# Patient Record
Sex: FEMALE | Race: WHITE | NOT HISPANIC OR LATINO | Employment: FULL TIME | URBAN - METROPOLITAN AREA
[De-identification: names, ages, dates, MRNs, and addresses within clinical notes are randomized per-mention and may not be internally consistent; named-entity substitution may affect disease eponyms.]

---

## 2017-01-06 DIAGNOSIS — E11.9 TYPE 2 DIABETES MELLITUS WITHOUT COMPLICATION, UNSPECIFIED LONG TERM INSULIN USE STATUS: Primary | ICD-10-CM

## 2017-01-13 RX ORDER — BLOOD SUGAR DIAGNOSTIC
STRIP MISCELLANEOUS
Qty: 100 STRIP | Refills: 3 | Status: SHIPPED | OUTPATIENT
Start: 2017-01-13 | End: 2018-01-10

## 2017-02-10 ENCOUNTER — TELEPHONE (OUTPATIENT)
Dept: OBGYN | Facility: CLINIC | Age: 52
End: 2017-02-10

## 2017-02-10 DIAGNOSIS — Z12.39 BREAST CANCER SCREENING, HIGH RISK PATIENT: Primary | ICD-10-CM

## 2017-02-10 NOTE — TELEPHONE ENCOUNTER
Received VM from pt requesting orders for diagnostic mammogram due to breast pain. She was going to have mammo today but needed additional orders. Attempted to reach her to discuss which breast is causing her pain so correct order can be placed- but received VM. Left message to call back to discuss.

## 2017-02-10 NOTE — TELEPHONE ENCOUNTER
Orders placed for a bilateral diagnostic mammogram with ultrasound on right side. Pt is having right breast pain and states she was told by breast center she would need additional orders.

## 2017-02-20 ENCOUNTER — RADIANT APPOINTMENT (OUTPATIENT)
Dept: MAMMOGRAPHY | Facility: CLINIC | Age: 52
End: 2017-02-20
Attending: INTERNAL MEDICINE

## 2017-02-20 DIAGNOSIS — Z12.39 BREAST CANCER SCREENING, HIGH RISK PATIENT: ICD-10-CM

## 2017-02-20 DIAGNOSIS — N64.4 BREAST PAIN, RIGHT: ICD-10-CM

## 2017-03-30 DIAGNOSIS — C49.22: Primary | ICD-10-CM

## 2017-05-24 ENCOUNTER — OFFICE VISIT (OUTPATIENT)
Dept: ORTHOPEDICS | Facility: CLINIC | Age: 52
End: 2017-05-24

## 2017-05-24 VITALS — HEIGHT: 67 IN | BODY MASS INDEX: 41.59 KG/M2 | WEIGHT: 265 LBS

## 2017-05-24 DIAGNOSIS — C49.22: Primary | ICD-10-CM

## 2017-05-24 ASSESSMENT — ENCOUNTER SYMPTOMS
MUSCLE WEAKNESS: 0
NECK PAIN: 0
MYALGIAS: 0
BACK PAIN: 1
STIFFNESS: 0
MUSCLE CRAMPS: 1
ARTHRALGIAS: 0
JOINT SWELLING: 0

## 2017-05-24 NOTE — PROGRESS NOTES
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 There are no recent changes in her medical family or social history. She's not noticed any pain swelling fevers night sweats and nausea vomiting.     On examination she is alert oriented has a normal affect and is in no acute distress. Her heart has a regular rate and rhythm respirations are regular and the ligaments are nonicteric and reactive. In her left leg there is no edema or erythema or adenopathy. I can feel and see her scar but there is no distinct mass in this area. She is numb distal to the mass and that has been unchanged since the time of surgery. She has full motion of her hip knee and ankle. Her gait is normal and symmetric.     CT scan shows no sign of metastatic disease and the MRI is unchanged from 1 performed In December 2015.     Impression is no evidence of tumor. She will follow-up as needed if she finds a mass.

## 2017-05-24 NOTE — NURSING NOTE
"Reason For Visit:   Chief Complaint   Patient presents with     RECHECK     Follow up wide resection of left ankle tumor, DOS: 04/11/2011. Same Day MRI and CT Scan.        Pain Assessment  Patient Currently in Pain: No               HEIGHT: 5' 7\", WEIGHT: 265 lbs 0 oz, BMI: Body mass index is 41.5 kg/(m^2).      Current Outpatient Prescriptions   Medication Sig Dispense Refill     ACCU-CHEK SMARTVIEW test strip TEST FOUR TIMES DAILY AS DIRECTED 100 strip 3     estradiol (VAGIFEM) 10 MCG TABS Place 1 tablet (10 mcg) vaginally twice a week 24 tablet 4     losartan (COZAAR) 50 MG tablet TAKE 1 TABLET BY MOUTH DAILY 90 tablet 3     metFORMIN (GLUCOPHAGE-XR) 500 MG 24 hr tablet Take 2 tablets (1,000 mg) by mouth 2 times daily (with meals) 180 tablet 11     simvastatin (ZOCOR) 20 MG tablet Take 1 tablet (20 mg) by mouth daily 90 tablet 3     blood glucose (DIANELYS CONTOUR NEXT) test strip 1 strip by In Vitro route 2 times daily As directed. 150 strip 5     blood glucose monitoring (ACCU-CHEK COMPACT CARE KIT) meter device kit Test 4 times daily.  May dispense whatever type of brand is covered by patients insurance. 1 kit 0     VITAMIN D, CHOLECALCIFEROL, PO Take 2,000 Units by mouth daily       Blood Glucose Monitoring Suppl (DIANELYS CONTOUR NEXT MONITOR) W/DEVICE KIT As directed       Cetirizine HCl (ZYRTEC PO) Take 1 tablet by mouth as needed.       aspirin 81 MG tablet Take 1 tablet by mouth daily.       Calcium-Vitamin D (CALCIUM + D PO) Take 1 tablet by mouth daily.            Allergies   Allergen Reactions     Latex Itching, Swelling and Rash     "

## 2017-05-24 NOTE — LETTER
5/24/2017       RE: Sola Silverman  [address] [place] [address]     Dear Colleague,    Thank you for referring your patient, Sola Silverman, to the [facility] at [facility]. Please see a copy of my visit note below.    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 There are no recent changes in her medical family or social history. She's not noticed any pain swelling fevers night sweats and nausea vomiting.     On examination she is alert oriented has a normal affect and is in no acute distress. Her heart has a regular rate and rhythm respirations are regular and the ligaments are nonicteric and reactive. In her left leg there is no edema or erythema or adenopathy. I can feel and see her scar but there is no distinct mass in this area. She is numb distal to the mass and that has been unchanged since the time of surgery. She has full motion of her hip knee and ankle. Her gait is normal and symmetric.     CT scan shows no sign of metastatic disease and the MRI is unchanged from 1 performed In December 2015.     Impression is no evidence of tumor. She will follow-up as needed if she finds a mass.    Again, thank you for allowing me to participate in the care of your patient.      Sincerely,    Bhupendra Garcias MD

## 2017-05-24 NOTE — MR AVS SNAPSHOT
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         Additional Information About Your Visit        MyChart Information     [employer] gives you secure access to your electronic health record. If you see a primary care provider, you can also send messages to your care team and make appointments. If you have questions, please call your primary care clinic.  If you do not have a primary care provider, please call [phone] and they will assist you.      [employer] is an electronic gateway that provides easy, online access to your medical records. With [employer], you can request a clinic appointment, read your test results, renew a prescription or communicate with your care team.     To access your existing account, please contact your [facility] or call [phone] for assistance.        Care EveryWhere ID     This is your Care EveryWhere ID. This could be used by other organizations to access your [place] medical records  [id number]        Your Vitals Were     Height BMI (Body Mass Index)                1.702 m (5' 7\") 41.5 kg/m2           Blood Pressure from Last 3 " Encounters:   08/10/16 109/75   07/13/16 131/88   05/10/16 151/90    Weight from Last 3 Encounters:   05/24/17 120.2 kg (265 lb)   08/10/16 122.9 kg (271 lb)   07/13/16 124.7 kg (275 lb)              Today, you had the following     No orders found for display       Primary Care Provider Office Phone # Fax #    Alda Paz Santamaria [phone]       [facility] [address] [facility] [address]        Thank you!     Thank you for choosing [facility]  for your care. Our goal is always to provide you with excellent care. Hearing back from our patients is one way we can continue to improve our services. Please take a few minutes to complete the written survey that you may receive in the mail after your visit with us. Thank you!             Your Updated Medication List - Protect others around you: Learn how to safely use, store and throw away your medicines at www.disposemymeds.org.          This list is accurate as of: 5/24/17  2:06 PM.  Always use your most recent med list.                   Brand Name Dispense Instructions for use    aspirin 81 MG tablet      Take 1 tablet by mouth daily.       * blood glucose monitoring meter device kit      As directed       * blood glucose monitoring meter device kit     1 kit    Test 4 times daily.  May dispense whatever type of brand is covered by patients insurance.       * blood glucose monitoring test strip    DIANELYS CONTOUR NEXT    150 strip    1 strip by In Vitro route 2 times daily As directed.       * ACCU-CHEK SMARTVIEW test strip   Generic drug:  blood glucose monitoring     100 strip    TEST FOUR TIMES DAILY AS DIRECTED       CALCIUM + D PO      Take 1 tablet by mouth daily.       estradiol 10 MCG Tabs vaginal tablet    VAGIFEM    24 tablet    Place 1 tablet (10 mcg) vaginally twice a week       losartan 50 MG tablet    COZAAR    90 tablet    TAKE 1 TABLET BY MOUTH DAILY       metFORMIN 500 MG 24 hr tablet     GLUCOPHAGE-XR    180 tablet    Take 2 tablets (1,000 mg) by mouth 2 times daily (with meals)       simvastatin 20 MG tablet    ZOCOR    90 tablet    Take 1 tablet (20 mg) by mouth daily       VITAMIN D (CHOLECALCIFEROL) PO      Take 2,000 Units by mouth daily       ZYRTEC PO      Take 1 tablet by mouth as needed.       * Notice:  This list has 4 medication(s) that are the same as other medications prescribed for you. Read the directions carefully, and ask your doctor or other care provider to review them with you.

## 2017-07-27 DIAGNOSIS — E11.9 TYPE 2 DIABETES MELLITUS WITHOUT COMPLICATION, UNSPECIFIED LONG TERM INSULIN USE STATUS: ICD-10-CM

## 2017-07-27 DIAGNOSIS — E78.01 HYPERLIPIDEMIA TYPE II: ICD-10-CM

## 2017-07-27 DIAGNOSIS — N95.2 ATROPHIC VAGINITIS: ICD-10-CM

## 2017-07-27 RX ORDER — LOSARTAN POTASSIUM 50 MG/1
50 TABLET ORAL DAILY
Qty: 90 TABLET | Refills: 0 | Status: SHIPPED | OUTPATIENT
Start: 2017-07-27 | End: 2017-09-13

## 2017-07-27 RX ORDER — METFORMIN HCL 500 MG
1000 TABLET, EXTENDED RELEASE 24 HR ORAL 2 TIMES DAILY WITH MEALS
Qty: 360 TABLET | Refills: 0 | Status: SHIPPED | OUTPATIENT
Start: 2017-07-27 | End: 2017-09-13

## 2017-07-27 RX ORDER — ESTRADIOL 10 UG/1
10 INSERT VAGINAL
Qty: 24 TABLET | Refills: 0 | Status: SHIPPED | OUTPATIENT
Start: 2017-07-27 | End: 2017-10-04

## 2017-07-27 RX ORDER — SIMVASTATIN 20 MG
20 TABLET ORAL DAILY
Qty: 90 TABLET | Refills: 0 | Status: SHIPPED | OUTPATIENT
Start: 2017-07-27 | End: 2017-09-13

## 2017-07-27 NOTE — TELEPHONE ENCOUNTER
Received refill request for prescriptions from Dr Beth.  Last in clinic 8/2016.    Spoke to Sola who is working on a saundra with Sept deadline so will schedule her annual with Dr Santamaria for later Sept.  I will send refills to cover until can get seen.Pt indicated understanding and agreed with plan.

## 2017-09-01 DIAGNOSIS — E11.9 TYPE 2 DIABETES MELLITUS WITHOUT COMPLICATION (H): ICD-10-CM

## 2017-09-06 RX ORDER — METFORMIN HCL 500 MG
TABLET, EXTENDED RELEASE 24 HR ORAL
Qty: 360 TABLET | Refills: 0 | OUTPATIENT
Start: 2017-09-06

## 2017-09-13 ENCOUNTER — TELEPHONE (OUTPATIENT)
Dept: OBGYN | Facility: CLINIC | Age: 52
End: 2017-09-13

## 2017-09-13 DIAGNOSIS — E78.01 HYPERLIPIDEMIA TYPE II: ICD-10-CM

## 2017-09-13 DIAGNOSIS — E11.9 TYPE 2 DIABETES MELLITUS WITHOUT COMPLICATION, UNSPECIFIED LONG TERM INSULIN USE STATUS: ICD-10-CM

## 2017-09-13 RX ORDER — SIMVASTATIN 20 MG
20 TABLET ORAL DAILY
Qty: 30 TABLET | Refills: 0 | Status: SHIPPED | OUTPATIENT
Start: 2017-09-13 | End: 2017-10-04

## 2017-09-13 RX ORDER — LOSARTAN POTASSIUM 50 MG/1
50 TABLET ORAL DAILY
Qty: 30 TABLET | Refills: 0 | Status: SHIPPED | OUTPATIENT
Start: 2017-09-13 | End: 2017-10-04

## 2017-09-13 RX ORDER — METFORMIN HCL 500 MG
1000 TABLET, EXTENDED RELEASE 24 HR ORAL 2 TIMES DAILY WITH MEALS
Qty: 120 TABLET | Refills: 0 | Status: SHIPPED | OUTPATIENT
Start: 2017-09-13 | End: 2017-10-04

## 2017-09-13 NOTE — TELEPHONE ENCOUNTER
----- Message from Nikko Montanez sent at 9/13/2017 11:03 AM CDT -----  Regarding: PT of Dr. Santamaria needs another month of RX refills  Contact: [phone]  PT of Dr. Santamaria called to reschedule her appointment from today but needs another month on a few RX refills.  PT would like refills on losartan, metFORMIN and simvastatin and is contacting her pharmacy to send over requests.  She stated she is almost out of metformin.  PT can be reached at [phone].    Thank you,  Nikko    Call Center

## 2017-09-13 NOTE — TELEPHONE ENCOUNTER
Patient needing temporary refills since Dr. Santamaria cancelled clinic today. Rx sent. Patient notified.

## 2017-10-04 ENCOUNTER — OFFICE VISIT (OUTPATIENT)
Dept: INTERNAL MEDICINE | Facility: CLINIC | Age: 52
End: 2017-10-04
Attending: INTERNAL MEDICINE
Payer: COMMERCIAL

## 2017-10-04 VITALS
HEIGHT: 67 IN | HEART RATE: 77 BPM | SYSTOLIC BLOOD PRESSURE: 138 MMHG | BODY MASS INDEX: 41.44 KG/M2 | WEIGHT: 264 LBS | DIASTOLIC BLOOD PRESSURE: 92 MMHG

## 2017-10-04 DIAGNOSIS — E78.01 HYPERLIPIDEMIA TYPE II: ICD-10-CM

## 2017-10-04 DIAGNOSIS — Z23 NEED FOR IMMUNIZATION AGAINST INFLUENZA: ICD-10-CM

## 2017-10-04 DIAGNOSIS — Z00.00 ROUTINE GENERAL MEDICAL EXAMINATION AT A HEALTH CARE FACILITY: Primary | ICD-10-CM

## 2017-10-04 DIAGNOSIS — E11.9 TYPE 2 DIABETES MELLITUS WITHOUT COMPLICATION, UNSPECIFIED LONG TERM INSULIN USE STATUS: ICD-10-CM

## 2017-10-04 DIAGNOSIS — N95.2 ATROPHIC VAGINITIS: ICD-10-CM

## 2017-10-04 PROCEDURE — 25000128 H RX IP 250 OP 636: Mod: ZF

## 2017-10-04 PROCEDURE — G0008 ADMIN INFLUENZA VIRUS VAC: HCPCS

## 2017-10-04 PROCEDURE — 90471 IMMUNIZATION ADMIN: CPT | Mod: ZF

## 2017-10-04 PROCEDURE — 99213 OFFICE O/P EST LOW 20 MIN: CPT | Mod: ZF

## 2017-10-04 PROCEDURE — 90686 IIV4 VACC NO PRSV 0.5 ML IM: CPT | Mod: ZF

## 2017-10-04 RX ORDER — METFORMIN HCL 500 MG
1000 TABLET, EXTENDED RELEASE 24 HR ORAL 2 TIMES DAILY WITH MEALS
Qty: 120 TABLET | Refills: 0 | Status: SHIPPED | OUTPATIENT
Start: 2017-10-04 | End: 2017-11-10

## 2017-10-04 RX ORDER — ESTRADIOL 10 UG/1
10 INSERT VAGINAL
Qty: 24 TABLET | Refills: 0 | Status: SHIPPED | OUTPATIENT
Start: 2017-10-05 | End: 2018-01-10

## 2017-10-04 RX ORDER — SIMVASTATIN 20 MG
20 TABLET ORAL DAILY
Qty: 30 TABLET | Refills: 0 | Status: SHIPPED | OUTPATIENT
Start: 2017-10-04 | End: 2017-12-14

## 2017-10-04 RX ORDER — LOSARTAN POTASSIUM 100 MG/1
100 TABLET ORAL DAILY
Qty: 90 TABLET | Refills: 3 | Status: SHIPPED | OUTPATIENT
Start: 2017-10-04 | End: 2018-03-21

## 2017-10-04 ASSESSMENT — ANXIETY QUESTIONNAIRES
GAD7 TOTAL SCORE: 7
2. NOT BEING ABLE TO STOP OR CONTROL WORRYING: MORE THAN HALF THE DAYS
6. BECOMING EASILY ANNOYED OR IRRITABLE: MORE THAN HALF THE DAYS
7. FEELING AFRAID AS IF SOMETHING AWFUL MIGHT HAPPEN: NOT AT ALL
1. FEELING NERVOUS, ANXIOUS, OR ON EDGE: SEVERAL DAYS
3. WORRYING TOO MUCH ABOUT DIFFERENT THINGS: SEVERAL DAYS
5. BEING SO RESTLESS THAT IT IS HARD TO SIT STILL: NOT AT ALL

## 2017-10-04 ASSESSMENT — PATIENT HEALTH QUESTIONNAIRE - PHQ9
5. POOR APPETITE OR OVEREATING: SEVERAL DAYS
SUM OF ALL RESPONSES TO PHQ QUESTIONS 1-9: 5

## 2017-10-04 NOTE — PROGRESS NOTES
SUBJECTIVE:   CC: Sola Silverman is an 51 year old woman who presents for preventive health visit.     Healthy Habits:    Do you get at least three servings of calcium containing foods daily (dairy, green leafy vegetables, etc.)? yes    Amount of exercise or daily activities, outside of work: not regular    Problems taking medications regularly No    Medication side effects: No    Have you had an eye exam in the past two years? yes    Do you see a dentist twice per year? yes    Do you have sleep apnea, excessive snoring or daytime drowsiness?no          -------------------------------------    Today's PHQ-2 Score: PHQ-2 ( 1999 Pfizer) 7/13/2016   Q1: Little interest or pleasure in doing things Several days   Q2: Feeling down, depressed or hopeless Several days   PHQ-2 Score 2         Abuse: Current or Past(Physical, Sexual or Emotional)- No  Do you feel safe in your environment - Yes  Social History   Substance Use Topics     Smoking status: Never Smoker     Smokeless tobacco: Never Used     Alcohol use Yes      Comment: rare     The patient does not drink >3 drinks per day nor >7 drinks per week.    Reviewed orders with patient.  Reviewed health maintenance and updated orders accordingly - Yes  Labs reviewed in [facility]    Patient over age 50, mutual decision to screen reflected in health maintenance.      Pertinent mammograms are reviewed under the imaging tab.  History of abnormal Pap smear: NO - age 30-65 PAP every 5 years with negative HPV co-testing recommended    Reviewed and updated as needed this visit by [facility]  Meds         Reviewed and updated as needed this visit by Provider        Past Medical History:   Diagnosis Date     Allergy      Ankle joint pain      BRCA2 positive      Depression      Diabetes (H)      Hyperlipidemia      Hypertension      Lumbago      Morbid obesity (H)       Past Surgical History:   Procedure Laterality Date     ABDOMEN SURGERY       EXCISE MASS LOWER  "EXTREMITY  4/11/2011    Procedure:EXCISE MASS LOWER EXTREMITY; Wound Mass; Surgeon:DREW LAURA; Location:[facility]     FOOT SURGERY  2000    left     KNEE SURGERY  1986    left     SALPINGO-OOPHORECTOMY BILATERAL         ROS:  C: NEGATIVE for fever, chills, change in weight  I: NEGATIVE for worrisome rashes, moles or lesions  E: NEGATIVE for vision changes or irritation  ENT: NEGATIVE for ear, mouth and throat problems  R: NEGATIVE for significant cough or SOB  B: NEGATIVE for masses, tenderness or discharge  CV: NEGATIVE for chest pain, palpitations or peripheral edema  GI: NEGATIVE for nausea, abdominal pain, heartburn, or change in bowel habits  : NEGATIVE for unusual urinary or vaginal symptoms. No vaginal bleeding.  M: NEGATIVE for significant arthralgias or myalgia  N: NEGATIVE for weakness, dizziness or paresthesias  P: NEGATIVE for changes in mood or affect     OBJECTIVE:   BP (!) 138/92  Pulse 77  Ht 1.695 m (5' 6.75\")  Wt 119.7 kg (264 lb)  Breastfeeding? No  BMI 41.66 kg/m2  EXAM:  GENERAL: healthy, alert and no distress  EYES: Eyes grossly normal to inspection, PERRL and conjunctivae and sclerae normal  HENT: ear canals and TM's normal, nose and mouth without ulcers or lesions  NECK: no adenopathy, no asymmetry, masses, or scars and thyroid normal to palpation  RESP: lungs clear to auscultation - no rales, rhonchi or wheezes  CV: regular rate and rhythm, normal S1 S2, no S3 or S4, no murmur, click or rub, no peripheral edema and peripheral pulses strong  ABDOMEN: soft, nontender, no hepatosplenomegaly, no masses and bowel sounds normal  MS: no gross musculoskeletal defects noted, no edema  SKIN: no suspicious lesions or rashes  NEURO: Normal strength and tone, mentation intact and speech normal  PSYCH: mentation appears normal, affect normal/bright    ASSESSMENT/PLAN:   1. Routine general medical examination at a health care facility  Patient was advised on vaccinations. Recommend eye " "exam. Discussed thyroid screening. Patient was also advised to check chemistry panel and vitamin D level.   - OPHTHALMOLOGY ADULT REFERRAL  - TSH with free T4 reflex; Future  - 25 OH Vit D therapy monitoring; Future  - Basic Metabolic Panel; Future    2. Hyperlipidemia type II  Patient was advised on lipid management. Recommend to continue with simvastatin.   - losartan (COZAAR) 100 MG tablet; Take 1 tablet (100 mg) by mouth daily  Dispense: 90 tablet; Refill: 3  - simvastatin (ZOCOR) 20 MG tablet; Take 1 tablet (20 mg) by mouth daily  Dispense: 30 tablet; Refill: 0    3. Type 2 diabetes mellitus without complication, unspecified long term insulin use status (H)  Reviewed diabetes management. Will check Hgb A1C. Will screen for microalbuminuria. Patient was also advised on the need for regualr follow up. Refill for metformin was sent to patient's pharmacy.   - metFORMIN (GLUCOPHAGE-XR) 500 MG 24 hr tablet; Take 2 tablets (1,000 mg) by mouth 2 times daily (with meals)  Dispense: 120 tablet; Refill: 0  - Hepatic Panel; Future  - Albumin Random Urine Quantitative with Creat Ratio; Future  - Hemoglobin A1c; Future    4. Atrophic vaginitis  Refill fro topical estradiol was sent to patient's pharmacy.   - estradiol (VAGIFEM) 10 MCG TABS vaginal tablet; Place 1 tablet (10 mcg) vaginally twice a week  Dispense: 24 tablet; Refill: 0    5. Need for immunization against influenza  - HC FLU VAC PRESRV FREE QUAD SPLIT VIR 3+YRS IM    COUNSELING:   Reviewed preventive health counseling, as reflected in patient instructions       Regular exercise       Healthy diet/nutrition       Vision screening         reports that she has never smoked. She has never used smokeless tobacco.    Estimated body mass index is 41.66 kg/(m^2) as calculated from the following:    Height as of this encounter: 1.695 m (5' 6.75\").    Weight as of this encounter: 119.7 kg (264 lb).         Counseling Resources:  ATP IV Guidelines  Pooled Cohorts Equation " Calculator  Breast Cancer Risk Calculator  FRAX Risk Assessment  ICSI Preventive Guidelines  Dietary Guidelines for Americans, 2010  [employer]'s MyPlate  ASA Prophylaxis  Lung CA Screening    Alda Santamaria MD  [facility]

## 2017-10-04 NOTE — LETTER
10/4/2017       RE: Sola Silverman  [address] [place] [address]     Dear Colleague,    Thank you for referring your patient, Sola Silverman, to the [facility]  at [facility]. Please see a copy of my visit note below.       SUBJECTIVE:   CC: Sola Silverman is an 51 year old woman who presents for preventive health visit.     Healthy Habits:    Do you get at least three servings of calcium containing foods daily (dairy, green leafy vegetables, etc.)? yes    Amount of exercise or daily activities, outside of work: not regular    Problems taking medications regularly No    Medication side effects: No    Have you had an eye exam in the past two years? yes    Do you see a dentist twice per year? yes    Do you have sleep apnea, excessive snoring or daytime drowsiness?no          -------------------------------------    Today's PHQ-2 Score: PHQ-2 ( 1999 Pfizer) 7/13/2016   Q1: Little interest or pleasure in doing things Several days   Q2: Feeling down, depressed or hopeless Several days   PHQ-2 Score 2         Abuse: Current or Past(Physical, Sexual or Emotional)- No  Do you feel safe in your environment - Yes  Social History   Substance Use Topics     Smoking status: Never Smoker     Smokeless tobacco: Never Used     Alcohol use Yes      Comment: rare     The patient does not drink >3 drinks per day nor >7 drinks per week.    Reviewed orders with patient.  Reviewed health maintenance and updated orders accordingly - Yes  Labs reviewed in [facility]    Patient over age 50, mutual decision to screen reflected in health maintenance.      Pertinent mammograms are reviewed under the imaging tab.  History of abnormal Pap smear: NO - age 30-65 PAP every 5 years with negative HPV co-testing recommended    Reviewed and updated as needed this visit by [facility]         Reviewed and updated as needed this visit by Provider        Past  "Medical History:   Diagnosis Date     Allergy      Ankle joint pain      BRCA2 positive      Depression      Diabetes (H)      Hyperlipidemia      Hypertension      Lumbago      Morbid obesity (H)       Past Surgical History:   Procedure Laterality Date     ABDOMEN SURGERY       EXCISE MASS LOWER EXTREMITY  4/11/2011    Procedure:EXCISE MASS LOWER EXTREMITY; Wound Mass; Surgeon:DREW LAURA; Location[facility]     FOOT SURGERY  2000    left     KNEE SURGERY  1986    left     SALPINGO-OOPHORECTOMY BILATERAL         ROS:  C: NEGATIVE for fever, chills, change in weight  I: NEGATIVE for worrisome rashes, moles or lesions  E: NEGATIVE for vision changes or irritation  ENT: NEGATIVE for ear, mouth and throat problems  R: NEGATIVE for significant cough or SOB  B: NEGATIVE for masses, tenderness or discharge  CV: NEGATIVE for chest pain, palpitations or peripheral edema  GI: NEGATIVE for nausea, abdominal pain, heartburn, or change in bowel habits  : NEGATIVE for unusual urinary or vaginal symptoms. No vaginal bleeding.  M: NEGATIVE for significant arthralgias or myalgia  N: NEGATIVE for weakness, dizziness or paresthesias  P: NEGATIVE for changes in mood or affect     OBJECTIVE:   BP (!) 138/92  Pulse 77  Ht 1.695 m (5' 6.75\")  Wt 119.7 kg (264 lb)  Breastfeeding? No  BMI 41.66 kg/m2  EXAM:  GENERAL: healthy, alert and no distress  EYES: Eyes grossly normal to inspection, PERRL and conjunctivae and sclerae normal  HENT: ear canals and TM's normal, nose and mouth without ulcers or lesions  NECK: no adenopathy, no asymmetry, masses, or scars and thyroid normal to palpation  RESP: lungs clear to auscultation - no rales, rhonchi or wheezes  CV: regular rate and rhythm, normal S1 S2, no S3 or S4, no murmur, click or rub, no peripheral edema and peripheral pulses strong  ABDOMEN: soft, nontender, no hepatosplenomegaly, no masses and bowel sounds normal  MS: no gross musculoskeletal defects noted, no edema  SKIN: " no suspicious lesions or rashes  NEURO: Normal strength and tone, mentation intact and speech normal  PSYCH: mentation appears normal, affect normal/bright    ASSESSMENT/PLAN:   1. Routine general medical examination at a health care facility  Patient was advised on vaccinations. Recommend eye exam. Discussed thyroid screening. Patient was also advised to check chemistry panel and vitamin D level.   - OPHTHALMOLOGY ADULT REFERRAL  - TSH with free T4 reflex; Future  - 25 OH Vit D therapy monitoring; Future  - Basic Metabolic Panel; Future    2. Hyperlipidemia type II  Patient was advised on lipid management. Recommend to continue with simvastatin.   - losartan (COZAAR) 100 MG tablet; Take 1 tablet (100 mg) by mouth daily  Dispense: 90 tablet; Refill: 3  - simvastatin (ZOCOR) 20 MG tablet; Take 1 tablet (20 mg) by mouth daily  Dispense: 30 tablet; Refill: 0    3. Type 2 diabetes mellitus without complication, unspecified long term insulin use status (H)  Reviewed diabetes management. Will check Hgb A1C. Will screen for microalbuminuria. Patient was also advised on the need for regualr follow up. Refill for metformin was sent to patient's pharmacy.   - metFORMIN (GLUCOPHAGE-XR) 500 MG 24 hr tablet; Take 2 tablets (1,000 mg) by mouth 2 times daily (with meals)  Dispense: 120 tablet; Refill: 0  - Hepatic Panel; Future  - Albumin Random Urine Quantitative with Creat Ratio; Future  - Hemoglobin A1c; Future    4. Atrophic vaginitis  Refill fro topical estradiol was sent to patient's pharmacy.   - estradiol (VAGIFEM) 10 MCG TABS vaginal tablet; Place 1 tablet (10 mcg) vaginally twice a week  Dispense: 24 tablet; Refill: 0    5. Need for immunization against influenza  - HC FLU VAC PRESRV FREE QUAD SPLIT VIR 3+YRS IM    COUNSELING:   Reviewed preventive health counseling, as reflected in patient instructions       Regular exercise       Healthy diet/nutrition       Vision screening         reports that she has never smoked.  "She has never used smokeless tobacco.    Estimated body mass index is 41.66 kg/(m^2) as calculated from the following:    Height as of this encounter: 1.695 m (5' 6.75\").    Weight as of this encounter: 119.7 kg (264 lb).         Counseling Resources:  ATP IV Guidelines  Pooled Cohorts Equation Calculator  Breast Cancer Risk Calculator  FRAX Risk Assessment  ICSI Preventive Guidelines  Dietary Guidelines for Americans, 2010  USDA's MyPlate  ASA Prophylaxis  Lung CA Screening    Alda Santamaria MD  [facility]           "

## 2017-10-04 NOTE — NURSING NOTE
"Injectable Influenza Immunization Documentation    1.  Has the patient received the information for the injectable influenza vaccine? YES     2. Is the patient 6 months of age or older? YES     3. Does the patient have any of the following contraindications?         Severe allergy to eggs? No     Severe allergic reaction to previous influenza vaccines? No   Severe allergy to latex? No       History of Guillain-Mingus syndrome? No     Currently have a temperature greater than 100.4F? No        4.  Severely egg allergic patients should have flu vaccine eligibility assessed by an MD, RN, or pharmacist, and those who received flu vaccine should be observed for 15 min by an MD, RN, Pharmacist, Medical Technician, or member of clinic staff.\": YES    5. Latex-allergic patients should be given latex-free influenza vaccine Yes. Please reference the Vaccine latex table to determine if your clinic s product is latex-containing.       Vaccination given by Gina Parker LPN        "

## 2017-10-04 NOTE — MR AVS SNAPSHOT
After Visit Summary   10/4/2017    Sola Silverman    MRN: [id number]           Patient Information     Date Of Birth          1965        Visit Information        Provider Department      10/4/2017 1:00 PM Alda Santamaria MD [facility]         Today's Diagnoses     Routine general medical examination at a health care facility    -  1    Hyperlipidemia type II        Type 2 diabetes mellitus without complication, unspecified long term insulin use status (H)        Atrophic vaginitis        Need for immunization against influenza          Care Instructions      Preventive Health Recommendations  Female Ages 50 - 64    Yearly exam: See your health care provider every year in order to  o Review health changes.   o Discuss preventive care.    o Review your medicines if your doctor has prescribed any.      Get a Pap test every three years (unless you have an abnormal result and your provider advises testing more often).    If you get Pap tests with HPV test, you only need to test every 5 years, unless you have an abnormal result.     You do not need a Pap test if your uterus was removed (hysterectomy) and you have not had cancer.    You should be tested each year for STDs (sexually transmitted diseases) if you're at risk.     Have a mammogram every 1 to 2 years.    Have a colonoscopy at age 50, or have a yearly FIT test (stool test). These exams screen for colon cancer.      Have a cholesterol test every 5 years, or more often if advised.    Have a diabetes test (fasting glucose) every three years. If you are at risk for diabetes, you should have this test more often.     If you are at risk for osteoporosis (brittle bone disease), think about having a bone density scan (DEXA).    Shots: Get a flu shot each year. Get a tetanus shot every 10 years.    Nutrition:     Eat at least 5 servings of fruits and vegetables each day.    Eat whole-grain bread, whole-wheat pasta and  brown rice instead of white grains and rice.    Talk to your provider about Calcium and Vitamin D.     Lifestyle    Exercise at least 150 minutes a week (30 minutes a day, 5 days a week). This will help you control your weight and prevent disease.    Limit alcohol to one drink per day.    No smoking.     Wear sunscreen to prevent skin cancer.     See your dentist every six months for an exam and cleaning.    See your eye doctor every 1 to 2 years.            Follow-ups after your visit        Additional Services     OPHTHALMOLOGY ADULT REFERRAL       Your provider has referred you to: [facility]: Eye Clinic - [place] [phone]   [email or web address]    Please be aware that coverage of these services is subject to the terms and limitations of your health insurance plan.  Call member services at your health plan with any benefit or coverage questions.      Please bring the following with you to your appointment:    (1) Any X-Rays, CTs or MRIs which have been performed.  Contact the facility where they were done to arrange for  prior to your scheduled appointment.    (2) List of current medications  (3) This referral request   (4) Any documents/labs given to you for this referral                  Follow-up notes from your care team     Return in about 2 weeks (around 10/18/2017).      Your next 10 appointments already scheduled     Oct 18, 2017 11:00 AM CDT   Return Visit with Alda Santamaria MD   [facility]  ([facility])    [address],[facility] [address]  [address]  [address] [address] [phone]              Who to contact     Please call your clinic at [phone] to:    Ask questions about your health    Make or cancel appointments    Discuss your medicines    Learn about your test results    Speak to your doctor   If you have compliments or concerns about an experience at your clinic, or if you wish to  "file a complaint, please contact [facility] Patient Relations at [phone] or email us at [email or web address]         Additional Information About Your Visit        [employer] Information     [employer] gives you secure access to your electronic health record. If you see a primary care provider, you can also send messages to your care team and make appointments. If you have questions, please call your primary care clinic.  If you do not have a primary care provider, please call [phone] and they will assist you.      [employer] is an electronic gateway that provides easy, online access to your medical records. With [employer], you can request a clinic appointment, read your test results, renew a prescription or communicate with your care team.     To access your existing account, please contact your [facility] or call [phone] for assistance.        Care EveryWhere ID     This is your Care EveryWhere ID. This could be used by other organizations to access your [facility] medical records  [id number]        Your Vitals Were     Pulse Height Breastfeeding? BMI (Body Mass Index)          77 1.695 m (5' 6.75\") No 41.66 kg/m2         Blood Pressure from Last 3 Encounters:   10/04/17 (!) 138/92   08/10/16 109/75   07/13/16 131/88    Weight from Last 3 Encounters:   10/04/17 119.7 kg (264 lb)   05/24/17 120.2 kg (265 lb)   08/10/16 122.9 kg (271 lb)              We Performed the Following     HC FLU VAC PRESRV FREE QUAD SPLIT VIR 3+YRS IM     OPHTHALMOLOGY ADULT REFERRAL          Today's Medication Changes          These changes are accurate as of: 10/4/17 11:59 PM.  If you have any questions, ask your nurse or doctor.               These medicines have changed or have updated prescriptions.        Dose/Directions    losartan 100 MG tablet   Commonly known as:  COZAAR   This may have changed:    - medication strength  - how much to take   Used for:  " Hyperlipidemia type II   Changed by:  Alda Santamaria MD        Dose:  100 mg   Take 1 tablet (100 mg) by mouth daily   Quantity:  90 tablet   Refills:  3            Where to get your medicines      These medications were sent to [id number] - [place] - [address] [place] AT [address] [place], [place] [address]     Phone:  [phone]     estradiol 10 MCG Tabs vaginal tablet    losartan 100 MG tablet    metFORMIN 500 MG 24 hr tablet    simvastatin 20 MG tablet                Primary Care Provider Office Phone # Fax #    Alda Santamaria [phone]       [facility] [address]  [employer] [address]        Equal Access to Services     [facility]: Twan de la pazo Sodonnieali, waaxda luqadaha, qaybta kaalmada adeegyada, jessica nino . So [facility] [phone].    ATENCIÓN: Si habla español, tiene a schuster disposición servicios gratuitos de asistencia lingüística. [facility] [phone].    We comply with applicable federal civil rights laws and [place] laws. We do not discriminate on the basis of race, color, national origin, age, disability, sex, sexual orientation, or gender identity.            Thank you!     Thank you for choosing [facility]   for your care. Our goal is always to provide you with excellent care. Hearing back from our patients is one way we can continue to improve our services. Please take a few minutes to complete the written survey that you may receive in the mail after your visit with us. Thank you!             Your Updated Medication List - Protect others around you: Learn how to safely use, store and throw away your medicines at www.disposemymeds.org.          This list is accurate as of: 10/4/17 11:59 PM.  Always use your most recent med list.                   Brand Name Dispense Instructions for use Diagnosis    aspirin 81 MG  tablet      Take 1 tablet by mouth daily.        * blood glucose monitoring meter device kit      As directed    Fall, initial encounter       * blood glucose monitoring meter device kit     1 kit    Test 4 times daily.  May dispense whatever type of brand is covered by patients insurance.    Haemoglobin A1c above reference range       * blood glucose monitoring test strip    DIANELYS CONTOUR NEXT    150 strip    1 strip by In Vitro route 2 times daily As directed.    Fall, initial encounter       * ACCU-CHEK SMARTVIEW test strip   Generic drug:  blood glucose monitoring     100 strip    TEST FOUR TIMES DAILY AS DIRECTED    Type 2 diabetes mellitus without complication, unspecified long term insulin use status (H)       CALCIUM + D PO      Take 1 tablet by mouth daily.        estradiol 10 MCG Tabs vaginal tablet    VAGIFEM    24 tablet    Place 1 tablet (10 mcg) vaginally twice a week    Atrophic vaginitis       losartan 100 MG tablet    COZAAR    90 tablet    Take 1 tablet (100 mg) by mouth daily    Hyperlipidemia type II       metFORMIN 500 MG 24 hr tablet    GLUCOPHAGE-XR    120 tablet    Take 2 tablets (1,000 mg) by mouth 2 times daily (with meals)    Type 2 diabetes mellitus without complication, unspecified long term insulin use status (H)       simvastatin 20 MG tablet    ZOCOR    30 tablet    Take 1 tablet (20 mg) by mouth daily    Hyperlipidemia type II       VITAMIN D (CHOLECALCIFEROL) PO      Take 2,000 Units by mouth daily        ZYRTEC PO      Take 1 tablet by mouth as needed.        * Notice:  This list has 4 medication(s) that are the same as other medications prescribed for you. Read the directions carefully, and ask your doctor or other care provider to review them with you.

## 2017-10-05 ASSESSMENT — ANXIETY QUESTIONNAIRES: GAD7 TOTAL SCORE: 7

## 2017-10-16 DIAGNOSIS — E11.9 TYPE 2 DIABETES MELLITUS WITHOUT COMPLICATION, UNSPECIFIED LONG TERM INSULIN USE STATUS: ICD-10-CM

## 2017-10-16 DIAGNOSIS — Z00.00 ROUTINE GENERAL MEDICAL EXAMINATION AT A HEALTH CARE FACILITY: ICD-10-CM

## 2017-10-16 LAB
ALBUMIN SERPL-MCNC: 3.7 G/DL (ref 3.4–5)
ALP SERPL-CCNC: 72 U/L (ref 40–150)
ALT SERPL W P-5'-P-CCNC: 54 U/L (ref 0–50)
ANION GAP SERPL CALCULATED.3IONS-SCNC: 6 MMOL/L (ref 3–14)
AST SERPL W P-5'-P-CCNC: 30 U/L (ref 0–45)
BILIRUB DIRECT SERPL-MCNC: <0.1 MG/DL (ref 0–0.2)
BILIRUB SERPL-MCNC: 0.4 MG/DL (ref 0.2–1.3)
BUN SERPL-MCNC: 9 MG/DL (ref 7–30)
CALCIUM SERPL-MCNC: 9.1 MG/DL (ref 8.5–10.1)
CHLORIDE SERPL-SCNC: 102 MMOL/L (ref 94–109)
CO2 SERPL-SCNC: 28 MMOL/L (ref 20–32)
CREAT SERPL-MCNC: 0.61 MG/DL (ref 0.52–1.04)
GFR SERPL CREATININE-BSD FRML MDRD: >90 ML/MIN/1.7M2
GLUCOSE SERPL-MCNC: 130 MG/DL (ref 70–99)
HBA1C MFR BLD: 6.3 % (ref 4.3–6)
POTASSIUM SERPL-SCNC: 4.1 MMOL/L (ref 3.4–5.3)
PROT SERPL-MCNC: 7.4 G/DL (ref 6.8–8.8)
SODIUM SERPL-SCNC: 136 MMOL/L (ref 133–144)
TSH SERPL DL<=0.005 MIU/L-ACNC: 4 MU/L (ref 0.4–4)

## 2017-10-18 ENCOUNTER — OFFICE VISIT (OUTPATIENT)
Dept: INTERNAL MEDICINE | Facility: CLINIC | Age: 52
End: 2017-10-18
Attending: INTERNAL MEDICINE
Payer: COMMERCIAL

## 2017-10-18 VITALS
HEART RATE: 83 BPM | DIASTOLIC BLOOD PRESSURE: 88 MMHG | WEIGHT: 262 LBS | SYSTOLIC BLOOD PRESSURE: 131 MMHG | BODY MASS INDEX: 41.34 KG/M2

## 2017-10-18 DIAGNOSIS — E11.9 TYPE 2 DIABETES MELLITUS WITHOUT COMPLICATION, WITHOUT LONG-TERM CURRENT USE OF INSULIN (H): Primary | ICD-10-CM

## 2017-10-18 DIAGNOSIS — E11.9 TYPE 2 DIABETES MELLITUS WITHOUT COMPLICATION, UNSPECIFIED LONG TERM INSULIN USE STATUS: ICD-10-CM

## 2017-10-18 LAB
ALBUMIN UR-MCNC: NEGATIVE MG/DL
APPEARANCE UR: CLEAR
BACTERIA #/AREA URNS HPF: ABNORMAL /HPF
BILIRUB UR QL STRIP: NEGATIVE
COLOR UR AUTO: ABNORMAL
CREAT UR-MCNC: 79 MG/DL
DEPRECATED CALCIDIOL+CALCIFEROL SERPL-MC: <47 UG/L (ref 20–75)
GLUCOSE UR STRIP-MCNC: NEGATIVE MG/DL
HGB UR QL STRIP: NEGATIVE
KETONES UR STRIP-MCNC: NEGATIVE MG/DL
LEUKOCYTE ESTERASE UR QL STRIP: NEGATIVE
MICROALBUMIN UR-MCNC: 16 MG/L
MICROALBUMIN/CREAT UR: 20.38 MG/G CR (ref 0–25)
MUCOUS THREADS #/AREA URNS LPF: PRESENT /LPF
NITRATE UR QL: NEGATIVE
PH UR STRIP: 5.5 PH (ref 5–7)
RBC #/AREA URNS AUTO: 0 /HPF (ref 0–2)
SOURCE: ABNORMAL
SP GR UR STRIP: 1.01 (ref 1–1.03)
SQUAMOUS #/AREA URNS AUTO: <1 /HPF (ref 0–1)
UROBILINOGEN UR STRIP-MCNC: NORMAL MG/DL (ref 0–2)
VITAMIN D2 SERPL-MCNC: <5 UG/L
VITAMIN D3 SERPL-MCNC: 42 UG/L
WBC #/AREA URNS AUTO: 1 /HPF (ref 0–2)

## 2017-10-18 PROCEDURE — 82043 UR ALBUMIN QUANTITATIVE: CPT | Performed by: INTERNAL MEDICINE

## 2017-10-18 PROCEDURE — 99212 OFFICE O/P EST SF 10 MIN: CPT | Mod: ZF

## 2017-10-18 PROCEDURE — 81001 URINALYSIS AUTO W/SCOPE: CPT | Performed by: INTERNAL MEDICINE

## 2017-10-18 NOTE — MR AVS SNAPSHOT
After Visit Summary   10/18/2017    Sola Silverman    MRN: [id number]           Patient Information     Date Of Birth          1965        Visit Information        Provider Department      10/18/2017 11:00 AM Alda Santamaria MD [facility]         Today's Diagnoses     Type 2 diabetes mellitus without complication, without long-term current use of insulin (H)    -  1       Follow-ups after your visit        Who to contact     Please call your clinic at [phone] to:    Ask questions about your health    Make or cancel appointments    Discuss your medicines    Learn about your test results    Speak to your doctor   If you have compliments or concerns about an experience at your clinic, or if you wish to file a complaint, please contact [facility] Patient Relations at [phone] or email us at [email or web address]         Additional Information About Your Visit        MyChart Information     [employer] gives you secure access to your electronic health record. If you see a primary care provider, you can also send messages to your care team and make appointments. If you have questions, please call your primary care clinic.  If you do not have a primary care provider, please call [phone] and they will assist you.      [employer] is an electronic gateway that provides easy, online access to your medical records. With [employer], you can request a clinic appointment, read your test results, renew a prescription or communicate with your care team.     To access your existing account, please contact your [facility] or call [phone] for assistance.        Care EveryWhere ID     This is your Care EveryWhere ID. This could be used by other organizations to access your [facility] medical records  [id number]        Your Vitals Were     Pulse BMI (Body Mass Index)                83 41.34 kg/m2            Blood Pressure from Last 3 Encounters:   10/18/17 131/88   10/04/17 (!) 138/92   08/10/16 109/75    Weight from Last 3 Encounters:   10/18/17 118.8 kg (262 lb)   10/04/17 119.7 kg (264 lb)   05/24/17 120.2 kg (265 lb)              We Performed the Following     Albumin Random Urine Quantitative with Creat Ratio     Routine UA with micro reflex to culture        Primary Care Provider Office Phone # Fax Meri Lizama Paz Santamaria [phone]       [facility] [address]  [facility] [address]        Equal Access to Services     [facility]: Hadii elie Qureshi, waflory verduzco, willam kaalmaanibal little, jessica nino . So [facility] [phone].    ATENCIÓN: Si habla español, tiene a schuster disposición servicios gratuitos de asistencia lingüística. [facility] [phone].    We comply with applicable federal civil rights laws and [place] laws. We do not discriminate on the basis of race, color, national origin, age, disability, sex, sexual orientation, or gender identity.            Thank you!     Thank you for choosing [facility]   for your care. Our goal is always to provide you with excellent care. Hearing back from our patients is one way we can continue to improve our services. Please take a few minutes to complete the written survey that you may receive in the mail after your visit with us. Thank you!             Your Updated Medication List - Protect others around you: Learn how to safely use, store and throw away your medicines at www.disposemymeds.org.          This list is accurate as of: 10/18/17 11:59 PM.  Always use your most recent med list.                   Brand Name Dispense Instructions for use Diagnosis    aspirin 81 MG tablet      Take 1 tablet by mouth daily.        * blood glucose monitoring meter device kit      As directed    Fall, initial encounter       * blood glucose monitoring meter device kit     1  kit    Test 4 times daily.  May dispense whatever type of brand is covered by patients insurance.    Haemoglobin A1c above reference range       * blood glucose monitoring test strip    DIANELYS CONTOUR NEXT    150 strip    1 strip by In Vitro route 2 times daily As directed.    Fall, initial encounter       * ACCU-CHEK SMARTVIEW test strip   Generic drug:  blood glucose monitoring     100 strip    TEST FOUR TIMES DAILY AS DIRECTED    Type 2 diabetes mellitus without complication, unspecified long term insulin use status (H)       CALCIUM + D PO      Take 1 tablet by mouth daily.        estradiol 10 MCG Tabs vaginal tablet    VAGIFEM    24 tablet    Place 1 tablet (10 mcg) vaginally twice a week    Atrophic vaginitis       losartan 100 MG tablet    COZAAR    90 tablet    Take 1 tablet (100 mg) by mouth daily    Hyperlipidemia type II       metFORMIN 500 MG 24 hr tablet    GLUCOPHAGE-XR    120 tablet    Take 2 tablets (1,000 mg) by mouth 2 times daily (with meals)    Type 2 diabetes mellitus without complication, unspecified long term insulin use status (H)       simvastatin 20 MG tablet    ZOCOR    30 tablet    Take 1 tablet (20 mg) by mouth daily    Hyperlipidemia type II       VITAMIN D (CHOLECALCIFEROL) PO      Take 2,000 Units by mouth daily        ZYRTEC PO      Take 1 tablet by mouth as needed.        * Notice:  This list has 4 medication(s) that are the same as other medications prescribed for you. Read the directions carefully, and ask your doctor or other care provider to review them with you.

## 2017-10-18 NOTE — LETTER
10/23/2017         Sola Herrera   [address] [place] [address]        Dear Ms. Silverman:    Your labs were reviewed by Alda Santamaria MD and are within acceptable ranges.  No changes are recommended.     Results for orders placed or performed in visit on 10/18/17   Routine UA with micro reflex to culture   Result Value Ref Range    Color Urine Light Yellow     Appearance Urine Clear     Glucose Urine Negative NEG^Negative mg/dL    Bilirubin Urine Negative NEG^Negative    Ketones Urine Negative NEG^Negative mg/dL    Specific Gravity Urine 1.008 1.003 - 1.035    Blood Urine Negative NEG^Negative    pH Urine 5.5 5.0 - 7.0 pH    Protein Albumin Urine Negative NEG^Negative mg/dL    Urobilinogen mg/dL Normal 0.0 - 2.0 mg/dL    Nitrite Urine Negative NEG^Negative    Leukocyte Esterase Urine Negative NEG^Negative    Source Midstream Urine     WBC Urine 1 0 - 2 /HPF    RBC Urine 0 0 - 2 /HPF    Bacteria Urine Moderate (A) NEG^Negative /HPF    Squamous Epithelial /HPF Urine <1 0 - 1 /HPF    Mucous Urine Present (A) NEG^Negative /LPF   Albumin Random Urine Quantitative with Creat Ratio   Result Value Ref Range    Creatinine Urine 79 mg/dL    Albumin Urine mg/L 16 mg/L    Albumin Urine mg/g Cr 20.38 0 - 25 mg/g Cr         Please note that test explanations are brief and do not reflect all diagnostic uses.  If you have any questions or concerns, please call the clinic at [phone].      Sincerely,      Gina Parker sent on behalf of  Alda Santamaria MD

## 2017-10-18 NOTE — LETTER
10/18/2017       RE: Sola Silverman  [address] [place] [address]     Dear Colleague,    Thank you for referring your patient, Sola Silverman, to the [facility]  at [facility]. Please see a copy of my visit note below.    HPI  Patient is here for follow up on hypertension. She denies new medical problems. She has been able to tolerate the change in medication well.     Review of Systems     Constitutional:  Negative for fever, chills and fatigue.   Eyes:  Negative for decreased vision.   Respiratory:   Negative for cough and dyspnea on exertion.    Cardiovascular:  Negative for chest pain, dyspnea on exertion, claudication and edema.   Gastrointestinal:  Negative for abdominal pain, diarrhea and constipation.   Neurological:  Negative for dizziness.   Psychiatric/Behavioral:  Negative for depression, decreased concentration, mood swings and panic attacks.    Endocrine:  Negative for altered temperature regulation, polyphagia, polydipsia, unwanted hair growth and change in facial hair.    Current Outpatient Prescriptions   Medication     losartan (COZAAR) 100 MG tablet     metFORMIN (GLUCOPHAGE-XR) 500 MG 24 hr tablet     simvastatin (ZOCOR) 20 MG tablet     estradiol (VAGIFEM) 10 MCG TABS vaginal tablet     ACCU-CHEK SMARTVIEW test strip     blood glucose (DIANELYS CONTOUR NEXT) test strip     blood glucose monitoring (ACCU-CHEK COMPACT CARE KIT) meter device kit     VITAMIN D, CHOLECALCIFEROL, PO     Blood Glucose Monitoring Suppl (DIANELYS CONTOUR NEXT MONITOR) W/DEVICE KIT     Cetirizine HCl (ZYRTEC PO)     aspirin 81 MG tablet     Calcium-Vitamin D (CALCIUM + D PO)     No current facility-administered medications for this visit.      Vitals:    10/18/17 1135 10/18/17 1136 10/18/17 1137   BP: 126/90 131/87 131/88   Pulse: 79 79 83   Weight: 118.8 kg (262 lb)         Physical Exam   Constitutional: She is well-developed, well-nourished, and  in no distress.   HENT:   Head: Normocephalic and atraumatic.   Cardiovascular: Normal rate.    Pulmonary/Chest: Effort normal.   Musculoskeletal: She exhibits no edema.   Skin: Skin is warm.   Psychiatric: Mood, memory, affect and judgment normal.   Vitals reviewed.    Assessment and Plan:  Sola was seen today for recheck.    Diagnoses and all orders for this visit:    Type 2 diabetes mellitus without complication, without long-term current use of insulin (H). Blood pressure is at goal. Discussed test results with patient. Recommend to continue with current medical therapy. Patient will follow up in 3 months. Discussed screening for microalbuminuria, tests have been ordered.   -   -     Albumin Random Urine Quantitative with Creat Ratio; Future  -     Routine UA with micro reflex to culture; Future  -     Routine UA with micro reflex to culture  -     Albumin Random Urine Quantitative with Creat Ratio    Total time spent 15 minutes.  More than 50% of the time spent with Ms. Silverman on counseling / coordinating her care      Again, thank you for allowing me to participate in the care of your patient.      Sincerely,    Alda Santamaria MD

## 2017-10-18 NOTE — PROGRESS NOTES
HPI  Patient is here for follow up on hypertension. She denies new medical problems. She has been able to tolerate the change in medication well.     Review of Systems     Constitutional:  Negative for fever, chills and fatigue.   Eyes:  Negative for decreased vision.   Respiratory:   Negative for cough and dyspnea on exertion.    Cardiovascular:  Negative for chest pain, dyspnea on exertion, claudication and edema.   Gastrointestinal:  Negative for abdominal pain, diarrhea and constipation.   Neurological:  Negative for dizziness.   Psychiatric/Behavioral:  Negative for depression, decreased concentration, mood swings and panic attacks.    Endocrine:  Negative for altered temperature regulation, polyphagia, polydipsia, unwanted hair growth and change in facial hair.    Current Outpatient Prescriptions   Medication     losartan (COZAAR) 100 MG tablet     metFORMIN (GLUCOPHAGE-XR) 500 MG 24 hr tablet     simvastatin (ZOCOR) 20 MG tablet     estradiol (VAGIFEM) 10 MCG TABS vaginal tablet     ACCU-CHEK SMARTVIEW test strip     blood glucose (DIANELYS CONTOUR NEXT) test strip     blood glucose monitoring (ACCU-CHEK COMPACT CARE KIT) meter device kit     VITAMIN D, CHOLECALCIFEROL, PO     Blood Glucose Monitoring Suppl (DIANELYS CONTOUR NEXT MONITOR) W/DEVICE KIT     Cetirizine HCl (ZYRTEC PO)     aspirin 81 MG tablet     Calcium-Vitamin D (CALCIUM + D PO)     No current facility-administered medications for this visit.      Vitals:    10/18/17 1135 10/18/17 1136 10/18/17 1137   BP: 126/90 131/87 131/88   Pulse: 79 79 83   Weight: 118.8 kg (262 lb)         Physical Exam   Constitutional: She is well-developed, well-nourished, and in no distress.   HENT:   Head: Normocephalic and atraumatic.   Cardiovascular: Normal rate.    Pulmonary/Chest: Effort normal.   Musculoskeletal: She exhibits no edema.   Skin: Skin is warm.   Psychiatric: Mood, memory, affect and judgment normal.   Vitals reviewed.    Assessment and Plan:  Sola  was seen today for recheck.    Diagnoses and all orders for this visit:    Type 2 diabetes mellitus without complication, without long-term current use of insulin (H). Blood pressure is at goal. Discussed test results with patient. Recommend to continue with current medical therapy. Patient will follow up in 3 months. Discussed screening for microalbuminuria, tests have been ordered.   -   -     Albumin Random Urine Quantitative with Creat Ratio; Future  -     Routine UA with micro reflex to culture; Future  -     Routine UA with micro reflex to culture  -     Albumin Random Urine Quantitative with Creat Ratio    Total time spent 15 minutes.  More than 50% of the time spent with Ms. Silverman on counseling / coordinating her care    Alda Santamaria MD

## 2017-10-18 NOTE — NURSING NOTE
Chief Complaint   Patient presents with     RECHECK     2 weeks follow-up B/P check   Gina Parker LPN

## 2017-10-22 ASSESSMENT — ENCOUNTER SYMPTOMS
PANIC: 0
CONSTIPATION: 0
POLYPHAGIA: 0
DIZZINESS: 0
NERVOUS/ANXIOUS: 0
COUGH: 0
ABDOMINAL PAIN: 0
DIARRHEA: 0
DYSPNEA ON EXERTION: 0
FEVER: 0
POLYDIPSIA: 0
CHILLS: 0
INSOMNIA: 0
DECREASED CONCENTRATION: 0
DEPRESSION: 0
ALTERED TEMPERATURE REGULATION: 0
CLAUDICATION: 0
FATIGUE: 0

## 2017-10-25 DIAGNOSIS — E78.01 HYPERLIPIDEMIA TYPE II: ICD-10-CM

## 2017-10-27 ENCOUNTER — TRANSFERRED RECORDS (OUTPATIENT)
Dept: HEALTH INFORMATION MANAGEMENT | Facility: CLINIC | Age: 52
End: 2017-10-27

## 2017-10-27 RX ORDER — LOSARTAN POTASSIUM 50 MG/1
TABLET ORAL
Qty: 30 TABLET | Refills: 0 | OUTPATIENT
Start: 2017-10-27

## 2017-11-10 DIAGNOSIS — E11.9 TYPE 2 DIABETES MELLITUS WITHOUT COMPLICATION, UNSPECIFIED LONG TERM INSULIN USE STATUS: ICD-10-CM

## 2017-11-15 RX ORDER — METFORMIN HCL 500 MG
TABLET, EXTENDED RELEASE 24 HR ORAL
Qty: 360 TABLET | Refills: 1 | Status: SHIPPED | OUTPATIENT
Start: 2017-11-15 | End: 2018-01-10

## 2017-11-15 NOTE — TELEPHONE ENCOUNTER
Received refill request for metformin.  Last in clinic 10/2017 where this med was to be continued. Refilled for 6 months per protocol.

## 2017-12-05 ENCOUNTER — TELEPHONE (OUTPATIENT)
Dept: OBGYN | Facility: CLINIC | Age: 52
End: 2017-12-05

## 2017-12-05 DIAGNOSIS — Z12.39 BREAST CANCER SCREENING, HIGH RISK PATIENT: Primary | ICD-10-CM

## 2017-12-05 NOTE — TELEPHONE ENCOUNTER
Spoke to Sola to let her know MR of breast ordered so she can call and schedule.Pt indicated understanding and agreed with plan.

## 2017-12-05 NOTE — TELEPHONE ENCOUNTER
----- Message from Angelina Paul sent at 12/4/2017  4:34 PM CST -----  Regarding: MRI for breasts order needed, Hina patient  Contact: [phone]  Patient of Dr. Santamaria. She needs an order for MRI of the breasts.  Please contact her & let her know when the order is on her chart. Okay to leave message on her mobile number. She has been given the number already to Imaging to call once she knows the order is there.   She is going out of town Thursday, so is hoping to hear this week, Tuesday or Wednesday, so she can get it scheduled.       Thank you!  Marla    Call Center       Please DO NOT send this message and/or reply back to sender. Call Center Representatives DO NOT respond to messages.

## 2017-12-14 DIAGNOSIS — E78.01 HYPERLIPIDEMIA TYPE II: ICD-10-CM

## 2017-12-18 RX ORDER — SIMVASTATIN 20 MG
TABLET ORAL
Qty: 90 TABLET | Refills: 3 | Status: SHIPPED | OUTPATIENT
Start: 2017-12-18 | End: 2018-03-21

## 2017-12-21 ENCOUNTER — RADIANT APPOINTMENT (OUTPATIENT)
Dept: MRI IMAGING | Facility: CLINIC | Age: 52
End: 2017-12-21
Attending: INTERNAL MEDICINE
Payer: COMMERCIAL

## 2017-12-21 DIAGNOSIS — Z12.39 BREAST CANCER SCREENING, HIGH RISK PATIENT: ICD-10-CM

## 2017-12-21 RX ORDER — GADOBUTROL 604.72 MG/ML
10 INJECTION INTRAVENOUS ONCE
Status: COMPLETED | OUTPATIENT
Start: 2017-12-21 | End: 2017-12-21

## 2017-12-21 RX ADMIN — GADOBUTROL 10 ML: 604.72 INJECTION INTRAVENOUS at 17:37

## 2017-12-21 NOTE — DISCHARGE INSTRUCTIONS

## 2018-01-10 ENCOUNTER — OFFICE VISIT (OUTPATIENT)
Dept: INTERNAL MEDICINE | Facility: CLINIC | Age: 53
End: 2018-01-10
Attending: INTERNAL MEDICINE
Payer: COMMERCIAL

## 2018-01-10 VITALS
DIASTOLIC BLOOD PRESSURE: 87 MMHG | HEART RATE: 80 BPM | BODY MASS INDEX: 40.87 KG/M2 | WEIGHT: 259 LBS | SYSTOLIC BLOOD PRESSURE: 136 MMHG

## 2018-01-10 DIAGNOSIS — R10.31 ABDOMINAL PAIN, RIGHT LOWER QUADRANT: Primary | ICD-10-CM

## 2018-01-10 DIAGNOSIS — N95.2 ATROPHIC VAGINITIS: ICD-10-CM

## 2018-01-10 DIAGNOSIS — E11.9 TYPE 2 DIABETES MELLITUS WITHOUT COMPLICATION, UNSPECIFIED LONG TERM INSULIN USE STATUS: ICD-10-CM

## 2018-01-10 LAB
ANION GAP SERPL CALCULATED.3IONS-SCNC: 7 MMOL/L (ref 3–14)
BUN SERPL-MCNC: 9 MG/DL (ref 7–30)
CALCIUM SERPL-MCNC: 9.2 MG/DL (ref 8.5–10.1)
CANCER AG125 SERPL-ACNC: <5 U/ML (ref 0–30)
CHLORIDE SERPL-SCNC: 105 MMOL/L (ref 94–109)
CO2 SERPL-SCNC: 30 MMOL/L (ref 20–32)
CREAT SERPL-MCNC: 0.62 MG/DL (ref 0.52–1.04)
GFR SERPL CREATININE-BSD FRML MDRD: >90 ML/MIN/1.7M2
GLUCOSE SERPL-MCNC: 117 MG/DL (ref 70–99)
POTASSIUM SERPL-SCNC: 4.8 MMOL/L (ref 3.4–5.3)
SODIUM SERPL-SCNC: 142 MMOL/L (ref 133–144)

## 2018-01-10 PROCEDURE — 80048 BASIC METABOLIC PNL TOTAL CA: CPT | Performed by: INTERNAL MEDICINE

## 2018-01-10 PROCEDURE — 36415 COLL VENOUS BLD VENIPUNCTURE: CPT | Performed by: INTERNAL MEDICINE

## 2018-01-10 PROCEDURE — 86304 IMMUNOASSAY TUMOR CA 125: CPT | Performed by: INTERNAL MEDICINE

## 2018-01-10 RX ORDER — METFORMIN HCL 500 MG
TABLET, EXTENDED RELEASE 24 HR ORAL
Qty: 360 TABLET | Refills: 1 | Status: SHIPPED | OUTPATIENT
Start: 2018-01-10 | End: 2018-03-21

## 2018-01-10 RX ORDER — ESTRADIOL 10 UG/1
10 INSERT VAGINAL
Qty: 24 TABLET | Refills: 0 | Status: SHIPPED | OUTPATIENT
Start: 2018-01-11 | End: 2018-04-25

## 2018-01-10 ASSESSMENT — PAIN SCALES - GENERAL: PAINLEVEL: MILD PAIN (2)

## 2018-01-10 NOTE — LETTER
1/11/2018         Sola Herrera   [address] [place] [address]        Dear Ms. Silverman:    Your labs were reviewed by Alda Santamaria MD and are within acceptable ranges.  No changes are recommended.     Results for orders placed or performed in visit on 01/10/18   Basic Metabolic Panel   Result Value Ref Range    Sodium 142 133 - 144 mmol/L    Potassium 4.8 3.4 - 5.3 mmol/L    Chloride 105 94 - 109 mmol/L    Carbon Dioxide 30 20 - 32 mmol/L    Anion Gap 7 3 - 14 mmol/L    Glucose 117 (H) 70 - 99 mg/dL    Urea Nitrogen 9 7 - 30 mg/dL    Creatinine 0.62 0.52 - 1.04 mg/dL    GFR Estimate >90 >60 mL/min/1.7m2    GFR Estimate If Black >90 >60 mL/min/1.7m2    Calcium 9.2 8.5 - 10.1 mg/dL      Result Value Ref Range     <5 0 - 30 U/mL         Please note that test explanations are brief and do not reflect all diagnostic uses.  If you have any questions or concerns, please call the clinic at [phone].      Sincerely,      Gina Parker sent on behalf of  Alda Santamaria MD

## 2018-01-10 NOTE — PROGRESS NOTES
HPI  Patient is here for follow up on hypertension and diabetes. She reports that she occasionally has right lower quadrant pain. She states that she has had intermittent pain for the last 6 months. She denies any associated symptoms. She has ongoing issues with diarrhea, but no blood in the stool, fever, or chills.     Review of Systems     Constitutional:  Negative for fever, chills and fatigue.   HENT:  Negative for dry mouth and sinus congestion.    Eyes:  Negative for decreased vision.   Respiratory:   Negative for cough and dyspnea on exertion.    Cardiovascular:  Negative for chest pain, dyspnea on exertion and edema.   Gastrointestinal:  Positive for abdominal pain. Negative for nausea, vomiting, diarrhea and constipation.   Neurological:  Negative for dizziness and loss of consciousness.   Psychiatric/Behavioral:  Negative for depression, decreased concentration, mood swings and panic attacks.    Breast:  Negative for breast discharge, breast mass, breast pain and nipple retraction.   Endocrine:  Negative for altered temperature regulation, polyphagia, polydipsia, unwanted hair growth and change in facial hair.    Current Outpatient Prescriptions   Medication     simvastatin (ZOCOR) 20 MG tablet     metFORMIN (GLUCOPHAGE-XR) 500 MG 24 hr tablet     losartan (COZAAR) 100 MG tablet     estradiol (VAGIFEM) 10 MCG TABS vaginal tablet     ACCU-CHEK SMARTVIEW test strip     blood glucose (DIANELYS CONTOUR NEXT) test strip     blood glucose monitoring (ACCU-CHEK COMPACT CARE KIT) meter device kit     VITAMIN D, CHOLECALCIFEROL, PO     Blood Glucose Monitoring Suppl (DIANELYS CONTOUR NEXT MONITOR) W/DEVICE KIT     Cetirizine HCl (ZYRTEC PO)     aspirin 81 MG tablet     Calcium-Vitamin D (CALCIUM + D PO)     No current facility-administered medications for this visit.      Vitals:    01/10/18 1133 01/10/18 1134 01/10/18 1135   BP: 137/88 135/87 136/87   Pulse: 81 80 80   Weight: 117.5 kg (259 lb)           Physical Exam    Constitutional: She is well-developed, well-nourished, and in no distress.   HENT:   Head: Normocephalic and atraumatic.   Eyes: Pupils are equal, round, and reactive to light.   Neck: Normal range of motion. Neck supple.   Cardiovascular: Normal rate and regular rhythm.    Pulmonary/Chest: Effort normal.   Abdominal: Soft. Bowel sounds are normal. She exhibits no distension and no mass. There is no tenderness. There is no rebound and no guarding.   Musculoskeletal: She exhibits no edema.   Skin: Skin is warm and dry.   Psychiatric: Mood, memory, affect and judgment normal.   Vitals reviewed.    Assessment and Plan:  Sola was seen today for recheck.    Diagnoses and all orders for this visit:    Abdominal pain, right lower quadrant. Discussed possible causes of abdominal pain with patient. Given her BRCA status, recommend CT of the abdomen. Patient will schedule a study at her convenience. She will be advised on test results accordingly.   -     CT Abdomen Pelvis w Contrast; Future  -     Basic Metabolic Panel  -         Atrophic vaginitis. Refill for Vagifem was given to the patient.   -     estradiol (VAGIFEM) 10 MCG TABS vaginal tablet; Place 1 tablet (10 mcg) vaginally twice a week    Type 2 diabetes mellitus without complication, unspecified long term insulin use status (H). Discussed glycemic goals and monitoring frequency. Refills for metformin and glucometer strips were given to the patient. Patient is up to date on vaccines. Recent Hgb A1C is at goal. Will continue with current medical therapy without changes.   -     metFORMIN (GLUCOPHAGE-XR) 500 MG 24 hr tablet; TAKE 2 TABLETS(1000 MG) BY MOUTH TWICE DAILY WITH MEALS  -     blood glucose monitoring (ACCU-CHEK SMARTVIEW) test strip; TEST FOUR TIMES DAILY AS DIRECTED    Total time spent 25 minutes.  More than 50% of the time spent with Ms. Silverman on counseling / coordinating her care    Alda Santamaria MD

## 2018-01-10 NOTE — NURSING NOTE
Chief Complaint   Patient presents with     RECHECK     Follow-up Diabetic check   Gina Parker LPN

## 2018-01-10 NOTE — LETTER
1/10/2018       RE: Sola Silverman  [address] [place] [address]     Dear Colleague,    Thank you for referring your patient, Sola Silverman, to the [facility]  at [facility]. Please see a copy of my visit note below.    HPI  Patient is here for follow up on hypertension and diabetes. She reports that she occasionally has right lower quadrant pain. She states that she has had intermittent pain for the last 6 months. She denies any associated symptoms. She has ongoing issues with diarrhea, but no blood in the stool, fever, or chills.     Review of Systems     Constitutional:  Negative for fever, chills and fatigue.   HENT:  Negative for dry mouth and sinus congestion.    Eyes:  Negative for decreased vision.   Respiratory:   Negative for cough and dyspnea on exertion.    Cardiovascular:  Negative for chest pain, dyspnea on exertion and edema.   Gastrointestinal:  Positive for abdominal pain. Negative for nausea, vomiting, diarrhea and constipation.   Neurological:  Negative for dizziness and loss of consciousness.   Psychiatric/Behavioral:  Negative for depression, decreased concentration, mood swings and panic attacks.    Breast:  Negative for breast discharge, breast mass, breast pain and nipple retraction.   Endocrine:  Negative for altered temperature regulation, polyphagia, polydipsia, unwanted hair growth and change in facial hair.    Current Outpatient Prescriptions   Medication     simvastatin (ZOCOR) 20 MG tablet     metFORMIN (GLUCOPHAGE-XR) 500 MG 24 hr tablet     losartan (COZAAR) 100 MG tablet     estradiol (VAGIFEM) 10 MCG TABS vaginal tablet     ACCU-CHEK SMARTVIEW test strip     blood glucose (DIANELYS CONTOUR NEXT) test strip     blood glucose monitoring (ACCU-CHEK COMPACT CARE KIT) meter device kit     VITAMIN D, CHOLECALCIFEROL, PO     Blood Glucose Monitoring Suppl (DIANELYS CONTOUR NEXT MONITOR) W/DEVICE KIT     Cetirizine  HCl (ZYRTEC PO)     aspirin 81 MG tablet     Calcium-Vitamin D (CALCIUM + D PO)     No current facility-administered medications for this visit.      Vitals:    01/10/18 1133 01/10/18 1134 01/10/18 1135   BP: 137/88 135/87 136/87   Pulse: 81 80 80   Weight: 117.5 kg (259 lb)           Physical Exam   Constitutional: She is well-developed, well-nourished, and in no distress.   HENT:   Head: Normocephalic and atraumatic.   Eyes: Pupils are equal, round, and reactive to light.   Neck: Normal range of motion. Neck supple.   Cardiovascular: Normal rate and regular rhythm.    Pulmonary/Chest: Effort normal.   Abdominal: Soft. Bowel sounds are normal. She exhibits no distension and no mass. There is no tenderness. There is no rebound and no guarding.   Musculoskeletal: She exhibits no edema.   Skin: Skin is warm and dry.   Psychiatric: Mood, memory, affect and judgment normal.   Vitals reviewed.    Assessment and Plan:  Sola was seen today for recheck.    Diagnoses and all orders for this visit:    Abdominal pain, right lower quadrant. Discussed possible causes of abdominal pain with patient. Given her BRCA status, recommend CT of the abdomen. Patient will schedule a study at her convenience. She will be advised on test results accordingly.   -     CT Abdomen Pelvis w Contrast; Future  -     Basic Metabolic Panel  -         Atrophic vaginitis. Refill for Vagifem was given to the patient.   -     estradiol (VAGIFEM) 10 MCG TABS vaginal tablet; Place 1 tablet (10 mcg) vaginally twice a week    Type 2 diabetes mellitus without complication, unspecified long term insulin use status (H). Discussed glycemic goals and monitoring frequency. Refills for metformin and glucometer strips were given to the patient. Patient is up to date on vaccines. Recent Hgb A1C is at goal. Will continue with current medical therapy without changes.   -     metFORMIN (GLUCOPHAGE-XR) 500 MG 24 hr tablet; TAKE 2 TABLETS(1000 MG) BY MOUTH TWICE  DAILY WITH MEALS  -     blood glucose monitoring (ACCU-CHEK SMARTVIEW) test strip; TEST FOUR TIMES DAILY AS DIRECTED    Total time spent 25 minutes.  More than 50% of the time spent with Ms. Silverman on counseling / coordinating her care      Again, thank you for allowing me to participate in the care of your patient.      Sincerely,    Alda Santamaria MD

## 2018-01-10 NOTE — MR AVS SNAPSHOT
After Visit Summary   1/10/2018    Sola Silverman    MRN: [id number]           Patient Information     Date Of Birth          1965        Visit Information        Provider Department      1/10/2018 11:20 AM Alda Santamaria MD [facility]         Today's Diagnoses     Abdominal pain, right lower quadrant    -  1    Atrophic vaginitis        Type 2 diabetes mellitus without complication, unspecified long term insulin use status (H)        Fall, initial encounter           Follow-ups after your visit        Your next 10 appointments already scheduled     Apr 25, 2018 10:00 AM CDT   Return Visit with Alda Santamaria MD   [facility]  ([facility])    [facility]  [address]  [address]  [address] [address] [phone]              Future tests that were ordered for you today     Open Future Orders        Priority Expected Expires Ordered    CT Abdomen Pelvis w Contrast Routine  1/10/2019 1/10/2018            Who to contact     Please call your clinic at [phone] to:    Ask questions about your health    Make or cancel appointments    Discuss your medicines    Learn about your test results    Speak to your doctor   If you have compliments or concerns about an experience at your clinic, or if you wish to file a complaint, please contact [facility] Patient Relations at [phone] or email us at [email or web address]         Additional Information About Your Visit        MyChart Information     [employer] gives you secure access to your electronic health record. If you see a primary care provider, you can also send messages to your care team and make appointments. If you have questions, please call your primary care clinic.  If you do not have a primary care provider, please call [phone] and they will assist you.      [employer] is an electronic gateway that  provides easy, online access to your medical records. With Connectivity, you can request a clinic appointment, read your test results, renew a prescription or communicate with your care team.     To access your existing account, please contact your [facility] or call [phone] for assistance.        Care EveryWhere ID     This is your Care EveryWhere ID. This could be used by other organizations to access your [facility] medical records  [id number]        Your Vitals Were     Pulse Breastfeeding? BMI (Body Mass Index)             80 No 40.87 kg/m2          Blood Pressure from Last 3 Encounters:   01/10/18 136/87   10/18/17 131/88   10/04/17 (!) 138/92    Weight from Last 3 Encounters:   01/10/18 117.5 kg (259 lb)   10/18/17 118.8 kg (262 lb)   10/04/17 119.7 kg (264 lb)              We Performed the Following     Basic Metabolic Panel               Today's Medication Changes          These changes are accurate as of: 1/10/18 11:58 AM.  If you have any questions, ask your nurse or doctor.               These medicines have changed or have updated prescriptions.        Dose/Directions    * blood glucose monitoring test strip   Commonly known as:  ACCU-CHEK SMARTVIEW   This may have changed:  See the new instructions.   Used for:  Type 2 diabetes mellitus without complication, unspecified long term insulin use status (H)   Changed by:  Alda Santamaria MD        TEST FOUR TIMES DAILY AS DIRECTED   Quantity:  100 strip   Refills:  3       * blood glucose monitoring test strip   Commonly known as:  DIANELYS CONTOUR NEXT   This may have changed:  Another medication with the same name was changed. Make sure you understand how and when to take each.   Used for:  Fall, initial encounter   Changed by:  Alda Santamaria MD        Dose:  1 strip   1 strip by In Vitro route 2 times daily As directed.   Quantity:  150 strip   Refills:  5       metFORMIN 500 MG 24 hr tablet   Commonly  known as:  GLUCOPHAGE-XR   This may have changed:  See the new instructions.   Used for:  Type 2 diabetes mellitus without complication, unspecified long term insulin use status (H)   Changed by:  Alda Santamaria MD        TAKE 2 TABLETS(1000 MG) BY MOUTH TWICE DAILY WITH MEALS   Quantity:  360 tablet   Refills:  1       * Notice:  This list has 2 medication(s) that are the same as other medications prescribed for you. Read the directions carefully, and ask your doctor or other care provider to review them with you.         Where to get your medicines      These medications were sent to [id number] - [place] [place] AT [address] [place], [place] [address]     Phone:  [phone]     blood glucose monitoring test strip    blood glucose monitoring test strip    estradiol 10 MCG Tabs vaginal tablet    metFORMIN 500 MG 24 hr tablet                Primary Care Provider Office Phone # Fax #    Alda Santamaria [phone]       [facility] [address] [facility] [address]        Equal Access to Services     : Hadii aad ku hadasho Soomaali, waaxda luqadaha, qaybta kaalmada adeegyada, waxchio nino . So [facility] [phone].    ATENCIÓN: Si habla español, tiene a schuster disposición servicios gratuitos de asistencia lingüística. [facility] [phone].    We comply with applicable federal civil rights laws and [place] laws. We do not discriminate on the basis of race, color, national origin, age, disability, sex, sexual orientation, or gender identity.            Thank you!     Thank you for choosing [facility]   for your care. Our goal is always to provide you with excellent care. Hearing back from our patients is one way we can continue to improve our services. Please take a few minutes to complete the written survey that you may receive  in the mail after your visit with us. Thank you!             Your Updated Medication List - Protect others around you: Learn how to safely use, store and throw away your medicines at www.disposemymeds.org.          This list is accurate as of: 1/10/18 11:58 AM.  Always use your most recent med list.                   Brand Name Dispense Instructions for use Diagnosis    aspirin 81 MG tablet      Take 1 tablet by mouth daily.        * blood glucose monitoring meter device kit      As directed    Fall, initial encounter       * blood glucose monitoring meter device kit     1 kit    Test 4 times daily.  May dispense whatever type of brand is covered by patients insurance.    Haemoglobin A1c above reference range       * blood glucose monitoring test strip    ACCU-CHEK SMARTVIEW    100 strip    TEST FOUR TIMES DAILY AS DIRECTED    Type 2 diabetes mellitus without complication, unspecified long term insulin use status (H)       * blood glucose monitoring test strip    DIANELYS CONTOUR NEXT    150 strip    1 strip by In Vitro route 2 times daily As directed.    Fall, initial encounter       CALCIUM + D PO      Take 1 tablet by mouth daily.        estradiol 10 MCG Tabs vaginal tablet   Start taking on:  1/11/2018    VAGIFEM    24 tablet    Place 1 tablet (10 mcg) vaginally twice a week    Atrophic vaginitis       losartan 100 MG tablet    COZAAR    90 tablet    Take 1 tablet (100 mg) by mouth daily    Hyperlipidemia type II       metFORMIN 500 MG 24 hr tablet    GLUCOPHAGE-XR    360 tablet    TAKE 2 TABLETS(1000 MG) BY MOUTH TWICE DAILY WITH MEALS    Type 2 diabetes mellitus without complication, unspecified long term insulin use status (H)       simvastatin 20 MG tablet    ZOCOR    90 tablet    TAKE 1 TABLET(20 MG) BY MOUTH DAILY    Hyperlipidemia type II       VITAMIN D (CHOLECALCIFEROL) PO      Take 2,000 Units by mouth daily        ZYRTEC PO      Take 1 tablet by mouth as needed.        * Notice:  This list has 4  medication(s) that are the same as other medications prescribed for you. Read the directions carefully, and ask your doctor or other care provider to review them with you.

## 2018-01-14 ASSESSMENT — ENCOUNTER SYMPTOMS
FEVER: 0
DYSPNEA ON EXERTION: 0
SINUS CONGESTION: 0
COUGH: 0
ABDOMINAL PAIN: 1
POLYPHAGIA: 0
ALTERED TEMPERATURE REGULATION: 0
CHILLS: 0
NERVOUS/ANXIOUS: 0
VOMITING: 0
DEPRESSION: 0
BREAST PAIN: 0
POLYDIPSIA: 0
DIZZINESS: 0
BREAST MASS: 0
INSOMNIA: 0
FATIGUE: 0
NAUSEA: 0
LOSS OF CONSCIOUSNESS: 0
DIARRHEA: 0
CONSTIPATION: 0
DECREASED CONCENTRATION: 0
PANIC: 0

## 2018-01-15 ENCOUNTER — RADIANT APPOINTMENT (OUTPATIENT)
Dept: CT IMAGING | Facility: CLINIC | Age: 53
End: 2018-01-15
Attending: INTERNAL MEDICINE
Payer: COMMERCIAL

## 2018-01-15 DIAGNOSIS — R10.31 ABDOMINAL PAIN, RIGHT LOWER QUADRANT: ICD-10-CM

## 2018-01-15 RX ORDER — IOPAMIDOL 755 MG/ML
135 INJECTION, SOLUTION INTRAVASCULAR ONCE
Status: COMPLETED | OUTPATIENT
Start: 2018-01-15 | End: 2018-01-15

## 2018-01-15 RX ADMIN — IOPAMIDOL 135 ML: 755 INJECTION, SOLUTION INTRAVASCULAR at 15:54

## 2018-01-15 NOTE — DISCHARGE INSTRUCTIONS

## 2018-03-21 DIAGNOSIS — E78.01 HYPERLIPIDEMIA TYPE II: ICD-10-CM

## 2018-03-21 DIAGNOSIS — E11.9 TYPE 2 DIABETES MELLITUS WITHOUT COMPLICATION, UNSPECIFIED LONG TERM INSULIN USE STATUS: ICD-10-CM

## 2018-03-21 RX ORDER — SIMVASTATIN 20 MG
TABLET ORAL
Qty: 90 TABLET | Refills: 3 | Status: SHIPPED | OUTPATIENT
Start: 2018-03-21 | End: 2018-07-31

## 2018-03-21 RX ORDER — LOSARTAN POTASSIUM 100 MG/1
100 TABLET ORAL DAILY
Qty: 90 TABLET | Refills: 3 | Status: SHIPPED | OUTPATIENT
Start: 2018-03-21 | End: 2018-07-31

## 2018-03-21 RX ORDER — METFORMIN HCL 500 MG
TABLET, EXTENDED RELEASE 24 HR ORAL
Qty: 360 TABLET | Refills: 1 | Status: SHIPPED | OUTPATIENT
Start: 2018-03-21 | End: 2018-07-31

## 2018-03-21 NOTE — TELEPHONE ENCOUNTER
Received refill request for losartan, simvastatin, and metformin. Patient now gets prescriptions through [facility] and they do not take refills from local pharmacies. Nurse re-sent Rx to [facility] with refills.

## 2018-04-25 ENCOUNTER — OFFICE VISIT (OUTPATIENT)
Dept: INTERNAL MEDICINE | Facility: CLINIC | Age: 53
End: 2018-04-25
Attending: INTERNAL MEDICINE
Payer: COMMERCIAL

## 2018-04-25 VITALS
WEIGHT: 264 LBS | SYSTOLIC BLOOD PRESSURE: 133 MMHG | DIASTOLIC BLOOD PRESSURE: 89 MMHG | BODY MASS INDEX: 41.66 KG/M2 | HEART RATE: 75 BPM

## 2018-04-25 DIAGNOSIS — E11.9 TYPE 2 DIABETES MELLITUS WITHOUT COMPLICATION, WITHOUT LONG-TERM CURRENT USE OF INSULIN (H): Primary | ICD-10-CM

## 2018-04-25 DIAGNOSIS — N95.2 ATROPHIC VAGINITIS: ICD-10-CM

## 2018-04-25 DIAGNOSIS — I10 ESSENTIAL HYPERTENSION, BENIGN: ICD-10-CM

## 2018-04-25 DIAGNOSIS — F43.21 COMPLICATED BEREAVEMENT: ICD-10-CM

## 2018-04-25 LAB
ALBUMIN SERPL-MCNC: 3.7 G/DL (ref 3.4–5)
ALP SERPL-CCNC: 77 U/L (ref 40–150)
ALT SERPL W P-5'-P-CCNC: 39 U/L (ref 0–50)
AST SERPL W P-5'-P-CCNC: 28 U/L (ref 0–45)
BILIRUB DIRECT SERPL-MCNC: <0.1 MG/DL (ref 0–0.2)
BILIRUB SERPL-MCNC: 0.2 MG/DL (ref 0.2–1.3)
CHOLEST SERPL-MCNC: 131 MG/DL
HBA1C MFR BLD: 6.3 % (ref 0–6.4)
HDLC SERPL-MCNC: 54 MG/DL
LDLC SERPL CALC-MCNC: 61 MG/DL
NONHDLC SERPL-MCNC: 77 MG/DL
PROT SERPL-MCNC: 7.9 G/DL (ref 6.8–8.8)
TRIGL SERPL-MCNC: 82 MG/DL

## 2018-04-25 PROCEDURE — 36415 COLL VENOUS BLD VENIPUNCTURE: CPT | Performed by: INTERNAL MEDICINE

## 2018-04-25 PROCEDURE — G0463 HOSPITAL OUTPT CLINIC VISIT: HCPCS | Mod: ZF

## 2018-04-25 PROCEDURE — 83036 HEMOGLOBIN GLYCOSYLATED A1C: CPT | Performed by: INTERNAL MEDICINE

## 2018-04-25 PROCEDURE — 80076 HEPATIC FUNCTION PANEL: CPT | Performed by: INTERNAL MEDICINE

## 2018-04-25 PROCEDURE — 80061 LIPID PANEL: CPT | Performed by: INTERNAL MEDICINE

## 2018-04-25 RX ORDER — ESTRADIOL 10 UG/1
10 INSERT VAGINAL
Qty: 24 TABLET | Refills: 0 | Status: SHIPPED | OUTPATIENT
Start: 2018-04-26 | End: 2018-07-31

## 2018-04-25 ASSESSMENT — ANXIETY QUESTIONNAIRES
5. BEING SO RESTLESS THAT IT IS HARD TO SIT STILL: NOT AT ALL
1. FEELING NERVOUS, ANXIOUS, OR ON EDGE: SEVERAL DAYS
GAD7 TOTAL SCORE: 8
6. BECOMING EASILY ANNOYED OR IRRITABLE: NEARLY EVERY DAY
2. NOT BEING ABLE TO STOP OR CONTROL WORRYING: SEVERAL DAYS
7. FEELING AFRAID AS IF SOMETHING AWFUL MIGHT HAPPEN: SEVERAL DAYS
3. WORRYING TOO MUCH ABOUT DIFFERENT THINGS: SEVERAL DAYS

## 2018-04-25 ASSESSMENT — PAIN SCALES - GENERAL: PAINLEVEL: NO PAIN (0)

## 2018-04-25 ASSESSMENT — PATIENT HEALTH QUESTIONNAIRE - PHQ9: 5. POOR APPETITE OR OVEREATING: SEVERAL DAYS

## 2018-04-25 NOTE — LETTER
4/30/2018         Sola Herrera   [address] [place] [address]        Dear Ms. Silverman:    Your labs were reviewed by Alda Santamaria MD and are within acceptable ranges.  No changes are recommended.     Results for orders placed or performed in visit on 04/25/18   Hemoglobin A1c   Result Value Ref Range    Hemoglobin A1C 6.3 0 - 6.4 %   Lipid Profile   Result Value Ref Range    Cholesterol 131 <200 mg/dL    Triglycerides 82 <150 mg/dL    HDL Cholesterol 54 >49 mg/dL    LDL Cholesterol Calculated 61 <100 mg/dL    Non HDL Cholesterol 77 <130 mg/dL   Hepatic Panel   Result Value Ref Range    Bilirubin Direct <0.1 0.0 - 0.2 mg/dL    Bilirubin Total 0.2 0.2 - 1.3 mg/dL    Albumin 3.7 3.4 - 5.0 g/dL    Protein Total 7.9 6.8 - 8.8 g/dL    Alkaline Phosphatase 77 40 - 150 U/L    ALT 39 0 - 50 U/L    AST 28 0 - 45 U/L         Please note that test explanations are brief and do not reflect all diagnostic uses.  If you have any questions or concerns, please call the clinic at [phone].      Sincerely,      Gina Parker sent on behalf of  Alda Santamaria MD

## 2018-04-25 NOTE — LETTER
4/25/2018       RE: Sola Silverman  [address] [place] [address]     Dear Colleague,    Thank you for referring your patient, Sola Silverman, to the [facility]  at [facility]. Please see a copy of my visit note below.    HPI  Patient is here for follow up on diabetes. She reports that she has been under a lot of stress at work. She states that she has been more irritable. Her mood has been low for the last year.  Patient reports that she has had a lot of doubts about appropriateness of her decisions regarding her father's care at the end of life.  She finds it difficult to discuss the events around the time of his death.  She has not been able to talk to her family about those events. and she finds it very difficult to go to [place].    Review of Systems     Constitutional:  Negative for fever, chills and fatigue.   HENT:  Negative for dry mouth and sinus congestion.    Eyes:  Negative for decreased vision.   Respiratory:   Negative for cough and dyspnea on exertion.    Cardiovascular:  Negative for chest pain, dyspnea on exertion and edema.   Neurological:  Negative for dizziness and headaches.   Psychiatric/Behavioral:  Positive for depression and mood swings.      Current Outpatient Prescriptions   Medication     aspirin 81 MG tablet     blood glucose monitoring (ACCU-CHEK COMPACT CARE KIT) meter device kit     blood glucose monitoring (ACCU-CHEK SMARTVIEW) test strip     blood glucose monitoring (DIANELYS CONTOUR NEXT) test strip     Blood Glucose Monitoring Suppl (DIANELYS CONTOUR NEXT MONITOR) W/DEVICE KIT     Calcium-Vitamin D (CALCIUM + D PO)     Cetirizine HCl (ZYRTEC PO)     estradiol (VAGIFEM) 10 MCG TABS vaginal tablet     losartan (COZAAR) 100 MG tablet     metFORMIN (GLUCOPHAGE-XR) 500 MG 24 hr tablet     simvastatin (ZOCOR) 20 MG tablet     VITAMIN D, CHOLECALCIFEROL, PO     No current facility-administered medications for this  visit.      Vitals:    04/25/18 0959 04/25/18 1001 04/25/18 1002   BP: 138/88 137/87 133/89   Pulse: 81 75 75   Weight: 119.7 kg (264 lb)           Physical Exam   Constitutional: She is well-developed, well-nourished, and in no distress.   HENT:   Head: Normocephalic and atraumatic.   Cardiovascular: Normal rate and regular rhythm.    Pulmonary/Chest: Effort normal.   Musculoskeletal: She exhibits no edema.   Skin: Skin is dry.   Psychiatric: Mood, memory, affect and judgment normal.   Vitals reviewed.      Assessment and Plan:  Sola was seen today for recheck.    Diagnoses and all orders for this visit:    Type 2 diabetes mellitus without complication, without long-term current use of insulin (H).  Patient was encouraged to start regular exercise program.  Discussed glycemic control, recommend checking hemoglobin A1c.  We will also check lipid panel as well as hepatic panel.  Patient will continue with current medical therapy unless glycemic control requires additional interventions.  -     Hemoglobin A1c  -     Lipid Profile  -     Hepatic Panel    Atrophic vaginitis.  patient was given prescription for Vagifem to use for atrophic vaginitis management.  -     estradiol (VAGIFEM) 10 MCG TABS vaginal tablet; Place 1 tablet (10 mcg) vaginally twice a week    Complicated bereavement.  Discussed possible intervention interventions of complicated bereavement.  Patient was advised to consider starting therapy, referral to counseling was provided.  Patient was encouraged to contact her insurance for the list of covered mental health providers.  Will follow up in 1-2 months to review progress.  Will consider medical therapy if patient continues to struggle with ongoing thoughts and depression.  -     MENTAL HEALTH REFERRAL  - Adult; Outpatient Treatment; Individual/Couples/Family/Group Therapy/Health Psychology; [facility]: [facility] [phone]; We will contact you to schedule the appointment or please  call with any questions    Essential hypertension, benign.  Blood pressure is currently within acceptable range, no additional change in therapy is required.  Will continue with close monitoring of the blood pressure.  Patient was encouraged to restart regular physical activity.    Total time spent 25 minutes.  More than 50% of the time spent with Ms. Silverman on counseling / coordinating her care    Alda Santamaria MD

## 2018-04-25 NOTE — MR AVS SNAPSHOT
After Visit Summary   4/25/2018    Sola Silverman    MRN: [id number]           Patient Information     Date Of Birth          1965        Visit Information        Provider Department      4/25/2018 10:00 AM Alda Santamaria MD [facility]         Today's Diagnoses     Type 2 diabetes mellitus without complication, without long-term current use of insulin (H)    -  1    Atrophic vaginitis        Complicated bereavement        Essential hypertension, benign           Follow-ups after your visit        Additional Services     MENTAL HEALTH REFERRAL  - Adult; Outpatient Treatment; Individual/Couples/Family/Group Therapy/Health Psychology; G: [facility] [phone]; We will contact you to schedule the appointment or please call with any questions       All scheduling is subject to the client's specific insurance plan & benefits, provider/location availability, and provider clinical specialities.  Please arrive 15 minutes early for your first appointment and bring your completed paperwork.    Please be aware that coverage of these services is subject to the terms and limitations of your health insurance plan.  Call member services at your health plan with any benefit or coverage questions.                            Who to contact     Please call your clinic at [phone] to:    Ask questions about your health    Make or cancel appointments    Discuss your medicines    Learn about your test results    Speak to your doctor            Additional Information About Your Visit        STYLHUNThart Information     [email or web address] gives you secure access to your electronic health record. If you see a primary care provider, you can also send messages to your care team and make appointments. If you have questions, please call your primary care clinic.  If you do not have a primary care provider, please call [phone] and they will assist you.      TeamBuy is an  electronic gateway that provides easy, online access to your medical records. With myThings, you can request a clinic appointment, read your test results, renew a prescription or communicate with your care team.     To access your existing account, please contact your [facility] or call [phone] for assistance.        Care EveryWhere ID     This is your Care EveryWhere ID. This could be used by other organizations to access your [place] medical records  [id number]        Your Vitals Were     Pulse Breastfeeding? BMI (Body Mass Index)             75 No 41.66 kg/m2          Blood Pressure from Last 3 Encounters:   04/25/18 133/89   01/10/18 136/87   10/18/17 131/88    Weight from Last 3 Encounters:   04/25/18 119.7 kg (264 lb)   01/10/18 117.5 kg (259 lb)   10/18/17 118.8 kg (262 lb)              We Performed the Following     Hemoglobin A1c     Hepatic Panel     Lipid Profile     MENTAL HEALTH REFERRAL  - Adult; Outpatient Treatment; Individual/Couples/Family/Group Therapy/Health Psychology; FMG: [facility] [phone]; We will contact you to schedule the appointment or please call with any questions          Where to get your medicines      These medications were sent to [id number] [place] [address] [facility] [place] [address] [address], [place] [address]     Phone:  [phone]     estradiol 10 MCG Tabs vaginal tablet          Primary Care Provider Office Phone # Fax #    Alda Paz Santamaria [phone]       [facility] [address]  [facility] [address]        Equal Access to Services     [facility] Hospital[facility] RAFAEL : Hadii elie ku hadasho Sojadiel, waaxda luqadaha, qaybta kaalmajessica caban. So [facility] [phone].    ATENCIÓN: Si habla español, tiene a schuster disposición servicios gratuitos de asistencia lingüística. Llame al  [phone].    We comply with applicable federal civil rights laws and [place] laws. We do not discriminate on the basis of race, color, national origin, age, disability, sex, sexual orientation, or gender identity.            Thank you!     Thank you for choosing [facility]   for your care. Our goal is always to provide you with excellent care. Hearing back from our patients is one way we can continue to improve our services. Please take a few minutes to complete the written survey that you may receive in the mail after your visit with us. Thank you!             Your Updated Medication List - Protect others around you: Learn how to safely use, store and throw away your medicines at www.disposemymeds.org.          This list is accurate as of 4/25/18 10:35 AM.  Always use your most recent med list.                   Brand Name Dispense Instructions for use Diagnosis    aspirin 81 MG tablet      Take 1 tablet by mouth daily.        * blood glucose monitoring meter device kit      As directed    Fall, initial encounter       * blood glucose monitoring meter device kit     1 kit    Test 4 times daily.  May dispense whatever type of brand is covered by patients insurance.    Haemoglobin A1c above reference range       * blood glucose monitoring test strip    ACCU-CHEK SMARTVIEW    100 strip    TEST FOUR TIMES DAILY AS DIRECTED    Type 2 diabetes mellitus without complication, unspecified long term insulin use status (H)       * blood glucose monitoring test strip    DIANELYS CONTOUR NEXT    150 strip    1 strip by In Vitro route 2 times daily As directed.        CALCIUM + D PO      Take 1 tablet by mouth daily.        estradiol 10 MCG Tabs vaginal tablet   Start taking on:  4/26/2018    VAGIFEM    24 tablet    Place 1 tablet (10 mcg) vaginally twice a week    Atrophic vaginitis       losartan 100 MG tablet    COZAAR    90 tablet    Take 1 tablet (100 mg) by mouth daily    Hyperlipidemia type II        metFORMIN 500 MG 24 hr tablet    GLUCOPHAGE-XR    360 tablet    TAKE 2 TABLETS(1000 MG) BY MOUTH TWICE DAILY WITH MEALS    Type 2 diabetes mellitus without complication, unspecified long term insulin use status (H)       simvastatin 20 MG tablet    ZOCOR    90 tablet    TAKE 1 TABLET(20 MG) BY MOUTH DAILY    Hyperlipidemia type II       VITAMIN D (CHOLECALCIFEROL) PO      Take 2,000 Units by mouth daily        ZYRTEC PO      Take 1 tablet by mouth as needed.        * Notice:  This list has 4 medication(s) that are the same as other medications prescribed for you. Read the directions carefully, and ask your doctor or other care provider to review them with you.

## 2018-04-25 NOTE — NURSING NOTE
Chief Complaint   Patient presents with     RECHECK     3 month follow-up Diabetic check   Gina Parker LPN

## 2018-04-26 ASSESSMENT — PATIENT HEALTH QUESTIONNAIRE - PHQ9: SUM OF ALL RESPONSES TO PHQ QUESTIONS 1-9: 8

## 2018-04-26 ASSESSMENT — ANXIETY QUESTIONNAIRES: GAD7 TOTAL SCORE: 8

## 2018-04-29 ASSESSMENT — ENCOUNTER SYMPTOMS
DEPRESSION: 1
FATIGUE: 0
DYSPNEA ON EXERTION: 0
HEADACHES: 0
DIZZINESS: 0
CHILLS: 0
FEVER: 0
COUGH: 0
SINUS CONGESTION: 0
NERVOUS/ANXIOUS: 1

## 2018-07-18 ENCOUNTER — OFFICE VISIT (OUTPATIENT)
Dept: PSYCHOLOGY | Facility: CLINIC | Age: 53
End: 2018-07-18
Attending: INTERNAL MEDICINE
Payer: COMMERCIAL

## 2018-07-18 ENCOUNTER — FCC EXTENDED DOCUMENTATION (OUTPATIENT)
Dept: PSYCHOLOGY | Facility: CLINIC | Age: 53
End: 2018-07-18

## 2018-07-18 DIAGNOSIS — F43.21 ADJUSTMENT DISORDER WITH DEPRESSED MOOD: Primary | ICD-10-CM

## 2018-07-18 PROCEDURE — 90832 PSYTX W PT 30 MINUTES: CPT | Performed by: COUNSELOR

## 2018-07-18 NOTE — MR AVS SNAPSHOT
MRN:[id number]                      After Visit Summary   7/18/2018    Sola Silverman    MRN: [id number]           Visit Information        Provider Department      7/18/2018 10:00 AM Mi Curiel [facility]      Your next 10 appointments already scheduled     Jul 24, 2018 11:00 AM CDT   Return Visit with Alda Santamaria MD   [facility]  ([facility])    [facility]  3rd Flr,[facility] [address]  [address]  [facility]  [facility] [address]   [phone]            Jul 25, 2018  9:00 AM CDT   Return Visit with JOE Cali   [facility] ([facility])    [address] [facility] [id number] [phone]            Aug 02, 2018  9:30 AM CDT   Return Visit with JOE Cali   [facility] ([facility])    [address] [facility] [address] [phone]            Aug 08, 2018  9:00 AM CDT   Return Visit with JOE Cali   [facility] ([facility])    [address] [facility] [address] [phone]              MyChart Information     SynapCellt gives you secure access to your electronic health record. If you see a primary care provider, you can also send messages to your care team and make appointments. If you have questions, please call your primary care clinic.  If you do not have a primary care provider, please call [phone] and they will assist you.        Care EveryWhere ID     This is your Care EveryWhere ID. This could be used by other organizations to access your [place] medical records  [id number]        Equal Access to Services     RAFAEL HAYS AH: Twan Qureshi, wajeffda luqadaha, qaybta kaalmada sidney, jessica milan. So [facility] [phone].    ATENCIÓN: Si habla español, tiene a schuster disposición servicios gratuitos  de asistencia lingüística. Jeetarianna al [phone].    We comply with applicable federal civil rights laws and [place] laws. We do not discriminate on the basis of race, color, national origin, age, disability, sex, sexual orientation, or gender identity.

## 2018-07-18 NOTE — PROGRESS NOTES
Progress Note - Initial Session    Client Name:  Sola Silverman Date: 7/18/18         Service Type: Individual      Session Start Time: 10:00 am  Session End Time: 10:48 am      Session Length: 38 - 52      Session #: 1     Attendees: Client attended alone         Diagnostic Assessment in progress.  Unable to complete documentation at the conclusion of the first session due to time dedicated to explaining limits of confidentiality and rapport building. Client requested clarification between psychiatry and counseling, and discussed options for medication referrals in the future if she so chooses. Client notes she is not interesting in medication intervention at this time, and was encouraged to address this with PCP in the future if she wishes to do so. Client reports desire to utilize counseling to assist with complicated grief symptoms related to her father's passing in 2015.      Mental Status Assessment:  Appearance:   Appropriate   Eye Contact:   Good   Psychomotor Behavior: Normal   Attitude:   Cooperative   Orientation:   All  Speech   Rate / Production: Normal    Volume:  Normal   Mood:    Normal  Affect:    Appropriate   Thought Content:  Clear   Thought Form:  Coherent  Logical   Insight:    Good       Safety Issues and Plan for Safety and Risk Management:  Client denies current fears or concerns for personal safety.  Client denies current or recent suicidal ideation or behaviors.  Client denies current or recent homicidal ideation or behaviors.  Client denies current or recent self injurious behavior or ideation.  Client denies other safety concerns.  A safety and risk management plan has not been developed at this time, however client was given the after-hours number / 911 should there be a change in any of these risk factors.  Client reports there are firearms in the house. The firearms are not secured in a locked space. Client was advised to secure all firearms. Ammo is stored in a  separate location.      Diagnostic Criteria:  A. The development of emotional or behavioral symptoms in response to an identifiable stressor(s) occurring within 3 months of the onset of the stressor(s)  B. These symptoms or behaviors are clinically significant, as evidenced by one or both of the following:       - Significant impairment in social, occupational, or other important areas of functioning  C. The stress-related disturbance does not meet criteria for another disorder & is not not an exacerbation of another mental disorder  D. The symptoms do not represent normal bereavement  E. Once the stressor or its consequences have terminated, the symptoms do not persist for more than an additional 6 months       * Adjustment Disorder with Depressed Mood: The predominant manifestations are symptoms such as low mood, tearfulness, or feelings of hopelessness        DSM5 Diagnoses: (Sustained by DSM5 Criteria Listed Above)  Diagnoses: Adjustment Disorders  309.0 (F43.21) With depressed mood  Psychosocial & Contextual Factors: Death of parents  WHODAS 2.0 (12 item)            This questionnaire asks about difficulties due to health conditions. Health conditions  include  disease or illnesses, other health problems that may be short or long lasting,  injuries, mental health or emotional problems, and problems with alcohol or drugs.                     Think back over the past 30 days and answer these questions, thinking about how much  difficulty you had doing the following activities. For each question, please [place] only  one response.    S1 Standing for long periods such as 30 minutes? Mild =           2   S2 Taking care of household responsibilities? Moderate =   3   S3 Learning a new task, for example, learning how to get to a new place? Mild =           2   S4 How much of a problem do you have joining community activities (for example, festivals, Jew or other activities) in the same way as anyone else can?  Mild =           2   S5 How much have you been emotionally affected by your health problems? Moderate =   3     In the past 30 days, how much difficulty did you have in:   S6 Concentrating on doing something for ten minutes? None =         1   S7 Walking a long distance such as a kilometer (or equivalent)? Moderate =   3   S8 Washing your whole body? None =         1   S9 Getting dressed? None =         1   S10 Dealing with people you do not know? Mild =           2   S11 Maintaining a friendship? None =         1   S12 Your day to day work? Mild =           2     H1 Overall, in the past 30 days, how many days were these difficulties present? Record number of days 30   H2 In the past 30 days, for how many days were you totally unable to carry out your usual activities or work because of any health condition? Record number of days  1   H3 In the past 30 days, not counting the days that you were totally unable, for how many days did you cut back or reduce your usual activities or work because of any health condition? Record number of days 10       Collateral Reports Completed:  Routed note to PCP      PLAN: (Homework, other):  Follow-up appointment scheduled to complete Diagnostic Interview.      Mi Curiel, [facility], MA

## 2018-07-18 NOTE — Clinical Note
Sola Osorio Dr. attended her initial counseling appointment yesterday. I will send you the Diagnostic Assessment following it's completion in next session. Let me know if anything additional may be helpful.  Thank you, Mi

## 2018-07-25 ENCOUNTER — OFFICE VISIT (OUTPATIENT)
Dept: PSYCHOLOGY | Facility: CLINIC | Age: 53
End: 2018-07-25
Attending: INTERNAL MEDICINE
Payer: COMMERCIAL

## 2018-07-25 DIAGNOSIS — F43.23 ADJUSTMENT DISORDER WITH MIXED ANXIETY AND DEPRESSED MOOD: Primary | ICD-10-CM

## 2018-07-25 PROCEDURE — 90791 PSYCH DIAGNOSTIC EVALUATION: CPT | Performed by: COUNSELOR

## 2018-07-25 NOTE — MR AVS SNAPSHOT
MRN:[id number]                      After Visit Summary   7/25/2018    Sola Silverman    MRN: [id number]           Visit Information        Provider Department      7/25/2018 9:00 AM Mi Curiel [facility] Generic      Your next 10 appointments already scheduled     Jul 31, 2018 10:00 AM CDT   Return Visit with Alda Santamaria MD   [facility]  ([facility])    [facility]  3rd Flr,[facility] [address]  [address]  [facility]  [facility] [address]   [phone]            Aug 02, 2018  9:30 AM CDT   Return Visit with JOE Cali   [facility] ([facility])    [address] [facility] [address] [phone]            Aug 08, 2018  9:00 AM CDT   Return Visit with JOE Cali   [facility] ([facility])    [address] [facility] [address] [phone]              MyChart Information     Lime[employer]t gives you secure access to your electronic health record. If you see a primary care provider, you can also send messages to your care team and make appointments. If you have questions, please call your primary care clinic.  If you do not have a primary care provider, please call [phone] and they will assist you.        Care EveryWhere ID     This is your Care EveryWhere ID. This could be used by other organizations to access your [place] medical records  [id number]        Equal Access to Services     RAFAEL HAYS AH: Hadii aad ku hadasho Soomaali, waaxda luqadaha, qaybta kaalmada adeegyada, waxchio taryn monaco jolilo milan. So [facility] [phone].    ATENCIÓN: Si habla español, tiene a schuster disposición servicios gratuitos de asistencia lingüística. Llame al [phone].    We comply with applicable federal civil rights laws and [place] laws. We do not discriminate on the basis of race, color, national origin,  age, disability, sex, sexual orientation, or gender identity.

## 2018-07-25 NOTE — Clinical Note
Hi Sola Dumont has completed the Diagnostic Assessment for counseling services, and will begin regular sessions going forward to address her complex grief and increase support network.   Please let me know if any additional information may be helpful.  Thank you! Mi
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
## 2018-07-31 ENCOUNTER — OFFICE VISIT (OUTPATIENT)
Dept: INTERNAL MEDICINE | Facility: CLINIC | Age: 53
End: 2018-07-31
Attending: INTERNAL MEDICINE
Payer: COMMERCIAL

## 2018-07-31 VITALS
HEART RATE: 101 BPM | BODY MASS INDEX: 41.97 KG/M2 | SYSTOLIC BLOOD PRESSURE: 136 MMHG | WEIGHT: 266 LBS | DIASTOLIC BLOOD PRESSURE: 85 MMHG

## 2018-07-31 DIAGNOSIS — N95.2 ATROPHIC VAGINITIS: ICD-10-CM

## 2018-07-31 DIAGNOSIS — F43.21 COMPLICATED BEREAVEMENT: Primary | ICD-10-CM

## 2018-07-31 DIAGNOSIS — E78.01 HYPERLIPIDEMIA TYPE II: ICD-10-CM

## 2018-07-31 DIAGNOSIS — E11.9 TYPE 2 DIABETES MELLITUS WITHOUT COMPLICATION, UNSPECIFIED LONG TERM INSULIN USE STATUS: ICD-10-CM

## 2018-07-31 PROCEDURE — G0463 HOSPITAL OUTPT CLINIC VISIT: HCPCS | Mod: ZF

## 2018-07-31 RX ORDER — SIMVASTATIN 20 MG
TABLET ORAL
Qty: 90 TABLET | Refills: 3 | Status: SHIPPED | OUTPATIENT
Start: 2018-07-31 | End: 2019-09-01

## 2018-07-31 RX ORDER — LOSARTAN POTASSIUM 100 MG/1
100 TABLET ORAL DAILY
Qty: 90 TABLET | Refills: 3 | Status: SHIPPED | OUTPATIENT
Start: 2018-07-31 | End: 2019-09-01

## 2018-07-31 RX ORDER — METFORMIN HCL 500 MG
TABLET, EXTENDED RELEASE 24 HR ORAL
Qty: 360 TABLET | Refills: 3 | Status: SHIPPED | OUTPATIENT
Start: 2018-07-31 | End: 2019-09-01

## 2018-07-31 RX ORDER — ESTRADIOL 10 UG/1
10 INSERT VAGINAL
Qty: 24 TABLET | Refills: 0 | Status: SHIPPED | OUTPATIENT
Start: 2018-08-02 | End: 2018-09-18

## 2018-07-31 ASSESSMENT — ANXIETY QUESTIONNAIRES
3. WORRYING TOO MUCH ABOUT DIFFERENT THINGS: MORE THAN HALF THE DAYS
7. FEELING AFRAID AS IF SOMETHING AWFUL MIGHT HAPPEN: NEARLY EVERY DAY
1. FEELING NERVOUS, ANXIOUS, OR ON EDGE: SEVERAL DAYS
GAD7 TOTAL SCORE: 10
6. BECOMING EASILY ANNOYED OR IRRITABLE: SEVERAL DAYS
2. NOT BEING ABLE TO STOP OR CONTROL WORRYING: MORE THAN HALF THE DAYS
5. BEING SO RESTLESS THAT IT IS HARD TO SIT STILL: NOT AT ALL

## 2018-07-31 ASSESSMENT — ENCOUNTER SYMPTOMS
NERVOUS/ANXIOUS: 0
POLYDIPSIA: 0
DECREASED CONCENTRATION: 0
ABDOMINAL PAIN: 0
INSOMNIA: 0
CHILLS: 0
POLYPHAGIA: 0
COUGH: 0
DYSPNEA ON EXERTION: 0
SWOLLEN GLANDS: 0
BRUISES/BLEEDS EASILY: 0
DEPRESSION: 1
PANIC: 0
FEVER: 0
CONSTIPATION: 0
DIARRHEA: 0
ALTERED TEMPERATURE REGULATION: 0
FATIGUE: 0
SINUS CONGESTION: 0

## 2018-07-31 ASSESSMENT — PATIENT HEALTH QUESTIONNAIRE - PHQ9: 5. POOR APPETITE OR OVEREATING: SEVERAL DAYS

## 2018-07-31 NOTE — MR AVS SNAPSHOT
After Visit Summary   7/31/2018    Sola Silverman    MRN: [id number]           Patient Information     Date Of Birth          1965        Visit Information        Provider Department      7/31/2018 10:00 AM Alda Santamaria MD [facility]         Today's Diagnoses     Type 2 diabetes mellitus without complication, unspecified long term insulin use status (H)        Atrophic vaginitis        Hyperlipidemia type II           Follow-ups after your visit        Follow-up notes from your care team     Return in about 3 months (around 10/31/2018).      Your next 10 appointments already scheduled     Aug 02, 2018  9:30 AM CDT   Return Visit with Mi Curiel [facility] ([facility])    [address] [facility] [address]   [phone]            Aug 08, 2018  9:00 AM CDT   Return Visit with Mi Curiel [facility] ([facility])    [address] [place] [address]   [phone]            Nov 05, 2018 10:00 AM CST   Return Visit with Alda Santamaria MD   [facility]  ([facility])    [address]  [address]  [address] [id number] [phone]              Future tests that were ordered for you today     Open Future Orders        Priority Expected Expires Ordered    Hemoglobin A1c Routine  12/31/2018 7/31/2018    Basic Metabolic Panel Routine  12/31/2018 7/31/2018            Who to contact     Please call your clinic at [phone] to:    Ask questions about your health    Make or cancel appointments    Discuss your medicines    Learn about your test results    Speak to your doctor            Additional Information About Your Visit        MyChart Information     [employer] gives you secure access to your electronic health record. If you see a primary care provider, you can also send messages to  your care team and make appointments. If you have questions, please call your primary care clinic.  If you do not have a primary care provider, please call [phone] and they will assist you.      [employer] is an electronic gateway that provides easy, online access to your medical records. With [employer], you can request a clinic appointment, read your test results, renew a prescription or communicate with your care team.     To access your existing account, please contact your [facility] or call [phone] for assistance.        Care EveryWhere ID     This is your Care EveryWhere ID. This could be used by other organizations to access your [facility] medical records  [id number]        Your Vitals Were     Pulse Breastfeeding? BMI (Body Mass Index)             101 No 41.97 kg/m2          Blood Pressure from Last 3 Encounters:   07/31/18 136/85   04/25/18 133/89   01/10/18 136/87    Weight from Last 3 Encounters:   07/31/18 120.7 kg (266 lb)   04/25/18 119.7 kg (264 lb)   01/10/18 117.5 kg (259 lb)                 Where to get your medicines      These medications were sent to [facility] - [address], [facility] [address]     Phone:  [phone]     blood glucose monitoring test strip    estradiol 10 MCG Tabs vaginal tablet    losartan 100 MG tablet    metFORMIN 500 MG 24 hr tablet    simvastatin 20 MG tablet          Primary Care Provider Office Phone # Fax #    Alda Paz Santamaria [phone]       [facility] [address] [place] [address] [address] [facility] [address]        Equal Access to Services     [facility]: Hadii elie corley hadasho Soomaali, waaxda luqadaha, qaybta kaalmada jessica little . So [facility] [phone].    ATENCIÓN: Si habla español, tiene a schuster disposición servicios gratuitos de asistencia lingüística. Llame al [phone].    We comply with applicable  federal civil rights laws and [place] laws. We do not discriminate on the basis of race, color, national origin, age, disability, sex, sexual orientation, or gender identity.            Thank you!     Thank you for choosing [facility]   for your care. Our goal is always to provide you with excellent care. Hearing back from our patients is one way we can continue to improve our services. Please take a few minutes to complete the written survey that you may receive in the mail after your visit with us. Thank you!             Your Updated Medication List - Protect others around you: Learn how to safely use, store and throw away your medicines at www.disposemymeds.org.          This list is accurate as of 7/31/18 10:27 AM.  Always use your most recent med list.                   Brand Name Dispense Instructions for use Diagnosis    aspirin 81 MG tablet      Take 1 tablet by mouth daily.        * blood glucose monitoring meter device kit      As directed    Fall, initial encounter       * blood glucose monitoring meter device kit     1 kit    Test 4 times daily.  May dispense whatever type of brand is covered by patients insurance.    Haemoglobin A1c above reference range       * blood glucose monitoring test strip    DIANELYS CONTOUR NEXT    150 strip    1 strip by In Vitro route 2 times daily As directed.        * blood glucose monitoring test strip    ACCU-CHEK SMARTVIEW    100 strip    TEST FOUR TIMES DAILY AS DIRECTED    Type 2 diabetes mellitus without complication, unspecified long term insulin use status (H)       CALCIUM + D PO      Take 1 tablet by mouth daily.        estradiol 10 MCG Tabs vaginal tablet   Start taking on:  8/2/2018    VAGIFEM    24 tablet    Place 1 tablet (10 mcg) vaginally twice a week    Atrophic vaginitis       losartan 100 MG tablet    COZAAR    90 tablet    Take 1 tablet (100 mg) by mouth daily    Hyperlipidemia type II       metFORMIN 500 MG 24 hr tablet     GLUCOPHAGE-XR    360 tablet    TAKE 2 TABLETS(1000 MG) BY MOUTH TWICE DAILY WITH MEALS    Type 2 diabetes mellitus without complication, unspecified long term insulin use status (H)       simvastatin 20 MG tablet    ZOCOR    90 tablet    TAKE 1 TABLET(20 MG) BY MOUTH DAILY    Hyperlipidemia type II       VITAMIN D (CHOLECALCIFEROL) PO      Take 2,000 Units by mouth daily        ZYRTEC PO      Take 1 tablet by mouth as needed.        * Notice:  This list has 4 medication(s) that are the same as other medications prescribed for you. Read the directions carefully, and ask your doctor or other care provider to review them with you.

## 2018-07-31 NOTE — PROGRESS NOTES
HPI  Patient is here for follow up. She reports that she was seen by Psychology twice. Patient would prefer to avoid starting medications at this time. She has follow up on Thursday.     Review of Systems     Constitutional:  Negative for fever, chills and fatigue.   HENT:  Negative for dry mouth and sinus congestion.    Respiratory:   Negative for cough and dyspnea on exertion.    Cardiovascular:  Negative for chest pain, dyspnea on exertion and edema.   Gastrointestinal:  Negative for abdominal pain, diarrhea and constipation.   Skin:  Negative for itching and rash.   Endo/Heme:  Negative for anemia, swollen glands and bruises/bleeds easily.   Psychiatric/Behavioral:  Positive for depression and mood swings. Negative for decreased concentration and panic attacks.    Endocrine:  Negative for altered temperature regulation, polyphagia, polydipsia, unwanted hair growth and change in facial hair.    Current Outpatient Prescriptions   Medication     aspirin 81 MG tablet     blood glucose monitoring (ACCU-CHEK COMPACT CARE KIT) meter device kit     blood glucose monitoring (ACCU-CHEK SMARTVIEW) test strip     blood glucose monitoring (DIANELYS CONTOUR NEXT) test strip     Blood Glucose Monitoring Suppl ([employer] CONTOUR NEXT MONITOR) W/DEVICE KIT     Calcium-Vitamin D (CALCIUM + D PO)     Cetirizine HCl (ZYRTEC PO)     estradiol (VAGIFEM) 10 MCG TABS vaginal tablet     losartan (COZAAR) 100 MG tablet     metFORMIN (GLUCOPHAGE-XR) 500 MG 24 hr tablet     simvastatin (ZOCOR) 20 MG tablet     VITAMIN D, CHOLECALCIFEROL, PO     No current facility-administered medications for this visit.      Vitals:    07/31/18 1001 07/31/18 1002 07/31/18 1003   BP: 138/89 121/86 136/85   Pulse: 72 83 101   Weight: 120.7 kg (266 lb)         Physical Exam   Constitutional: She is well-developed, well-nourished, and in no distress.   HENT:   Head: Normocephalic and atraumatic.   Eyes: Conjunctivae are normal.   Cardiovascular: Normal rate.     Pulmonary/Chest: Effort normal.   Musculoskeletal: She exhibits no edema.   Skin: Skin is warm and dry.   Psychiatric: Mood, memory, affect and judgment normal.   Vitals reviewed.      Assessment and Plan:  Sola was seen today for recheck.    Diagnoses and all orders for this visit:    Complicated bereavement.  Discussed counseling versus medication with patient for management of complicated bereavement.  Patient reports that she would prefer to address her symptoms with counseling first.  The role of medication was discussed with the patient, she may contact the clinic at any time to discuss prescription for antidepressant.  Patient expressed understanding and agreement with the plan.    Type 2 diabetes mellitus without complication, unspecified long term insulin use status (H).  Most recent hemoglobin A1c is at goal.  Recommended to continue with current medical therapy without changes.  Will check hemoglobin A1c as well as basic chemistry panel in 3 months at the time of the next follow-up.  -     blood glucose monitoring (ACCU-CHEK SMARTVIEW) test strip; TEST FOUR TIMES DAILY AS DIRECTED  -     metFORMIN (GLUCOPHAGE-XR) 500 MG 24 hr tablet; TAKE 2 TABLETS(1000 MG) BY MOUTH TWICE DAILY WITH MEALS  -     Hemoglobin A1c; Future  -     Basic Metabolic Panel; Future    Atrophic vaginitis.  Refill for estradiol tablet was given to the patient today.  -     estradiol (VAGIFEM) 10 MCG TABS vaginal tablet; Place 1 tablet (10 mcg) vaginally twice a week    Hyperlipidemia type II.  Blood pressure currently is within acceptable range we will continue with current antihypertensive therapy.  Patient will also continue with simvastatin at current dose.  May consider increasing to full therapeutic dose of 40 mg at the time of next appointment.  -     losartan (COZAAR) 100 MG tablet; Take 1 tablet (100 mg) by mouth daily  -     simvastatin (ZOCOR) 20 MG tablet; TAKE 1 TABLET(20 MG) BY MOUTH DAILY    Total time spent 25  minutes.  More than 50% of the time spent with Ms. Silverman on counseling / coordinating her care    Alda Santamaria MD

## 2018-07-31 NOTE — LETTER
7/31/2018       RE: Sola Silverman  [address] [place] [address]     Dear Colleague,    Thank you for referring your patient, Sola Silverman, to the [facility]  at [facility]. Please see a copy of my visit note below.    HPI  Patient is here for follow up. She reports that she was seen by Psychology twice. Patient would prefer to avoid starting medications at this time. She has follow up on Thursday.     Review of Systems     Constitutional:  Negative for fever, chills and fatigue.   HENT:  Negative for dry mouth and sinus congestion.    Respiratory:   Negative for cough and dyspnea on exertion.    Cardiovascular:  Negative for chest pain, dyspnea on exertion and edema.   Gastrointestinal:  Negative for abdominal pain, diarrhea and constipation.   Skin:  Negative for itching and rash.   Endo/Heme:  Negative for anemia, swollen glands and bruises/bleeds easily.   Psychiatric/Behavioral:  Positive for depression and mood swings. Negative for decreased concentration and panic attacks.    Endocrine:  Negative for altered temperature regulation, polyphagia, polydipsia, unwanted hair growth and change in facial hair.    Current Outpatient Prescriptions   Medication     aspirin 81 MG tablet     blood glucose monitoring (ACCU-CHEK COMPACT CARE KIT) meter device kit     blood glucose monitoring (ACCU-CHEK SMARTVIEW) test strip     blood glucose monitoring (DIANELYS CONTOUR NEXT) test strip     Blood Glucose Monitoring Suppl (DIANELYS CONTOUR NEXT MONITOR) W/DEVICE KIT     Calcium-Vitamin D (CALCIUM + D PO)     Cetirizine HCl (ZYRTEC PO)     estradiol (VAGIFEM) 10 MCG TABS vaginal tablet     losartan (COZAAR) 100 MG tablet     metFORMIN (GLUCOPHAGE-XR) 500 MG 24 hr tablet     simvastatin (ZOCOR) 20 MG tablet     VITAMIN D, CHOLECALCIFEROL, PO     No current facility-administered medications for this visit.      Vitals:    07/31/18 1001 07/31/18 1002  07/31/18 1003   BP: 138/89 121/86 136/85   Pulse: 72 83 101   Weight: 120.7 kg (266 lb)         Physical Exam   Constitutional: She is well-developed, well-nourished, and in no distress.   HENT:   Head: Normocephalic and atraumatic.   Eyes: Conjunctivae are normal.   Cardiovascular: Normal rate.    Pulmonary/Chest: Effort normal.   Musculoskeletal: She exhibits no edema.   Skin: Skin is warm and dry.   Psychiatric: Mood, memory, affect and judgment normal.   Vitals reviewed.      Assessment and Plan:  Sola was seen today for recheck.    Diagnoses and all orders for this visit:    Complicated bereavement.  Discussed counseling versus medication with patient for management of complicated bereavement.  Patient reports that she would prefer to address her symptoms with counseling first.  The role of medication was discussed with the patient, she may contact the clinic at any time to discuss prescription for antidepressant.  Patient expressed understanding and agreement with the plan.    Type 2 diabetes mellitus without complication, unspecified long term insulin use status (H).  Most recent hemoglobin A1c is at goal.  Recommended to continue with current medical therapy without changes.  Will check hemoglobin A1c as well as basic chemistry panel in 3 months at the time of the next follow-up.  -     blood glucose monitoring (ACCU-CHEK SMARTVIEW) test strip; TEST FOUR TIMES DAILY AS DIRECTED  -     metFORMIN (GLUCOPHAGE-XR) 500 MG 24 hr tablet; TAKE 2 TABLETS(1000 MG) BY MOUTH TWICE DAILY WITH MEALS  -     Hemoglobin A1c; Future  -     Basic Metabolic Panel; Future    Atrophic vaginitis.  Refill for estradiol tablet was given to the patient today.  -     estradiol (VAGIFEM) 10 MCG TABS vaginal tablet; Place 1 tablet (10 mcg) vaginally twice a week    Hyperlipidemia type II.  Blood pressure currently is within acceptable range we will continue with current antihypertensive therapy.  Patient will also continue with  simvastatin at current dose.  May consider increasing to full therapeutic dose of 40 mg at the time of next appointment.  -     losartan (COZAAR) 100 MG tablet; Take 1 tablet (100 mg) by mouth daily  -     simvastatin (ZOCOR) 20 MG tablet; TAKE 1 TABLET(20 MG) BY MOUTH DAILY    Total time spent 25 minutes.  More than 50% of the time spent with Ms. Silverman on counseling / coordinating her care    Alda Santamaria MD              Again, thank you for allowing me to participate in the care of your patient.      Sincerely,    Alda Santaamria MD

## 2018-08-01 ASSESSMENT — ANXIETY QUESTIONNAIRES: GAD7 TOTAL SCORE: 10

## 2018-08-01 ASSESSMENT — PATIENT HEALTH QUESTIONNAIRE - PHQ9: SUM OF ALL RESPONSES TO PHQ QUESTIONS 1-9: 14

## 2018-08-02 ENCOUNTER — OFFICE VISIT (OUTPATIENT)
Dept: PSYCHOLOGY | Facility: CLINIC | Age: 53
End: 2018-08-02
Payer: COMMERCIAL

## 2018-08-02 DIAGNOSIS — F43.23 ADJUSTMENT DISORDER WITH MIXED ANXIETY AND DEPRESSED MOOD: Primary | ICD-10-CM

## 2018-08-02 PROCEDURE — 90834 PSYTX W PT 45 MINUTES: CPT | Performed by: COUNSELOR

## 2018-08-02 ASSESSMENT — ANXIETY QUESTIONNAIRES
2. NOT BEING ABLE TO STOP OR CONTROL WORRYING: NEARLY EVERY DAY
5. BEING SO RESTLESS THAT IT IS HARD TO SIT STILL: NOT AT ALL
6. BECOMING EASILY ANNOYED OR IRRITABLE: MORE THAN HALF THE DAYS
IF YOU CHECKED OFF ANY PROBLEMS ON THIS QUESTIONNAIRE, HOW DIFFICULT HAVE THESE PROBLEMS MADE IT FOR YOU TO DO YOUR WORK, TAKE CARE OF THINGS AT HOME, OR GET ALONG WITH OTHER PEOPLE: SOMEWHAT DIFFICULT
1. FEELING NERVOUS, ANXIOUS, OR ON EDGE: MORE THAN HALF THE DAYS
3. WORRYING TOO MUCH ABOUT DIFFERENT THINGS: MORE THAN HALF THE DAYS
7. FEELING AFRAID AS IF SOMETHING AWFUL MIGHT HAPPEN: MORE THAN HALF THE DAYS
GAD7 TOTAL SCORE: 12

## 2018-08-02 ASSESSMENT — PATIENT HEALTH QUESTIONNAIRE - PHQ9: 5. POOR APPETITE OR OVEREATING: SEVERAL DAYS

## 2018-08-02 NOTE — MR AVS SNAPSHOT
MRN:[id number]                      After Visit Summary   8/2/2018    Sola Silverman    MRN: [id number]           Visit Information        Provider Department      8/2/2018 9:30 AM Mi Curiel [facility] Generic      Your next 10 appointments already scheduled     Aug 08, 2018  9:00 AM CDT   Return Visit with Mi Curiel [facility] ([facility])    [address] [address]   [phone]            Aug 21, 2018  8:00 AM CDT   Return Visit with Mi Curiel [facility] ([facility])    [address] [address]   [phone]            Aug 29, 2018  9:00 AM CDT   Return Visit with Mi Curiel [facility] ([facility])    [address] [address] [phone]            Nov 05, 2018 10:00 AM CST   Return Visit with Alda Santamaria MD   [facility]  ([facility])    [address]  [address]  [address] [address] [phone]              MyChart Information     "[employer] gives you secure access to your electronic health record. If you see a primary care provider, you can also send messages to your care team and make appointments. If you have questions, please call your primary care clinic.  If you do not have a primary care provider, please call [phone] and they will assist you.        Care EveryWhere ID     This is your Care EveryWhere ID. This could be used by other organizations to access your [facility] medical records  [id number]        Equal Access to Services     RAFAEL HAYS AH: Hadii elie Qureshi, waaxda luqadaha, qaybta kaalmada adeegyada, jessica Benítez [facility] [phone].    ATENCIÓN: Si habla español, tiene a schuster disposición servicios gratuitos de  asistencia lingüística. Rosanna al [phone].    We comply with applicable federal civil rights laws and [place] laws. We do not discriminate on the basis of race, color, national origin, age, disability, sex, sexual orientation, or gender identity.

## 2018-08-02 NOTE — PROGRESS NOTES
Progress Note    Client Name: Sola Silverman  Date: 8/2/18         Service Type: Individual      Session Start Time: 9:30 am  Session End Time: 10:15 am      Session Length: 45 min     Session #: 3     Attendees: Client attended alone    Treatment Plan Last Reviewed: 8/2/18 (Review by 11/2/18)  PHQ-9 / HERMINIO-7 : 13 / 12     DATA      Progress Since Last Session (Related to Symptoms / Goals / Homework):   Symptoms: No change      Homework: n/a      Episode of Care Goals: Treatment goals developed in today's session     Current / Ongoing Stressors and Concerns:   Ongoing: Work stress, grief around loss of father and disconnection from siblings, limited support system     Treatment Objective(s) Addressed in This Session:   Treatment goals developed in today's session     Intervention:   Solution Focused: Assisted with goal development and explained process of solution-focused work        ASSESSMENT: Current Emotional / Mental Status (status of significant symptoms):   Risk status (Self / Other harm or suicidal ideation)   Client denies current fears or concerns for personal safety.   Client denies current or recent suicidal ideation or behaviors.   Client denies current or recent homicidal ideation or behaviors.   Client denies current or recent self injurious behavior or ideation.   Client denies other safety concerns.   Client Client reports there has been no change in risk factors since their last session.     Client Client reports there has been no change in protective factors since their last session.     A safety and risk management plan has not been developed at this time, however client was given the after-hours number / 911 should there be a change in any of these risk factors.     Appearance:   Appropriate    Eye Contact:   Good    Psychomotor Behavior: Normal    Attitude:   Cooperative    Orientation:   All   Speech    Rate / Production: Normal      Volume:  Normal    Mood:    Normal   Affect:    Appropriate    Thought Content:  Clear    Thought Form:  Coherent  Logical    Insight:    Good      Medication Review:   No current psychiatric medications prescribed     Medication Compliance:   NA     Changes in Health Issues:   None reported     Chemical Use Review:   Substance Use: Chemical use reviewed, no active concerns identified      Tobacco Use: No current tobacco use.       Collateral Reports Completed:   Not Applicable    PLAN: (Client Tasks / Therapist Tasks / Other)  Follow-up appointment scheduled.        JOE Cali, MA                                                         ________________________________________________________________________    Treatment Plan    Client's Name: Sola Silverman  YOB: 1965    Date: 8/2/18    Diagnoses:            Adjustment Disorders  309.28 (F43.23) With mixed anxiety and depressed mood  Psychosocial & Contextual Factors: Death of father, estrangement from sister, limited support network  WHODAS: 23    Referral / Collaboration:  Referral to another professional/service is not indicated at this time..    Anticipated number of session or this episode of care: 30      MeasurableTreatment Goal(s) related to diagnosis / functional impairment(s)  Goal 1: Client will increase understanding and engagement in the grief process.    Objective #A (Client Action)    Client will increase understanding of steps in the grief process.  Status: New - Date: 8/2/18     Intervention(s)  Therapist will assign homework    provide educational materials on grief process.    Objective #B  Client will develop vocabulary to describe feelings of grief and loss.  Status: New - Date: 8/2/18     Intervention(s)  Therapist will assign homework    teach emotional recognition/identification.  .    Objective #C  Client will identify steps toward managing grief.  Status: New - Date: 8/2/18     Intervention(s)  Therapist  will assign homework    teach Mindfulness, Distress Tolerance, and Emotion Regulation skills.      Goal 2: Client will decrease anxiety symptoms as evidence by HERMINIO-7 scores.    Objective #A (Client Action)    Status: New - Date: 8/2/18     Client will engage in a physical activity 3 times weekly for 15 minutes.    Intervention(s)  Therapist will assign homework    teach emotional regulation skills.  .    Objective #B  Client will use at least 3 coping skills for anxiety management in the next 3 weeks.    Status: New - Date: 8/2/18     Intervention(s)  Therapist will assign homework    teach Distress Tolerance techniques.    Objective #C  Client will identify 3 initial signs or symptoms of anxiety.  Status: New - Date: 8/2/18     Intervention(s)  Therapist will assign homework    teach emotional recognition/identification.   .        Client has reviewed and agreed to the above plan.      Mi Curiel [facility], MA August 2, 2018

## 2018-08-03 ASSESSMENT — ANXIETY QUESTIONNAIRES: GAD7 TOTAL SCORE: 12

## 2018-08-03 ASSESSMENT — PATIENT HEALTH QUESTIONNAIRE - PHQ9: SUM OF ALL RESPONSES TO PHQ QUESTIONS 1-9: 13

## 2018-08-08 ENCOUNTER — OFFICE VISIT (OUTPATIENT)
Dept: PSYCHOLOGY | Facility: CLINIC | Age: 53
End: 2018-08-08
Payer: COMMERCIAL

## 2018-08-08 DIAGNOSIS — F43.23 ADJUSTMENT DISORDER WITH MIXED ANXIETY AND DEPRESSED MOOD: Primary | ICD-10-CM

## 2018-08-08 PROCEDURE — 90834 PSYTX W PT 45 MINUTES: CPT | Performed by: COUNSELOR

## 2018-08-08 NOTE — PROGRESS NOTES
Progress Note    Client Name: Sola Silverman  Date: 8/8/18         Service Type: Individual      Session Start Time: 9:00 am  Session End Time: 9:53 am      Session Length: 53 min     Session #: 4     Attendees: Client attended alone    Treatment Plan Last Reviewed: 8/2/18 (Review by 11/2/18)  PHQ-9 / HERMINIO-7 : ---     DATA      Progress Since Last Session (Related to Symptoms / Goals / Homework):   Symptoms: No change      Homework: n/a      Episode of Care Goals: Minimal progress - PREPARATION (Decided to change - considering how); Intervened by negotiating a change plan and determining options / strategies for behavior change, identifying triggers, exploring social supports, and working towards setting a date to begin behavior change     Current / Ongoing Stressors and Concerns:   Ongoing: Work stress, grief around loss of father and disconnection from siblings, limited support system   Current: Work stressors- client reports feeling overwhelmed and struggles to complete tasks due to others' seeking out her support for their work.     Treatment Objective(s) Addressed in This Session:   use at least 3 coping skills for anxiety management in the next 3 weeks.     Intervention:   DBT: Discussed importance of identifying and setting boundaries with others when feeling overwhelmed in order to maintain self-care.        ASSESSMENT: Current Emotional / Mental Status (status of significant symptoms):   Risk status (Self / Other harm or suicidal ideation)   Client denies current fears or concerns for personal safety.   Client denies current or recent suicidal ideation or behaviors.   Client denies current or recent homicidal ideation or behaviors.   Client denies current or recent self injurious behavior or ideation.   Client denies other safety concerns.   Client Client reports there has been no change in risk factors since their last session.     Client Client reports there  has been no change in protective factors since their last session.     A safety and risk management plan has not been developed at this time, however client was given the after-hours number / 911 should there be a change in any of these risk factors.     Appearance:   Appropriate    Eye Contact:   Good    Psychomotor Behavior: Normal    Attitude:   Cooperative    Orientation:   All   Speech    Rate / Production: Normal     Volume:  Normal    Mood:    Normal   Affect:    Appropriate    Thought Content:  Clear    Thought Form:  Coherent  Logical    Insight:    Good      Medication Review:   No current psychiatric medications prescribed     Medication Compliance:   NA     Changes in Health Issues:   None reported     Chemical Use Review:   Substance Use: Chemical use reviewed, no active concerns identified      Tobacco Use: No current tobacco use.       Collateral Reports Completed:   Not Applicable    PLAN: (Client Tasks / Therapist Tasks / Other)  Follow-up appointment scheduled. Client is assigned to increase awareness when she takes on others' responsibilities when it is not a requirement.        Mi Curiel MA, [facility]                                                        ________________________________________________________________________    Treatment Plan    Client's Name: Sola Silverman  YOB: 1965    Date: 8/2/18    Diagnoses:            Adjustment Disorders  309.28 (F43.23) With mixed anxiety and depressed mood  Psychosocial & Contextual Factors: Death of father, estrangement from sister, limited support network  WHODAS: 23    Referral / Collaboration:  Referral to another professional/service is not indicated at this time..    Anticipated number of session or this episode of care: 30      MeasurableTreatment Goal(s) related to diagnosis / functional impairment(s)  Goal 1: Client will increase understanding and engagement in the grief process.    Objective #A (Client Action)    Client  will increase understanding of steps in the grief process.  Status: New - Date: 8/2/18     Intervention(s)  Therapist will assign homework    provide educational materials on grief process.    Objective #B  Client will develop vocabulary to describe feelings of grief and loss.  Status: New - Date: 8/2/18     Intervention(s)  Therapist will assign homework    teach emotional recognition/identification.      Objective #C  Client will identify steps toward managing grief.  Status: New - Date: 8/2/18     Intervention(s)  Therapist will assign homework    teach Mindfulness, Distress Tolerance, and Emotion Regulation skills.      Goal 2: Client will decrease anxiety symptoms, and increase engaging in stress management techniques as evidence by HERMINIO-7 scores.    Objective #A (Client Action)    Status: New - Date: 8/2/18     Client will engage in a physical activity 3 times weekly for 15 minutes.    Intervention(s)  Therapist will assign homework    teach emotional regulation skills.      Objective #B  Client will use at least 3 coping skills for anxiety management in the next 3 weeks.    Status: New - Date: 8/2/18     Intervention(s)  Therapist will assign homework    teach Distress Tolerance techniques.    Objective #C  Client will identify 3 initial signs or symptoms of anxiety.  Status: New - Date: 8/2/18     Intervention(s)  Therapist will assign homework    teach emotional recognition/identification.           Client has reviewed and agreed to the above plan.      Mi Curiel [facility], MA August 8, 2018

## 2018-08-08 NOTE — MR AVS SNAPSHOT
MRN:[id number]                      After Visit Summary   8/8/2018    Sola Silverman    MRN: [id number]           Visit Information        Provider Department      8/8/2018 9:00 AM Mi Curiel [facility]        Your next 10 appointments already scheduled     Aug 21, 2018  8:00 AM CDT   Return Visit with Mi Curiel [facility] ([facility])    [address] [facility] [address]   [phone]            Aug 29, 2018  9:00 AM CDT   Return Visit with Mi Curiel [facility] ([facility])    [address] [facility] [address]   [phone]            Nov 05, 2018 10:00 AM CST   Return Visit with Alda Santamaria MD   [facility]  ([facility])    [address]  [address]  [address] [address]   [phone]              Care Instructions    Goal 1: Client will increase understanding and engagement in the grief process.    Objective #A (Client Action)    Client will increase understanding of steps in the grief process.  Status: New - Date: 8/2/18     Intervention(s)  Therapist will assign homework    provide educational materials on grief process.    Objective #B  Client will develop vocabulary to describe feelings of grief and loss.  Status: New - Date: 8/2/18     Intervention(s)  Therapist will assign homework    teach emotional recognition/identification.  .    Objective #C  Client will identify steps toward managing grief.  Status: New - Date: 8/2/18     Intervention(s)  Therapist will assign homework    teach Mindfulness, Distress Tolerance, and Emotion Regulation skills.      Goal 2: Client will decrease anxiety symptoms as evidence by HERMINIO-7 scores.    Objective #A (Client Action)    Status: New - Date: 8/2/18     Client will engage in a physical activity 3 times weekly for 15  minutes.    Intervention(s)  Therapist will assign homework    teach emotional regulation skills.  .    Objective #B  Client will use at least 3 coping skills for anxiety management in the next 3 weeks.    Status: New - Date: 8/2/18     Intervention(s)  Therapist will assign homework    teach Distress Tolerance techniques.    Objective #C  Client will identify 3 initial signs or symptoms of anxiety.  Status: New - Date: 8/2/18     Intervention(s)  Therapist will assign homework    teach emotional recognition/identification.   .               [employer] Information     [employer] gives you secure access to your electronic health record. If you see a primary care provider, you can also send messages to your care team and make appointments. If you have questions, please call your primary care clinic.  If you do not have a primary care provider, please call [phone] and they will assist you.        Care EveryWhere ID     This is your Care EveryWhere ID. This could be used by other organizations to access your [place] medical records  [id number]        Equal Access to Services     RAFAEL HAYS : Twan Qureshi, waflory verduzco, willam little, jessica milan. So [facility] [phone].    ATENCIÓN: Si habla español, tiene a schuster disposición servicios gratuitos de asistencia lingüística. Llame al [phone].    We comply with applicable federal civil rights laws and [place] laws. We do not discriminate on the basis of race, color, national origin, age, disability, sex, sexual orientation, or gender identity.

## 2018-08-08 NOTE — PATIENT INSTRUCTIONS
Goal 1: Client will increase understanding and engagement in the grief process.    Objective #A (Client Action)    Client will increase understanding of steps in the grief process.  Status: New - Date: 8/2/18     Intervention(s)  Therapist will assign homework    provide educational materials on grief process.    Objective #B  Client will develop vocabulary to describe feelings of grief and loss.  Status: New - Date: 8/2/18     Intervention(s)  Therapist will assign homework    teach emotional recognition/identification.  .    Objective #C  Client will identify steps toward managing grief.  Status: New - Date: 8/2/18     Intervention(s)  Therapist will assign homework    teach Mindfulness, Distress Tolerance, and Emotion Regulation skills.      Goal 2: Client will decrease anxiety symptoms as evidence by HERMINIO-7 scores.    Objective #A (Client Action)    Status: New - Date: 8/2/18     Client will engage in a physical activity 3 times weekly for 15 minutes.    Intervention(s)  Therapist will assign homework    teach emotional regulation skills.  .    Objective #B  Client will use at least 3 coping skills for anxiety management in the next 3 weeks.    Status: New - Date: 8/2/18     Intervention(s)  Therapist will assign homework    teach Distress Tolerance techniques.    Objective #C  Client will identify 3 initial signs or symptoms of anxiety.  Status: New - Date: 8/2/18     Intervention(s)  Therapist will assign homework    teach emotional recognition/identification.   .

## 2018-08-20 ENCOUNTER — HEALTH MAINTENANCE LETTER (OUTPATIENT)
Age: 53
End: 2018-08-20

## 2018-08-22 ENCOUNTER — OFFICE VISIT (OUTPATIENT)
Dept: PSYCHOLOGY | Facility: CLINIC | Age: 53
End: 2018-08-22
Payer: COMMERCIAL

## 2018-08-22 DIAGNOSIS — F43.23 ADJUSTMENT DISORDER WITH MIXED ANXIETY AND DEPRESSED MOOD: Primary | ICD-10-CM

## 2018-08-22 PROCEDURE — 90834 PSYTX W PT 45 MINUTES: CPT | Performed by: COUNSELOR

## 2018-08-22 ASSESSMENT — ANXIETY QUESTIONNAIRES
2. NOT BEING ABLE TO STOP OR CONTROL WORRYING: MORE THAN HALF THE DAYS
GAD7 TOTAL SCORE: 11
1. FEELING NERVOUS, ANXIOUS, OR ON EDGE: SEVERAL DAYS
IF YOU CHECKED OFF ANY PROBLEMS ON THIS QUESTIONNAIRE, HOW DIFFICULT HAVE THESE PROBLEMS MADE IT FOR YOU TO DO YOUR WORK, TAKE CARE OF THINGS AT HOME, OR GET ALONG WITH OTHER PEOPLE: SOMEWHAT DIFFICULT
5. BEING SO RESTLESS THAT IT IS HARD TO SIT STILL: SEVERAL DAYS
7. FEELING AFRAID AS IF SOMETHING AWFUL MIGHT HAPPEN: MORE THAN HALF THE DAYS
6. BECOMING EASILY ANNOYED OR IRRITABLE: SEVERAL DAYS
3. WORRYING TOO MUCH ABOUT DIFFERENT THINGS: MORE THAN HALF THE DAYS

## 2018-08-22 ASSESSMENT — PATIENT HEALTH QUESTIONNAIRE - PHQ9: 5. POOR APPETITE OR OVEREATING: MORE THAN HALF THE DAYS

## 2018-08-22 NOTE — MR AVS SNAPSHOT
MRN:[id number]                      After Visit Summary   8/22/2018    Sola Silverman    MRN: [id number]           Visit Information        Provider Department      8/22/2018 3:00 PM Mi Curiel [facility]      Your next 10 appointments already scheduled     Aug 29, 2018  9:00 AM CDT   Return Visit with [facility] ([facility])    [address] [address]   [phone]            Sep 06, 2018  9:30 AM CDT   Return Visit with Mi Curiel [facility] ([facility])    [address] [address]   [phone]            Sep 17, 2018  3:00 PM CDT   Return Visit with Mi Curiel [facility] ([facility])    [address] [address]   [phone]            Nov 05, 2018 10:00 AM CST   Return Visit with Alda Santamaria MD   [facility]  ([facility])    [address]  [address]  [address] [address] [phone]              MyChart Information     [employer] gives you secure access to your electronic health record. If you see a primary care provider, you can also send messages to your care team and make appointments. If you have questions, please call your primary care clinic.  If you do not have a primary care provider, please call [phone] and they will assist you.        Care EveryWhere ID     This is your Care EveryWhere ID. This could be used by other organizations to access your [place] medical records  [id number]        Equal Access to Services     RAFAEL HAYS AH: Twan Qureshi, waflory verduzco, qamona kaalmada sidney, jessica [facility] [phone].    ATENCIÓN: Si habla español, tiene a schuster disposición servicios gratuitos  de asistencia lingüística. Jeetarianna al [phone].    We comply with applicable federal civil rights laws and [place] laws. We do not discriminate on the basis of race, color, national origin, age, disability, sex, sexual orientation, or gender identity.

## 2018-08-22 NOTE — PROGRESS NOTES
Progress Note    Client Name: Sola Silverman  Date: 8/22/18         Service Type: Individual      Session Start Time: 3:00 pm  Session End Time: 4:00 pm      Session Length: 60 min     Session #: 5     Attendees: Client attended alone    Treatment Plan Last Reviewed: 8/2/18 (Review by 11/2/18)  PHQ-9 / HERMINIO-7 : 7 / 11     DATA      Progress Since Last Session (Related to Symptoms / Goals / Homework):   Symptoms: No change      Homework: n/a      Episode of Care Goals: Minimal progress - ACTION (Actively working towards change); Intervened by reinforcing change plan / affirming steps taken     Current / Ongoing Stressors and Concerns:   Ongoing: Work stress, grief around loss of father and disconnection from siblings, limited support system   Current: Work stressors. Client utilized session to discuss past relationship with and passing of father. Client acknowledged there is guilt associated with the passing, as she went through it without additional supports, and feels as if she may not have done the right thing. Client also notes grief surrounding the loss of relationship with her sister following the passing.     Treatment Objective(s) Addressed in This Session:   develop vocabulary to describe feelings of grief and loss.     Intervention:   CBT: Writer assisted client in identifying emotions and themes related to complicated grief symptoms        ASSESSMENT: Current Emotional / Mental Status (status of significant symptoms):   Risk status (Self / Other harm or suicidal ideation)   Client denies current fears or concerns for personal safety.   Client denies current or recent suicidal ideation or behaviors.   Client denies current or recent homicidal ideation or behaviors.   Client denies current or recent self injurious behavior or ideation.   Client denies other safety concerns.   Client Client reports there has been no change in risk factors since their last  session.     Client Client reports there has been no change in protective factors since their last session.     A safety and risk management plan has not been developed at this time, however client was given the after-hours number / 911 should there be a change in any of these risk factors.     Appearance:   Appropriate    Eye Contact:   Good    Psychomotor Behavior: Normal    Attitude:   Cooperative    Orientation:   All   Speech    Rate / Production: Normal     Volume:  Normal    Mood:    Normal   Affect:    Appropriate    Thought Content:  Clear    Thought Form:  Coherent  Logical    Insight:    Good      Medication Review:   No current psychiatric medications prescribed     Medication Compliance:   NA     Changes in Health Issues:   None reported     Chemical Use Review:   Substance Use: Chemical use reviewed, no active concerns identified      Tobacco Use: No current tobacco use.       Collateral Reports Completed:   Not Applicable    PLAN: (Client Tasks / Therapist Tasks / Other)  Client is assigned to engage in self-care until next session: reach out to one person, get movement/outside, do one additional positive activity outside of normal routine.        Mi Curiel MA, [facility]                                                        ________________________________________________________________________    Treatment Plan    Client's Name: Sola Silverman  YOB: 1965    Date: 8/2/18    Diagnoses:            Adjustment Disorders  309.28 (F43.23) With mixed anxiety and depressed mood  Psychosocial & Contextual Factors: Death of father, estrangement from sister, limited support network  WHODAS: 23    Referral / Collaboration:  Referral to another professional/service is not indicated at this time..    Anticipated number of session or this episode of care: 30      MeasurableTreatment Goal(s) related to diagnosis / functional impairment(s)  Goal 1: Client will increase understanding and engagement  in the grief process.    Objective #A (Client Action)    Client will increase understanding of steps in the grief process.  Status: New - Date: 8/2/18     Intervention(s)  Therapist will assign homework    provide educational materials on grief process.    Objective #B  Client will develop vocabulary to describe feelings of grief and loss.  Status: New - Date: 8/2/18     Intervention(s)  Therapist will assign homework    teach emotional recognition/identification.      Objective #C  Client will identify steps toward managing grief.  Status: New - Date: 8/2/18     Intervention(s)  Therapist will assign homework    teach Mindfulness, Distress Tolerance, and Emotion Regulation skills.      Goal 2: Client will decrease anxiety symptoms, and increase engaging in stress management techniques as evidence by HERMINIO-7 scores.    Objective #A (Client Action)    Status: New - Date: 8/2/18     Client will engage in a physical activity 3 times weekly for 15 minutes.    Intervention(s)  Therapist will assign homework    teach emotional regulation skills.      Objective #B  Client will use at least 3 coping skills for anxiety management in the next 3 weeks.    Status: New - Date: 8/2/18     Intervention(s)  Therapist will assign homework    teach Distress Tolerance techniques.    Objective #C  Client will identify 3 initial signs or symptoms of anxiety.  Status: New - Date: 8/2/18     Intervention(s)  Therapist will assign homework    teach emotional recognition/identification.           Client has reviewed and agreed to the above plan.      Mi Curiel [facility], [place] August 22, 2018

## 2018-08-24 ASSESSMENT — PATIENT HEALTH QUESTIONNAIRE - PHQ9: SUM OF ALL RESPONSES TO PHQ QUESTIONS 1-9: 7

## 2018-08-24 ASSESSMENT — ANXIETY QUESTIONNAIRES: GAD7 TOTAL SCORE: 11

## 2018-08-29 ENCOUNTER — OFFICE VISIT (OUTPATIENT)
Dept: PSYCHOLOGY | Facility: CLINIC | Age: 53
End: 2018-08-29
Payer: COMMERCIAL

## 2018-08-29 DIAGNOSIS — F43.23 ADJUSTMENT DISORDER WITH MIXED ANXIETY AND DEPRESSED MOOD: Primary | ICD-10-CM

## 2018-08-29 PROCEDURE — 90834 PSYTX W PT 45 MINUTES: CPT | Performed by: COUNSELOR

## 2018-08-29 NOTE — PROGRESS NOTES
Progress Note    Client Name: Sola Silverman  Date: 8/29/18         Service Type: Individual      Session Start Time: 9:00 am  Session End Time: 9:48      Session Length: 48 min     Session #: 6     Attendees: Client attended alone    Treatment Plan Last Reviewed: 8/2/18 (Review by 11/2/18)  PHQ-9 / HERMINIO-7 : --     DATA      Progress Since Last Session (Related to Symptoms / Goals / Homework):   Symptoms: No change      Homework: Achieved / completed to satisfaction - Client reports attending a [employer] game with friend and cleaning her garage as self-care homework.      Episode of Care Goals: Minimal progress - ACTION (Actively working towards change); Intervened by reinforcing change plan / affirming steps taken     Current / Ongoing Stressors and Concerns:   Ongoing: Work stress, grief around loss of father and disconnection from siblings, limited support system   Current: Work stressors. Client utilized session to discuss guilt and anger associated with father's death and relationship with her sister.     Treatment Objective(s) Addressed in This Session:   develop vocabulary to describe feelings of grief and loss.     Intervention:   CBT: Writer assisted client in identifying emotions and themes related to complicated grief symptoms        ASSESSMENT: Current Emotional / Mental Status (status of significant symptoms):   Risk status (Self / Other harm or suicidal ideation)   Client denies current fears or concerns for personal safety.   Client denies current or recent suicidal ideation or behaviors.   Client denies current or recent homicidal ideation or behaviors.   Client denies current or recent self injurious behavior or ideation.   Client denies other safety concerns.   Client Client reports there has been no change in risk factors since their last session.     Client Client reports there has been no change in protective factors since their last session.     A  safety and risk management plan has not been developed at this time, however client was given the after-hours number / 911 should there be a change in any of these risk factors.     Appearance:   Appropriate    Eye Contact:   Good    Psychomotor Behavior: Normal    Attitude:   Cooperative    Orientation:   All   Speech    Rate / Production: Normal     Volume:  Normal    Mood:    Normal   Affect:    Appropriate    Thought Content:  Clear    Thought Form:  Coherent  Logical    Insight:    Good      Medication Review:   No current psychiatric medications prescribed     Medication Compliance:   NA     Changes in Health Issues:   None reported     Chemical Use Review:   Substance Use: Chemical use reviewed, no active concerns identified      Tobacco Use: No current tobacco use.       Collateral Reports Completed:   Not Applicable    PLAN: (Client Tasks / Therapist Tasks / Other)  Client is assigned to engage in self-care until next session: reach out to one person, get movement/outside, do one additional positive activity outside of normal routine.  Plan for future sessions: develop additional self-care plan, assign therapeutic letter to sister.      Mi Curiel MA, [facility]                                                        ________________________________________________________________________    Treatment Plan    Client's Name: Sola Silverman  YOB: 1965    Date: 8/2/18    Diagnoses:            Adjustment Disorders  309.28 (F43.23) With mixed anxiety and depressed mood  Psychosocial & Contextual Factors: Death of father, estrangement from sister, limited support network  WHODAS: 23    Referral / Collaboration:  Referral to another professional/service is not indicated at this time..    Anticipated number of session or this episode of care: 30      MeasurableTreatment Goal(s) related to diagnosis / functional impairment(s)  Goal 1: Client will increase understanding and engagement in the grief  process.    Objective #A (Client Action)    Client will increase understanding of steps in the grief process.  Status: New - Date: 8/2/18     Intervention(s)  Therapist will assign homework    provide educational materials on grief process.    Objective #B  Client will develop vocabulary to describe feelings of grief and loss.  Status: New - Date: 8/2/18     Intervention(s)  Therapist will assign homework    teach emotional recognition/identification.      Objective #C  Client will identify steps toward managing grief.  Status: New - Date: 8/2/18     Intervention(s)  Therapist will assign homework    teach Mindfulness, Distress Tolerance, and Emotion Regulation skills.      Goal 2: Client will decrease anxiety symptoms, and increase engaging in stress management techniques as evidence by HERMINIO-7 scores.    Objective #A (Client Action)    Status: New - Date: 8/2/18     Client will engage in a physical activity 3 times weekly for 15 minutes.    Intervention(s)  Therapist will assign homework    teach emotional regulation skills.      Objective #B  Client will use at least 3 coping skills for anxiety management in the next 3 weeks.    Status: New - Date: 8/2/18     Intervention(s)  Therapist will assign homework    teach Distress Tolerance techniques.    Objective #C  Client will identify 3 initial signs or symptoms of anxiety.  Status: New - Date: 8/2/18     Intervention(s)  Therapist will assign homework    teach emotional recognition/identification.           Client has reviewed and agreed to the above plan.      Mi Curiel [facility], [place] August 28, 2018

## 2018-08-29 NOTE — MR AVS SNAPSHOT
MRN:[id number]                      After Visit Summary   8/29/2018    Sola Silverman    MRN: [id number]           Visit Information        Provider Department      8/29/2018 9:00 AM Mi Curiel [facility]      Your next 10 appointments already scheduled     Sep 06, 2018  9:30 AM CDT   Return Visit with JOE Cali   [facility] ([facility])    [address] [address]   [phone]            Sep 17, 2018  3:00 PM CDT   Return Visit with JOE Cali   [facility] ([facility])    [address] [facility] [address]   [phone]            Nov 05, 2018 10:00 AM CST   Return Visit with Alda Santamaria MD   [facility]  ([facility])    [address]  [address]  [address] [id number] [phone]              MyChart Information     [employer] gives you secure access to your electronic health record. If you see a primary care provider, you can also send messages to your care team and make appointments. If you have questions, please call your primary care clinic.  If you do not have a primary care provider, please call [phone] and they will assist you.        Care EveryWhere ID     This is your Care EveryWhere ID. This could be used by other organizations to access your [place] medical records  [id number]        Equal Access to Services     RAFAEL HAYS AH: Hadii aad ku hadasho Soomaali, waaxda luqadaha, qaybta kaalmada adeegyada, waxchio campbellin hayaan jolilo milan. So [facility] [phone].    ATENCIÓN: Si habla español, tiene a schuster disposición servicios gratuitos de asistencia lingüística. Llame al [phone].    We comply with applicable federal civil rights laws and [place] laws. We do not discriminate on the basis of race, color, national origin,  age, disability, sex, sexual orientation, or gender identity.

## 2018-09-06 ENCOUNTER — OFFICE VISIT (OUTPATIENT)
Dept: PSYCHOLOGY | Facility: CLINIC | Age: 53
End: 2018-09-06
Payer: COMMERCIAL

## 2018-09-06 DIAGNOSIS — F43.23 ADJUSTMENT DISORDER WITH MIXED ANXIETY AND DEPRESSED MOOD: Primary | ICD-10-CM

## 2018-09-06 PROCEDURE — 90834 PSYTX W PT 45 MINUTES: CPT | Performed by: COUNSELOR

## 2018-09-06 NOTE — PROGRESS NOTES
Progress Note    Client Name: Sola Silverman  Date: 9/6/18         Service Type: Individual      Session Start Time: 9:30 am  Session End Time: 9:48      Session Length: 48 min     Session #: 7     Attendees: Client attended alone    Treatment Plan Last Reviewed: 8/2/18 (Review by 11/2/18)  PHQ-9 / HERMINIO-7 : --     DATA      Progress Since Last Session (Related to Symptoms / Goals / Homework):   Symptoms: No change      Homework: Achieved / completed to satisfaction - Client reports attending a [employer] game with friend and cleaning her garage as self-care homework.      Episode of Care Goals: Minimal progress - ACTION (Actively working towards change); Intervened by reinforcing change plan / affirming steps taken     Current / Ongoing Stressors and Concerns:   Ongoing: Work stress, grief around loss of father and disconnection from siblings, limited support system   Current: Work stressors. Client utilized session to discuss guilt and anger associated with father's death and relationship with her sister.     Treatment Objective(s) Addressed in This Session:   develop vocabulary to describe feelings of grief and loss.     Intervention:   CBT: Writer assisted client in identifying emotions and themes related to complicated grief symptoms        ASSESSMENT: Current Emotional / Mental Status (status of significant symptoms):   Risk status (Self / Other harm or suicidal ideation)   Client denies current fears or concerns for personal safety.   Client denies current or recent suicidal ideation or behaviors.   Client denies current or recent homicidal ideation or behaviors.   Client denies current or recent self injurious behavior or ideation.   Client denies other safety concerns.   Client Client reports there has been no change in risk factors since their last session.     Client Client reports there has been no change in protective factors since their last session.     A  safety and risk management plan has not been developed at this time, however client was given the after-hours number / 911 should there be a change in any of these risk factors.     Appearance:   Appropriate    Eye Contact:   Good    Psychomotor Behavior: Normal    Attitude:   Cooperative    Orientation:   All   Speech    Rate / Production: Normal     Volume:  Normal    Mood:    Normal   Affect:    Appropriate    Thought Content:  Clear    Thought Form:  Coherent  Logical    Insight:    Good      Medication Review:   No current psychiatric medications prescribed     Medication Compliance:   NA     Changes in Health Issues:   None reported     Chemical Use Review:   Substance Use: Chemical use reviewed, no active concerns identified      Tobacco Use: No current tobacco use.       Collateral Reports Completed:   Not Applicable    PLAN: (Client Tasks / Therapist Tasks / Other)  Client is assigned to engage in self-care until next session: reach out to one person, get movement/outside, do one additional positive activity outside of normal routine. Client assigned to write a letter to sister without sending, discuss emotions that arise.        Mi Curiel MA, [facility]                                                        ________________________________________________________________________    Treatment Plan    Client's Name: Sola Silverman  YOB: 1965    Date: 8/2/18    Diagnoses:            Adjustment Disorders  309.28 (F43.23) With mixed anxiety and depressed mood  Psychosocial & Contextual Factors: Death of father, estrangement from sister, limited support network  WHODAS: 23    Referral / Collaboration:  Referral to another professional/service is not indicated at this time..    Anticipated number of session or this episode of care: 30      MeasurableTreatment Goal(s) related to diagnosis / functional impairment(s)  Goal 1: Client will increase understanding and engagement in the grief  process.    Objective #A (Client Action)    Client will increase understanding of steps in the grief process.  Status: New - Date: 8/2/18     Intervention(s)  Therapist will assign homework    provide educational materials on grief process.    Objective #B  Client will develop vocabulary to describe feelings of grief and loss.  Status: New - Date: 8/2/18     Intervention(s)  Therapist will assign homework    teach emotional recognition/identification.      Objective #C  Client will identify steps toward managing grief.  Status: New - Date: 8/2/18     Intervention(s)  Therapist will assign homework    teach Mindfulness, Distress Tolerance, and Emotion Regulation skills.      Goal 2: Client will decrease anxiety symptoms, and increase engaging in stress management techniques as evidence by HERMINIO-7 scores.    Objective #A (Client Action)    Status: New - Date: 8/2/18     Client will engage in a physical activity 3 times weekly for 15 minutes.    Intervention(s)  Therapist will assign homework    teach emotional regulation skills.      Objective #B  Client will use at least 3 coping skills for anxiety management in the next 3 weeks.    Status: New - Date: 8/2/18     Intervention(s)  Therapist will assign homework    teach Distress Tolerance techniques.    Objective #C  Client will identify 3 initial signs or symptoms of anxiety.  Status: New - Date: 8/2/18     Intervention(s)  Therapist will assign homework    teach emotional recognition/identification.           Client has reviewed and agreed to the above plan.      Mi Curiel [facility], MA 9/6/18

## 2018-09-06 NOTE — MR AVS SNAPSHOT
MRN:[id number]                      After Visit Summary   9/6/2018    Sola Silverman    MRN: [id number]           Visit Information        Provider Department      9/6/2018 9:30 AM Mi Curiel LP[facility] Generic      Your next 10 appointments already scheduled     Sep 17, 2018  3:00 PM CDT   Return Visit with JOE Cali   [facility] ([facility])    [address] [address]   [phone]            Sep 27, 2018  8:30 AM CDT   Return Visit with JOE Cali   [facility] ([facility])    [address] [address]   [phone]            Nov 05, 2018 10:00 AM CST   Return Visit with Alda Santamaria MD   [facility]  ([facility])    [address]  [address]  [address] [id number] [phone]              MyChart Information     [employer] ( [employer] ) gives you secure access to your electronic health record. If you see a primary care provider, you can also send messages to your care team and make appointments. If you have questions, please call your primary care clinic.  If you do not have a primary care provider, please call [phone] and they will assist you.        Care EveryWhere ID     This is your Care EveryWhere ID. This could be used by other organizations to access your [place] medical records  [id number]        Equal Access to Services     RAFAEL HAYS AH: Hadii aad ku hadasho Soomaali, waaxda luqadaha, qaybta kaalmada adeegyada, waxchio campbellin hayaan jolilo milan. So [facility] [phone].    ATENCIÓN: Si habla español, tiene a schuster disposición servicios gratuitos de asistencia lingüística. Llame al [phone].    We comply with applicable federal civil rights laws and [place] laws. We do not discriminate on the basis of race, color, national origin, age,  disability, sex, sexual orientation, or gender identity.

## 2018-09-17 ENCOUNTER — OFFICE VISIT (OUTPATIENT)
Dept: PSYCHOLOGY | Facility: CLINIC | Age: 53
End: 2018-09-17
Payer: COMMERCIAL

## 2018-09-17 DIAGNOSIS — F43.23 ADJUSTMENT DISORDER WITH MIXED ANXIETY AND DEPRESSED MOOD: Primary | ICD-10-CM

## 2018-09-17 PROCEDURE — 90834 PSYTX W PT 45 MINUTES: CPT | Performed by: COUNSELOR

## 2018-09-17 ASSESSMENT — ANXIETY QUESTIONNAIRES
3. WORRYING TOO MUCH ABOUT DIFFERENT THINGS: MORE THAN HALF THE DAYS
IF YOU CHECKED OFF ANY PROBLEMS ON THIS QUESTIONNAIRE, HOW DIFFICULT HAVE THESE PROBLEMS MADE IT FOR YOU TO DO YOUR WORK, TAKE CARE OF THINGS AT HOME, OR GET ALONG WITH OTHER PEOPLE: SOMEWHAT DIFFICULT
6. BECOMING EASILY ANNOYED OR IRRITABLE: MORE THAN HALF THE DAYS
GAD7 TOTAL SCORE: 10
7. FEELING AFRAID AS IF SOMETHING AWFUL MIGHT HAPPEN: MORE THAN HALF THE DAYS
5. BEING SO RESTLESS THAT IT IS HARD TO SIT STILL: NOT AT ALL
2. NOT BEING ABLE TO STOP OR CONTROL WORRYING: SEVERAL DAYS
1. FEELING NERVOUS, ANXIOUS, OR ON EDGE: MORE THAN HALF THE DAYS

## 2018-09-17 ASSESSMENT — PATIENT HEALTH QUESTIONNAIRE - PHQ9: 5. POOR APPETITE OR OVEREATING: SEVERAL DAYS

## 2018-09-17 NOTE — PROGRESS NOTES
Progress Note    Client Name: Sola Silverman  Date: 9/17/18         Service Type: Individual      Session Start Time: 3:00 pm  Session End Time: 3:50 pm      Session Length: 50 min     Session #: 8     Attendees: Client attended alone    Treatment Plan Last Reviewed: 8/2/18 (Review by 11/2/18)  PHQ-9 / HERMINIO-7 : 5/10   DATA      Progress Since Last Session (Related to Symptoms / Goals / Homework):   Symptoms: No change      Homework: Did not complete - Client reports not completing homework of writing letter to sister due to anger it arose when she thought about it.       Episode of Care Goals: Satisfactory progress - ACTION (Actively working towards change); Intervened by reinforcing change plan / affirming steps taken     Current / Ongoing Stressors and Concerns:   Ongoing: Work stress, grief around loss of father and disconnection from siblings, limited support system   Current: Client reports lessened stressors over past week, and note engagement in social activities and making positive future plans as aiding mood. Client reports continued grief symptoms and irritability with others.      Treatment Objective(s) Addressed in This Session:   develop vocabulary to describe feelings of grief and loss.   use at least 3 coping skills for anxiety management in the next 3 weeks.  identity initial signs of anxiety     Intervention:   CBT: Writer assisted client in identifying emotions and themes related to complicated grief symptoms  DBT: Coached on Emotion Regulation techniques and it's aid in limiting irritability. Offered psychoeducation around understanding messages of emotions, including Primary and Secondary emotions, in order to identify how to effectively respond.   Motivational Interviewing: Reflected and reinforced change language.        ASSESSMENT: Current Emotional / Mental Status (status of significant symptoms):   Risk status (Self / Other harm or suicidal  ideation)   Client denies current fears or concerns for personal safety.   Client denies current or recent suicidal ideation or behaviors.   Client denies current or recent homicidal ideation or behaviors.   Client denies current or recent self injurious behavior or ideation.   Client denies other safety concerns.   Client Client reports there has been no change in risk factors since their last session.     Client Client reports there has been no change in protective factors since their last session.     A safety and risk management plan has not been developed at this time, however client was given the after-hours number / 911 should there be a change in any of these risk factors.     Appearance:   Appropriate    Eye Contact:   Good    Psychomotor Behavior: Normal    Attitude:   Cooperative    Orientation:   All   Speech    Rate / Production: Normal     Volume:  Normal    Mood:    Normal   Affect:    Appropriate    Thought Content:  Clear    Thought Form:  Coherent  Logical    Insight:    Good      Medication Review:   No current psychiatric medications prescribed     Medication Compliance:   NA     Changes in Health Issues:   None reported     Chemical Use Review:   Substance Use: Chemical use reviewed, no active concerns identified      Tobacco Use: No current tobacco use.       Collateral Reports Completed:   Not Applicable    PLAN: (Client Tasks / Therapist Tasks / Other)  Client is assigned to engage in self-care until next session: reach out to one person, get movement/outside, do one additional positive activity outside of normal routine. Client assigned to track increases in irritability.  Plan: address not writing letter        Mi Curiel MA, [facility]                                                        ________________________________________________________________________    Treatment Plan    Client's Name: Sola Silverman  YOB: 1965    Date: 8/2/18    Diagnoses:            Adjustment  Disorders  309.28 (F43.23) With mixed anxiety and depressed mood  Psychosocial & Contextual Factors: Death of father, estrangement from sister, limited support network  WHODAS: 23    Referral / Collaboration:  Referral to another professional/service is not indicated at this time..    Anticipated number of session or this episode of care: 30      MeasurableTreatment Goal(s) related to diagnosis / functional impairment(s)  Goal 1: Client will increase understanding and engagement in the grief process.    Objective #A (Client Action)    Client will increase understanding of steps in the grief process.  Status: New - Date: 8/2/18     Intervention(s)  Therapist will assign homework    provide educational materials on grief process.    Objective #B  Client will develop vocabulary to describe feelings of grief and loss.  Status: New - Date: 8/2/18     Intervention(s)  Therapist will assign homework    teach emotional recognition/identification.      Objective #C  Client will identify steps toward managing grief.  Status: New - Date: 8/2/18     Intervention(s)  Therapist will assign homework    teach Mindfulness, Distress Tolerance, and Emotion Regulation skills.      Goal 2: Client will decrease anxiety symptoms, and increase engaging in stress management techniques as evidence by HERMINIO-7 scores.    Objective #A (Client Action)    Status: New - Date: 8/2/18     Client will engage in a physical activity 3 times weekly for 15 minutes.    Intervention(s)  Therapist will assign homework    teach emotional regulation skills.      Objective #B  Client will use at least 3 coping skills for anxiety management in the next 3 weeks.    Status: New - Date: 8/2/18     Intervention(s)  Therapist will assign homework    teach Distress Tolerance techniques.    Objective #C  Client will identify 3 initial signs or symptoms of anxiety.  Status: New - Date: 8/2/18     Intervention(s)  Therapist will assign homework    teach emotional  recognition/identification.           Client has reviewed and agreed to the above plan.      Mi Curiel [facility], MA 9/17/18

## 2018-09-18 DIAGNOSIS — N95.2 ATROPHIC VAGINITIS: ICD-10-CM

## 2018-09-18 RX ORDER — ESTRADIOL 10 UG/1
TABLET VAGINAL
Qty: 24 TABLET | Refills: 3 | Status: SHIPPED | OUTPATIENT
Start: 2018-09-18 | End: 2020-02-11

## 2018-09-18 ASSESSMENT — ANXIETY QUESTIONNAIRES: GAD7 TOTAL SCORE: 10

## 2018-09-18 ASSESSMENT — PATIENT HEALTH QUESTIONNAIRE - PHQ9: SUM OF ALL RESPONSES TO PHQ QUESTIONS 1-9: 5

## 2018-09-18 NOTE — TELEPHONE ENCOUNTER
Patient needing refill of vagifem. Patient was recently seen and plan was to continue this medication. Rx sent.

## 2018-09-24 ENCOUNTER — RADIANT APPOINTMENT (OUTPATIENT)
Dept: MAMMOGRAPHY | Facility: CLINIC | Age: 53
End: 2018-09-24
Payer: COMMERCIAL

## 2018-09-24 DIAGNOSIS — Z12.31 VISIT FOR SCREENING MAMMOGRAM: ICD-10-CM

## 2018-09-26 ENCOUNTER — OFFICE VISIT (OUTPATIENT)
Dept: PSYCHOLOGY | Facility: CLINIC | Age: 53
End: 2018-09-26
Payer: COMMERCIAL

## 2018-09-26 DIAGNOSIS — F43.23 ADJUSTMENT DISORDER WITH MIXED ANXIETY AND DEPRESSED MOOD: Primary | ICD-10-CM

## 2018-09-26 PROCEDURE — 90834 PSYTX W PT 45 MINUTES: CPT | Performed by: COUNSELOR

## 2018-09-26 NOTE — MR AVS SNAPSHOT
MRN:[id number]                      After Visit Summary   9/26/2018    Sola Silverman    MRN: [id number]           Visit Information        Provider Department      9/26/2018 9:00 AM Mi Curiel [facility] Generic      Your next 10 appointments already scheduled     Oct 17, 2018  9:00 AM CDT   Return Visit with JOE Cali   [facility] ([facility])    [address] [address]   [phone]            Nov 05, 2018 10:00 AM CST   Return Visit with Alda Santamaria MD   [facility]  ([facility])    [address],[facility] [address]  [address]  [address] [address] [phone]            Nov 08, 2018  9:30 AM CST   Return Visit with JOE Cali   [facility] ([facility])    [address] [address] [phone]              MyChart Information     [employer] gives you secure access to your electronic health record. If you see a primary care provider, you can also send messages to your care team and make appointments. If you have questions, please call your primary care clinic.  If you do not have a primary care provider, please call [phone] and they will assist you.        Care EveryWhere ID     This is your Care EveryWhere ID. This could be used by other organizations to access your [place] medical records  [id number]        Equal Access to Services     RAFAEL HAYS AH: Hadii aad ku hadasho Soomaali, waaxda luqadaha, qaybta kaalmada adeegyada, waxchoi campbellamanda milan. So [facility] [phone].    ATENCIÓN: Si habla español, tiene a schuster disposición servicios gratuitos de asistencia lingüística. Llame al [phone].    We comply with applicable federal civil rights laws and [place] laws. We do not discriminate on the basis of race, color, national origin,  age, disability, sex, sexual orientation, or gender identity.

## 2018-09-26 NOTE — PROGRESS NOTES
"                                             Progress Note    Client Name: Sola Silverman  Date: 9/26/18         Service Type: Individual      Session Start Time: 9:00 am  Session End Time: 9:50 am      Session Length: 50 min     Session #: 9     Attendees: Client attended alone    Treatment Plan Last Reviewed: 8/2/18 (Review by 11/2/18)  PHQ-9 / HERMINIO-7 : 5/10   DATA      Progress Since Last Session (Related to Symptoms / Goals / Homework):   Symptoms: No change      Homework: Did not complete - Client reports not completing homework of writing letter to sister due to anger it arose when she thought about it.       Episode of Care Goals: Satisfactory progress - ACTION (Actively working towards change); Intervened by reinforcing change plan / affirming steps taken     Current / Ongoing Stressors and Concerns:   Ongoing: Work stress, grief around loss of father and disconnection from siblings, limited support system   Current: Client reports lessened stressors over past week, and note engagement in social activities and making positive future plans as aiding mood. Client reports continued grief symptoms and irritability with others.      Treatment Objective(s) Addressed in This Session:   identity initial signs of anxiety   use at least 3 coping skills for anxiety management in the next 3 weeks.       Intervention:   CBT: Writer assisted client in identifying irritability triggers and coached on vulnerabilities that may influence \"quick triggers\".  DBT: Coached on Cycle of Emotions, and how altering interpretations as well as using calming techniques prior to attempting to problem solve may assist in decreasing irritability.  Motivational Interviewing: Reflected and reinforced change language.        ASSESSMENT: Current Emotional / Mental Status (status of significant symptoms):   Risk status (Self / Other harm or suicidal ideation)   Client denies current fears or concerns for personal safety.   Client denies " current or recent suicidal ideation or behaviors.   Client denies current or recent homicidal ideation or behaviors.   Client denies current or recent self injurious behavior or ideation.   Client denies other safety concerns.   Client Client reports there has been no change in risk factors since their last session.     Client Client reports there has been no change in protective factors since their last session.     A safety and risk management plan has not been developed at this time, however client was given the after-hours number / 911 should there be a change in any of these risk factors.     Appearance:   Appropriate    Eye Contact:   Good    Psychomotor Behavior: Normal    Attitude:   Cooperative    Orientation:   All   Speech    Rate / Production: Normal     Volume:  Normal    Mood:    Normal   Affect:    Appropriate    Thought Content:  Clear    Thought Form:  Coherent  Logical    Insight:    Good      Medication Review:   No current psychiatric medications prescribed     Medication Compliance:   NA     Changes in Health Issues:   None reported     Chemical Use Review:   Substance Use: Chemical use reviewed, no active concerns identified      Tobacco Use: No current tobacco use.       Collateral Reports Completed:   Not Applicable    PLAN: (Client Tasks / Therapist Tasks / Other)  Client is assigned to engage in self-care until next session: reach out to one person, get movement/outside, do one additional positive activity outside of normal routine.  Plan: address not writing letter        Mi uCriel MA, [facility]                                                        ________________________________________________________________________    Treatment Plan    Client's Name: Sola Silverman  YOB: 1965    Date: 8/2/18    Diagnoses:            Adjustment Disorders  309.28 (F43.23) With mixed anxiety and depressed mood  Psychosocial & Contextual Factors: Death of father, estrangement from  sister, limited support network  WHODAS: 23    Referral / Collaboration:  Referral to another professional/service is not indicated at this time..    Anticipated number of session or this episode of care: 30      MeasurableTreatment Goal(s) related to diagnosis / functional impairment(s)  Goal 1: Client will increase understanding and engagement in the grief process.    Objective #A (Client Action)    Client will increase understanding of steps in the grief process.  Status: New - Date: 8/2/18     Intervention(s)  Therapist will assign homework    provide educational materials on grief process.    Objective #B  Client will develop vocabulary to describe feelings of grief and loss.  Status: New - Date: 8/2/18     Intervention(s)  Therapist will assign homework    teach emotional recognition/identification.      Objective #C  Client will identify steps toward managing grief.  Status: New - Date: 8/2/18     Intervention(s)  Therapist will assign homework    teach Mindfulness, Distress Tolerance, and Emotion Regulation skills.      Goal 2: Client will decrease anxiety symptoms, and increase engaging in stress management techniques as evidence by HERMINIO-7 scores.    Objective #A (Client Action)    Status: New - Date: 8/2/18     Client will engage in a physical activity 3 times weekly for 15 minutes.    Intervention(s)  Therapist will assign homework    teach emotional regulation skills.      Objective #B  Client will use at least 3 coping skills for anxiety management in the next 3 weeks.    Status: New - Date: 8/2/18     Intervention(s)  Therapist will assign homework    teach Distress Tolerance techniques.    Objective #C  Client will identify 3 initial signs or symptoms of anxiety.  Status: New - Date: 8/2/18     Intervention(s)  Therapist will assign homework    teach emotional recognition/identification.           Client has reviewed and agreed to the above plan.      Mi Curiel [facility], MA 9/26/18

## 2018-10-17 ENCOUNTER — OFFICE VISIT (OUTPATIENT)
Dept: PSYCHOLOGY | Facility: CLINIC | Age: 53
End: 2018-10-17
Payer: COMMERCIAL

## 2018-10-17 DIAGNOSIS — F43.23 ADJUSTMENT DISORDER WITH MIXED ANXIETY AND DEPRESSED MOOD: Primary | ICD-10-CM

## 2018-10-17 PROCEDURE — 90834 PSYTX W PT 45 MINUTES: CPT | Performed by: COUNSELOR

## 2018-10-17 NOTE — MR AVS SNAPSHOT
MRN:[id number]                      After Visit Summary   10/17/2018    Sola Silverman    MRN: [id number]           Visit Information        Provider Department      10/17/2018 9:00 AM [facility]      Your next 10 appointments already scheduled     Nov 05, 2018 10:00 AM CST   Return Visit with Alda Santamaria MD   [facility]  ([facility])    [facility]  [address] [address]  [address]  [address] [address]   [phone]            Nov 08, 2018  9:30 AM CST   Return Visit with JOE Cali   [facility] ([facility])    [address] [id number] [phone]            Nov 15, 2018  9:30 AM CST   Return Visit with JOE Cali   [facility] ([facility])    [address] [id number] [phone]            Nov 26, 2018  5:00 PM CST   Return Visit with JOE Cali   [facility] ([facility])    [address] [id number] [phone]              MyChart Information     [employer] gives you secure access to your electronic health record. If you see a primary care provider, you can also send messages to your care team and make appointments. If you have questions, please call your primary care clinic.  If you do not have a primary care provider, please call [phone] and they will assist you.        Care EveryWhere ID     This is your Care EveryWhere ID. This could be used by other organizations to access your [place] medical records  [id number]        Equal Access to Services     RAFAEL HAYS AH: Hadii elie Qureshi, waflory lutamiko, qaybta kaalmada sidney, jessica milan. So [facility] [phone].    ATENCIÓN: Si habla español, tiene a schuster disposición servicios  leoos de asistencia lingüística. Rosanna al [phone].    We comply with applicable federal civil rights laws and [place] laws. We do not discriminate on the basis of race, color, national origin, age, disability, sex, sexual orientation, or gender identity.

## 2018-10-17 NOTE — PROGRESS NOTES
Progress Note    Client Name: Sola Silverman  Date: 10/17/18         Service Type: Individual      Session Start Time: 9:00 am  Session End Time: 9:50 am      Session Length: 50 min     Session #: 10     Attendees: Client attended alone    Treatment Plan Last Reviewed: 8/2/18 (Review by 11/2/18)  PHQ-9 / HERMINIO-7 :---  DATA      Progress Since Last Session (Related to Symptoms / Goals / Homework):   Symptoms: No change      Homework: Achieved / completed to satisfaction       Episode of Care Goals: Satisfactory progress - ACTION (Actively working towards change); Intervened by reinforcing change plan / affirming steps taken     Current / Ongoing Stressors and Concerns:   Ongoing: Work stress, grief around loss of father and disconnection from siblings, limited support system   Current: Client discussed previous vacation and positive activities she engaged in for self-care. Discussed conflict at work and difficulty engaging with her boss. Identified barriers to effective assertiveness with others.      Treatment Objective(s) Addressed in This Session:   identity initial signs of anxiety   use at least 3 coping skills for anxiety management in the next 3 weeks.       Intervention:   CBT: Discussed core beliefs that interfere with asserting or setting boundaries with others.  DBT: Coached on ROMAN assertiveness skills.  Motivational Interviewing: Reflected and reinforced change language.        ASSESSMENT: Current Emotional / Mental Status (status of significant symptoms):   Risk status (Self / Other harm or suicidal ideation)   Client denies current fears or concerns for personal safety.   Client denies current or recent suicidal ideation or behaviors.   Client denies current or recent homicidal ideation or behaviors.   Client denies current or recent self injurious behavior or ideation.   Client denies other safety concerns.   Client Client reports there has been no  change in risk factors since their last session.     Client Client reports there has been no change in protective factors since their last session.     A safety and risk management plan has not been developed at this time, however client was given the after-hours number / 911 should there be a change in any of these risk factors.     Appearance:   Appropriate    Eye Contact:   Good    Psychomotor Behavior: Normal    Attitude:   Cooperative    Orientation:   All   Speech    Rate / Production: Normal     Volume:  Normal    Mood:    Normal   Affect:    Appropriate    Thought Content:  Clear    Thought Form:  Coherent  Logical    Insight:    Good      Medication Review:   No current psychiatric medications prescribed     Medication Compliance:   NA     Changes in Health Issues:   None reported     Chemical Use Review:   Substance Use: Chemical use reviewed, no active concerns identified      Tobacco Use: No current tobacco use.       Collateral Reports Completed:   Not Applicable    PLAN: (Client Tasks / Therapist Tasks / Other)  Client is assigned to complete ROMAN assertiveness handout practice. Plan to discuss possible EMDR referral in next session to address complicated grief symptoms.        Mi Curiel MA, [facility]                                                        ________________________________________________________________________    Treatment Plan    Client's Name: Sola Silverman  YOB: 1965    Date: 8/2/18    Diagnoses:            Adjustment Disorders  309.28 (F43.23) With mixed anxiety and depressed mood  Psychosocial & Contextual Factors: Death of father, estrangement from sister, limited support network  WHODAS: 23    Referral / Collaboration:  Referral to another professional/service is not indicated at this time..    Anticipated number of session or this episode of care: 30      MeasurableTreatment Goal(s) related to diagnosis / functional impairment(s)  Goal 1: Client will  increase understanding and engagement in the grief process.    Objective #A (Client Action)    Client will increase understanding of steps in the grief process.  Status: New - Date: 8/2/18     Intervention(s)  Therapist will assign homework    provide educational materials on grief process.    Objective #B  Client will develop vocabulary to describe feelings of grief and loss.  Status: New - Date: 8/2/18     Intervention(s)  Therapist will assign homework    teach emotional recognition/identification.      Objective #C  Client will identify steps toward managing grief.  Status: New - Date: 8/2/18     Intervention(s)  Therapist will assign homework    teach Mindfulness, Distress Tolerance, and Emotion Regulation skills.      Goal 2: Client will decrease anxiety symptoms, and increase engaging in stress management techniques as evidence by HERMINIO-7 scores.    Objective #A (Client Action)    Status: New - Date: 8/2/18     Client will engage in a physical activity 3 times weekly for 15 minutes.    Intervention(s)  Therapist will assign homework    teach emotional regulation skills.      Objective #B  Client will use at least 3 coping skills for anxiety management in the next 3 weeks.    Status: New - Date: 8/2/18     Intervention(s)  Therapist will assign homework    teach Distress Tolerance techniques.    Objective #C  Client will identify 3 initial signs or symptoms of anxiety.  Status: New - Date: 8/2/18     Intervention(s)  Therapist will assign homework    teach emotional recognition/identification.           Client has reviewed and agreed to the above plan.      Mi Curiel [facility], MA 10/17/18

## 2018-10-26 DIAGNOSIS — E11.9 TYPE 2 DIABETES MELLITUS WITHOUT COMPLICATION (H): ICD-10-CM

## 2018-10-26 LAB
ANION GAP SERPL CALCULATED.3IONS-SCNC: 6 MMOL/L (ref 3–14)
BUN SERPL-MCNC: 11 MG/DL (ref 7–30)
CALCIUM SERPL-MCNC: 8.8 MG/DL (ref 8.5–10.1)
CHLORIDE SERPL-SCNC: 102 MMOL/L (ref 94–109)
CO2 SERPL-SCNC: 29 MMOL/L (ref 20–32)
CREAT SERPL-MCNC: 0.68 MG/DL (ref 0.52–1.04)
GFR SERPL CREATININE-BSD FRML MDRD: >90 ML/MIN/1.7M2
GLUCOSE SERPL-MCNC: 132 MG/DL (ref 70–99)
HBA1C MFR BLD: 6.3 % (ref 0–5.6)
POTASSIUM SERPL-SCNC: 4.6 MMOL/L (ref 3.4–5.3)
SODIUM SERPL-SCNC: 137 MMOL/L (ref 133–144)

## 2018-10-26 PROCEDURE — 80048 BASIC METABOLIC PNL TOTAL CA: CPT | Performed by: INTERNAL MEDICINE

## 2018-10-26 PROCEDURE — 36415 COLL VENOUS BLD VENIPUNCTURE: CPT | Performed by: INTERNAL MEDICINE

## 2018-10-26 PROCEDURE — 83036 HEMOGLOBIN GLYCOSYLATED A1C: CPT | Performed by: INTERNAL MEDICINE

## 2018-11-05 ENCOUNTER — OFFICE VISIT (OUTPATIENT)
Dept: INTERNAL MEDICINE | Facility: CLINIC | Age: 53
End: 2018-11-05
Attending: INTERNAL MEDICINE
Payer: COMMERCIAL

## 2018-11-05 VITALS
SYSTOLIC BLOOD PRESSURE: 134 MMHG | WEIGHT: 269.2 LBS | BODY MASS INDEX: 40.8 KG/M2 | HEART RATE: 74 BPM | HEIGHT: 68 IN | DIASTOLIC BLOOD PRESSURE: 87 MMHG

## 2018-11-05 DIAGNOSIS — Z15.09 BRCA GENE MUTATION POSITIVE: ICD-10-CM

## 2018-11-05 DIAGNOSIS — Z29.9 PREVENTIVE MEASURE: ICD-10-CM

## 2018-11-05 DIAGNOSIS — E11.9 TYPE 2 DIABETES MELLITUS WITHOUT COMPLICATION, WITHOUT LONG-TERM CURRENT USE OF INSULIN (H): Primary | ICD-10-CM

## 2018-11-05 DIAGNOSIS — Z15.01 BRCA GENE MUTATION POSITIVE: ICD-10-CM

## 2018-11-05 DIAGNOSIS — M72.2 PLANTAR FASCIITIS: ICD-10-CM

## 2018-11-05 DIAGNOSIS — Z23 NEED FOR VACCINATION: ICD-10-CM

## 2018-11-05 PROCEDURE — 90686 IIV4 VACC NO PRSV 0.5 ML IM: CPT | Mod: ZF

## 2018-11-05 PROCEDURE — G0008 ADMIN INFLUENZA VIRUS VAC: HCPCS | Mod: ZF

## 2018-11-05 PROCEDURE — 25000128 H RX IP 250 OP 636: Mod: ZF

## 2018-11-05 PROCEDURE — G0463 HOSPITAL OUTPT CLINIC VISIT: HCPCS | Mod: 25

## 2018-11-05 ASSESSMENT — ENCOUNTER SYMPTOMS
NERVOUS/ANXIOUS: 0
DIARRHEA: 0
DEPRESSION: 0
POLYDIPSIA: 0
CHILLS: 0
POLYPHAGIA: 0
PANIC: 0
SWOLLEN GLANDS: 0
ABDOMINAL PAIN: 0
SINUS CONGESTION: 0
FEVER: 0
BRUISES/BLEEDS EASILY: 0
CONSTIPATION: 0
FATIGUE: 0
DECREASED CONCENTRATION: 0
INSOMNIA: 0
COUGH: 0
ALTERED TEMPERATURE REGULATION: 0
DYSPNEA ON EXERTION: 0

## 2018-11-05 ASSESSMENT — ANXIETY QUESTIONNAIRES
3. WORRYING TOO MUCH ABOUT DIFFERENT THINGS: MORE THAN HALF THE DAYS
1. FEELING NERVOUS, ANXIOUS, OR ON EDGE: SEVERAL DAYS
2. NOT BEING ABLE TO STOP OR CONTROL WORRYING: NOT AT ALL
GAD7 TOTAL SCORE: 7
6. BECOMING EASILY ANNOYED OR IRRITABLE: SEVERAL DAYS
7. FEELING AFRAID AS IF SOMETHING AWFUL MIGHT HAPPEN: MORE THAN HALF THE DAYS
5. BEING SO RESTLESS THAT IT IS HARD TO SIT STILL: NOT AT ALL

## 2018-11-05 ASSESSMENT — PATIENT HEALTH QUESTIONNAIRE - PHQ9: 5. POOR APPETITE OR OVEREATING: SEVERAL DAYS

## 2018-11-05 NOTE — PROGRESS NOTES
"ERIKA Vivas is here for follow up on several issues. She reports that she has noticed improvement in her mood. She reports that she has been working on some things at home. She is also working on self-care.     Review of Systems     Constitutional:  Negative for fever, chills and fatigue.   HENT:  Negative for dry mouth and sinus congestion.    Respiratory:   Negative for cough and dyspnea on exertion.    Cardiovascular:  Negative for chest pain, dyspnea on exertion and edema.   Gastrointestinal:  Negative for abdominal pain, diarrhea and constipation.   Endo/Heme:  Negative for anemia, swollen glands and bruises/bleeds easily.   Psychiatric/Behavioral:  Negative for depression, decreased concentration, mood swings and panic attacks.    Endocrine:  Negative for altered temperature regulation, polyphagia, polydipsia, unwanted hair growth and change in facial hair.    Current Outpatient Prescriptions   Medication     aspirin 81 MG tablet     blood glucose monitoring (ACCU-CHEK COMPACT CARE KIT) meter device kit     blood glucose monitoring (ACCU-CHEK SMARTVIEW) test strip     blood glucose monitoring (DIANELYS CONTOUR NEXT) test strip     Blood Glucose Monitoring Suppl (Allclasses CONTOUR NEXT MONITOR) W/DEVICE KIT     Calcium-Vitamin D (CALCIUM + D PO)     Cetirizine HCl (ZYRTEC PO)     losartan (COZAAR) 100 MG tablet     metFORMIN (GLUCOPHAGE-XR) 500 MG 24 hr tablet     simvastatin (ZOCOR) 20 MG tablet     VITAMIN D, CHOLECALCIFEROL, PO     YUVAFEM 10 MCG TABS vaginal tablet     No current facility-administered medications for this visit.      Vitals:    11/05/18 1006 11/05/18 1008 11/05/18 1009 11/05/18 1010   BP: 136/86 132/88 135/87 134/87   Pulse: 72 74 72 74   Weight: 122.1 kg (269 lb 3.2 oz)      Height: 1.715 m (5' 7.5\")        Physical Exam   Constitutional: She is well-developed, well-nourished, and in no distress.   HENT:   Head: Normocephalic and atraumatic.   Eyes: Conjunctivae are normal.   Cardiovascular: " Normal rate and regular rhythm.    Pulmonary/Chest: Effort normal.   Musculoskeletal: She exhibits no edema.   Skin: Skin is warm and dry.   Psychiatric: Mood, memory, affect and judgment normal.   Vitals reviewed.    Assessment and Plan:  Sola was seen today for recheck.    Diagnoses and all orders for this visit:    Type 2 diabetes mellitus without complication, without long-term current use of insulin (H). Reviewed test results with patient. Discussed glycemic goals as well as screening for target organ damage. Patient was instructed to continue with lifestyle modifications.   -     Albumin Random Urine Quantitative with Creat Ratio  -     Hemoglobin A1c; Future  -     Basic Metabolic Panel; Future    Need for vaccination.   -     HC FLU VAC PRESRV FREE QUAD SPLIT VIR 3+YRS IM    Plantar fasciitis. Patient was referred to Podiatry for further evaluation and recommendations on management.   -     PODIATRY/FOOT & ANKLE SURGERY REFERRAL    Preventive measure  -     HIV Antigen Antibody Combo; Future    BRCA gene mutation positive  -     Cancel:   -     ; Future      Total time spent 15 minutes.  More than 50% of the time spent with Ms. Silverman on counseling / coordinating her care    Alda Santamaria MD

## 2018-11-05 NOTE — LETTER
11/5/2018     RE: Sola Silverman  [address] [place] [address]     Dear Colleague,    Thank you for referring your patient, Sola Silverman, to the [facility]  at [facility]. Please see a copy of my visit note below.    ERIKA Vivas is here for follow up on several issues. She reports that she has noticed improvement in her mood. She reports that she has been working on some things at home. She is also working on self-care.     Review of Systems     Constitutional:  Negative for fever, chills and fatigue.   HENT:  Negative for dry mouth and sinus congestion.    Respiratory:   Negative for cough and dyspnea on exertion.    Cardiovascular:  Negative for chest pain, dyspnea on exertion and edema.   Gastrointestinal:  Negative for abdominal pain, diarrhea and constipation.   Endo/Heme:  Negative for anemia, swollen glands and bruises/bleeds easily.   Psychiatric/Behavioral:  Negative for depression, decreased concentration, mood swings and panic attacks.    Endocrine:  Negative for altered temperature regulation, polyphagia, polydipsia, unwanted hair growth and change in facial hair.    Current Outpatient Prescriptions   Medication     aspirin 81 MG tablet     blood glucose monitoring (ACCU-CHEK COMPACT CARE KIT) meter device kit     blood glucose monitoring (ACCU-CHEK SMARTVIEW) test strip     blood glucose monitoring (DIANELYS CONTOUR NEXT) test strip     Blood Glucose Monitoring Suppl (DIANELYS CONTOUR NEXT MONITOR) W/DEVICE KIT     Calcium-Vitamin D (CALCIUM + D PO)     Cetirizine HCl (ZYRTEC PO)     losartan (COZAAR) 100 MG tablet     metFORMIN (GLUCOPHAGE-XR) 500 MG 24 hr tablet     simvastatin (ZOCOR) 20 MG tablet     VITAMIN D, CHOLECALCIFEROL, PO     YUVAFEM 10 MCG TABS vaginal tablet     No current facility-administered medications for this visit.      Vitals:    11/05/18 1006 11/05/18 1008 11/05/18 1009 11/05/18 1010   BP: 136/86 132/88  "135/87 134/87   Pulse: 72 74 72 74   Weight: 122.1 kg (269 lb 3.2 oz)      Height: 1.715 m (5' 7.5\")        Physical Exam   Constitutional: She is well-developed, well-nourished, and in no distress.   HENT:   Head: Normocephalic and atraumatic.   Eyes: Conjunctivae are normal.   Cardiovascular: Normal rate and regular rhythm.    Pulmonary/Chest: Effort normal.   Musculoskeletal: She exhibits no edema.   Skin: Skin is warm and dry.   Psychiatric: Mood, memory, affect and judgment normal.   Vitals reviewed.    Assessment and Plan:  Sola was seen today for recheck.    Diagnoses and all orders for this visit:    Type 2 diabetes mellitus without complication, without long-term current use of insulin (H). Reviewed test results with patient. Discussed glycemic goals as well as screening for target organ damage. Patient was instructed to continue with lifestyle modifications.   -     Albumin Random Urine Quantitative with Creat Ratio  -     Hemoglobin A1c; Future  -     Basic Metabolic Panel; Future    Need for vaccination.   -     HC FLU VAC PRESRV FREE QUAD SPLIT VIR 3+YRS IM    Plantar fasciitis. Patient was referred to Podiatry for further evaluation and recommendations on management.   -     PODIATRY/FOOT & ANKLE SURGERY REFERRAL    Preventive measure  -     HIV Antigen Antibody Combo; Future    BRCA gene mutation positive  -     Cancel:   -     ; Future      Total time spent 15 minutes.  More than 50% of the time spent with Ms. Silverman on counseling / coordinating her care    Alda Santamaria MD      "

## 2018-11-05 NOTE — MR AVS SNAPSHOT
After Visit Summary   11/5/2018    Sola Silverman    MRN: [id number]           Patient Information     Date Of Birth          1965        Visit Information        Provider Department      11/5/2018 10:00 AM Alda Santamaria MD [facility]         Today's Diagnoses     Type 2 diabetes mellitus without complication, without long-term current use of insulin (H)    -  1    Need for vaccination        Plantar fasciitis        Preventive measure        BRCA gene mutation positive           Follow-ups after your visit        Additional Services     PODIATRY/FOOT & ANKLE SURGERY REFERRAL       Your provider has referred you to: [facility]: [facility] [phone]   [email or web address]    Please be aware that coverage of these services is subject to the terms and limitations of your health insurance plan.  Call member services at your health plan with any benefit or coverage questions.      Please bring the following to your appointment:  >>   Any x-rays, CTs or MRIs which have been performed.  Contact the facility where they were done to arrange for  prior to your scheduled appointment.    >>   List of current medications   >>   This referral request   >>   Any documents/labs given to you for this referral                  Your next 10 appointments already scheduled     Nov 15, 2018  9:30 AM CST   Return Visit with Mi Curiel [facility] ([address] [address]   [phone]            Nov 26, 2018  5:00 PM CST   Return Visit with JOE Cali   [facility] ([address] [address]   [phone]              Future tests that were ordered for you today     Open Future Orders        Priority Expected Expires Ordered    HIV Antigen Antibody Combo Routine  3/5/2019 11/5/2018     "Hemoglobin A1c Routine  3/5/2019 11/5/2018    Basic Metabolic Panel Routine  3/5/2019 11/5/2018            Who to contact     Please call your clinic at [phone] to:    Ask questions about your health    Make or cancel appointments    Discuss your medicines    Learn about your test results    Speak to your doctor            Additional Information About Your Visit        [facility] Information     [employer] gives you secure access to your electronic health record. If you see a primary care provider, you can also send messages to your care team and make appointments. If you have questions, please call your primary care clinic.  If you do not have a primary care provider, please call [phone] and they will assist you.      [employer] is an electronic gateway that provides easy, online access to your medical records. With [employer], you can request a clinic appointment, read your test results, renew a prescription or communicate with your care team.     To access your existing account, please contact your [facility] or call [phone] for assistance.        Care EveryWhere ID     This is your Care EveryWhere ID. This could be used by other organizations to access your [place] medical records  [id number]        Your Vitals Were     Pulse Height Breastfeeding? BMI (Body Mass Index)          74 1.715 m (5' 7.5\") No 41.54 kg/m2         Blood Pressure from Last 3 Encounters:   11/05/18 134/87   07/31/18 136/85   04/25/18 133/89    Weight from Last 3 Encounters:   11/05/18 122.1 kg (269 lb 3.2 oz)   07/31/18 120.7 kg (266 lb)   04/25/18 119.7 kg (264 lb)              We Performed the Following     Albumin Random Urine Quantitative with Creat Ratio          HC FLU VAC PRESRV FREE QUAD SPLIT VIR 3+YRS IM     PODIATRY/FOOT & ANKLE SURGERY REFERRAL        Primary Care Provider Office Phone # Fax #    Alda Santamaria [phone]       [facility] [phone]  [facility] [id number]        Equal Access to Services     RAFAEL HAYS : Hadii elie corley denisha Qureshi, waaxda luqadaha, qaybta kamilviaanibal little, waxay idiin haypatgen rayclaudettedanyell milan. So [facility] [phone].    ATENCIÓN: Si habla español, tiene a schuster disposición servicios gratuitos de asistencia lingüística. Jeetame al [phone].    We comply with applicable federal civil rights laws and [place] laws. We do not discriminate on the basis of race, color, national origin, age, disability, sex, sexual orientation, or gender identity.            Thank you!     Thank you for choosing [facility]   for your care. Our goal is always to provide you with excellent care. Hearing back from our patients is one way we can continue to improve our services. Please take a few minutes to complete the written survey that you may receive in the mail after your visit with us. Thank you!             Your Updated Medication List - Protect others around you: Learn how to safely use, store and throw away your medicines at www.disposemymeds.org.          This list is accurate as of 11/5/18 10:42 AM.  Always use your most recent med list.                   Brand Name Dispense Instructions for use Diagnosis    aspirin 81 MG tablet      Take 1 tablet by mouth daily.        * blood glucose monitoring meter device kit      As directed    Fall, initial encounter       * blood glucose monitoring meter device kit     1 kit    Test 4 times daily.  May dispense whatever type of brand is covered by patients insurance.    Haemoglobin A1c above reference range       * blood glucose monitoring test strip    DIANELYS CONTOUR NEXT    150 strip    1 strip by In Vitro route 2 times daily As directed.        * blood glucose monitoring test strip    ACCU-CHEK SMARTVIEW    100 strip    TEST FOUR TIMES DAILY AS DIRECTED    Type 2 diabetes mellitus without complication, unspecified long term insulin use status       CALCIUM + D PO       Take 1 tablet by mouth daily.        losartan 100 MG tablet    COZAAR    90 tablet    Take 1 tablet (100 mg) by mouth daily    Hyperlipidemia type II       metFORMIN 500 MG 24 hr tablet    GLUCOPHAGE-XR    360 tablet    TAKE 2 TABLETS(1000 MG) BY MOUTH TWICE DAILY WITH MEALS    Type 2 diabetes mellitus without complication, unspecified long term insulin use status       simvastatin 20 MG tablet    ZOCOR    90 tablet    TAKE 1 TABLET(20 MG) BY MOUTH DAILY    Hyperlipidemia type II       VITAMIN D (CHOLECALCIFEROL) PO      Take 2,000 Units by mouth daily        YUVAFEM 10 MCG Tabs vaginal tablet   Generic drug:  estradiol     24 tablet    INSERT 1 TABLET VAGINALLY  TWICE WEEKLY    Atrophic vaginitis       ZYRTEC PO      Take 1 tablet by mouth as needed.        * Notice:  This list has 4 medication(s) that are the same as other medications prescribed for you. Read the directions carefully, and ask your doctor or other care provider to review them with you.

## 2018-11-06 ASSESSMENT — ANXIETY QUESTIONNAIRES: GAD7 TOTAL SCORE: 7

## 2018-11-15 ENCOUNTER — OFFICE VISIT (OUTPATIENT)
Dept: PSYCHOLOGY | Facility: CLINIC | Age: 53
End: 2018-11-15
Payer: COMMERCIAL

## 2018-11-15 DIAGNOSIS — F43.23 ADJUSTMENT DISORDER WITH MIXED ANXIETY AND DEPRESSED MOOD: Primary | ICD-10-CM

## 2018-11-15 PROCEDURE — 90834 PSYTX W PT 45 MINUTES: CPT | Performed by: COUNSELOR

## 2018-11-15 NOTE — PROGRESS NOTES
Progress Note    Client Name: Sola Silverman  Date: 11/15/18         Service Type: Individual      Session Start Time: 9:30 am  Session End Time: 10:20 am      Session Length: 50 min     Session #: 11     Attendees: Client attended alone    Treatment Plan Last Reviewed: 8/2/18 (Review by 11/2/18)  PHQ-9 / HERMINIO-7 :---  DATA      Progress Since Last Session (Related to Symptoms / Goals / Homework):   Symptoms: No change      Homework: Achieved / completed to satisfaction       Episode of Care Goals: Satisfactory progress - ACTION (Actively working towards change); Intervened by reinforcing change plan / affirming steps taken     Current / Ongoing Stressors and Concerns:   Ongoing: Work stress, grief around loss of father and disconnection from siblings, limited support system   Current: Client discussed continued work stressors and deciding if she should apply to other jobs.      Treatment Objective(s) Addressed in This Session:   identity initial signs of anxiety   Client will identify steps toward managing grief.       Intervention:   DBT: Coached on 4-part Pros and Cons  EMDR: Discussed basic concepts of EMDR and the posibility of referral to this treatment modality in the future to assist with complex grief.  Motivational Interviewing: Reflected and reinforced change language.        ASSESSMENT: Current Emotional / Mental Status (status of significant symptoms):   Risk status (Self / Other harm or suicidal ideation)   Client denies current fears or concerns for personal safety.   Client denies current or recent suicidal ideation or behaviors.   Client denies current or recent homicidal ideation or behaviors.   Client denies current or recent self injurious behavior or ideation.   Client denies other safety concerns.   Client Client reports there has been no change in risk factors since their last session.     Client Client reports there has been no change in  protective factors since their last session.     A safety and risk management plan has not been developed at this time, however client was given the after-hours number / 911 should there be a change in any of these risk factors.     Appearance:   Appropriate    Eye Contact:   Good    Psychomotor Behavior: Normal    Attitude:   Cooperative    Orientation:   All   Speech    Rate / Production: Normal     Volume:  Normal    Mood:    Normal   Affect:    Appropriate    Thought Content:  Clear    Thought Form:  Coherent  Logical    Insight:    Good      Medication Review:   No current psychiatric medications prescribed     Medication Compliance:   NA     Changes in Health Issues:   None reported     Chemical Use Review:   Substance Use: Chemical use reviewed, no active concerns identified      Tobacco Use: No current tobacco use.       Collateral Reports Completed:   Not Applicable    PLAN: (Client Tasks / Therapist Tasks / Other)  No homework assigned. Follow-up scheduled.        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    Anticipated number of session or this episode of care: 30      MeasurableTreatment Goal(s) related to diagnosis / functional impairment(s)  Goal 1: Client will increase understanding and engagement in the grief process.    Objective #A (Client Action)    Client will increase understanding of steps in the grief process.  Status: New - Date: 8/2/18     Intervention(s)  Therapist will  assign homework    provide educational materials on grief process.    Objective #B  Client will develop vocabulary to describe feelings of grief and loss.  Status: New - Date: 8/2/18     Intervention(s)  Therapist will assign homework    teach emotional recognition/identification.      Objective #C  Client will identify steps toward managing grief.  Status: New - Date: 8/2/18     Intervention(s)  Therapist will assign homework    teach Mindfulness, Distress Tolerance, and Emotion Regulation skills.      Goal 2: Client will decrease anxiety symptoms, and increase engaging in stress management techniques as evidence by HERMINIO-7 scores.    Objective #A (Client Action)    Status: New - Date: 8/2/18     Client will engage in a physical activity 3 times weekly for 15 minutes.    Intervention(s)  Therapist will assign homework    teach emotional regulation skills.      Objective #B  Client will use at least 3 coping skills for anxiety management in the next 3 weeks.    Status: New - Date: 8/2/18     Intervention(s)  Therapist will assign homework    teach Distress Tolerance techniques.    Objective #C  Client will identify 3 initial signs or symptoms of anxiety.  Status: New - Date: 8/2/18     Intervention(s)  Therapist will assign homework    teach emotional recognition/identification.           Client has reviewed and agreed to the above plan.      Mi Curiel [facility], MA 11/15/18

## 2018-11-15 NOTE — MR AVS SNAPSHOT
MRN:[id number]                      After Visit Summary   11/15/2018    Sola Silverman    MRN: [id number]           Visit Information        Provider Department      11/15/2018 9:30 AM Mi Curiel [facility]      Your next 10 appointments already scheduled     Nov 16, 2018  9:40 AM CST   New Visit with Sidney Leary DPM   [facility]    [address] [address]   [phone]            Nov 26, 2018  5:00 PM CST   Return Visit with Mi Curiel formerly [facility] ([facility])    [address] [address]   [phone]            Dec 05, 2018  4:00 PM CST   Return Visit with Mi Curiel formerly [facility] ([facility])    [address] [address]   [phone]            Dec 17, 2018  5:00 PM CST   Return Visit with Mi Curiel formerly [facility] ([facility])    [address] [address]   [phone]            Feb 12, 2019 10:00 AM CST   Return Visit with Alda Santamaria MD   [facility]  ([facility])    [address]  [address]  [address] [id number] [phone]              MyChart Information     [facility] gives you secure access to your electronic health record. If you see a primary care provider, you can also send messages to your care team and make appointments. If you have questions, please call your primary care clinic.  If you do not have a primary care provider, please call [phone] and they will assist you.        Care EveryWhere ID     This is your Care EveryWhere ID. This could be used by other organizations to access your [facility] medical records  [id number]        Equal Access to Services     RAFAEL WOOD: Twan delgadillo  barney Qureshi, sumit verduzco, willam little, jessica milan. [facility] [phone].    ATENCIÓN: Si habla español, tiene a schuster disposición servicios gratuitos de asistencia lingüística. Llame al [phone].    We comply with applicable federal civil rights laws and [place] laws. We do not discriminate on the basis of race, color, national origin, age, disability, sex, sexual orientation, or gender identity.

## 2018-11-16 ENCOUNTER — OFFICE VISIT (OUTPATIENT)
Dept: PODIATRY | Facility: CLINIC | Age: 53
End: 2018-11-16
Payer: COMMERCIAL

## 2018-11-16 VITALS — HEIGHT: 68 IN | RESPIRATION RATE: 16 BRPM | BODY MASS INDEX: 40.77 KG/M2 | WEIGHT: 269 LBS

## 2018-11-16 DIAGNOSIS — M72.2 PLANTAR FASCIITIS, LEFT: Primary | ICD-10-CM

## 2018-11-16 DIAGNOSIS — G57.92 NEURITIS OF LEFT FOOT: ICD-10-CM

## 2018-11-16 PROCEDURE — 99203 OFFICE O/P NEW LOW 30 MIN: CPT | Performed by: PODIATRIST

## 2018-11-16 NOTE — LETTER
11/16/2018         RE: Sola Silverman  [address] [place] [address]        Dear Colleague,    Thank you for referring your patient, Sola Silverman, to the [facility]. Please see a copy of my visit note below.    PATIENT HISTORY:  Sola Silverman is a 53 year old female who presents to clinic for a painful left foot .  The patient describes the pain as sharp stabbing.  The patient relates the pain level is moderate.  The patient relates pain is located on the plantar aspect of the left heel.  The patient relates the pain has been present for the past several months.  The patient relates pain with ambulation.  The patient has tried stretching and support with little relief.  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    REVIEW OF SYSTEMS:  Constitutional, HEENT, cardiovascular, pulmonary, GI, , musculoskeletal, neuro, skin, endocrine and psych systems are negative, except as otherwise noted.     PAST MEDICAL HISTORY:   Past Medical History:   Diagnosis Date     Allergy      Ankle joint pain      BRCA2 positive      Depression      Diabetes (H)      Hyperlipidemia      Hypertension      Lumbago      Morbid obesity (H)         PAST SURGICAL HISTORY:   Past Surgical History:   Procedure Laterality Date     ABDOMEN SURGERY       EXCISE MASS LOWER EXTREMITY  4/11/2011    Procedure:EXCISE MASS LOWER EXTREMITY; Wound Mass; Surgeon:DREW LAURA; Location:[facility] OR     FOOT SURGERY  2000    left     KNEE SURGERY  1986    left     SALPINGO-OOPHORECTOMY BILATERAL          MEDICATIONS:   Current Outpatient Prescriptions:      aspirin 81 MG tablet, Take 1 tablet by mouth daily., Disp: , Rfl:      blood glucose monitoring (ACCU-CHEK  COMPACT CARE KIT) meter device kit, Test 4 times daily.  May dispense whatever type of brand is covered by patients insurance., Disp: 1 kit, Rfl: 0     blood glucose monitoring (ACCU-CHEK SMARTVIEW) test strip, TEST FOUR TIMES DAILY AS DIRECTED, Disp: 100 strip, Rfl: 3     blood glucose monitoring (DIANELYS CONTOUR NEXT) test strip, 1 strip by In Vitro route 2 times daily As directed., Disp: 150 strip, Rfl: 5     Blood Glucose Monitoring Suppl (DIANELYS CONTOUR NEXT MONITOR) W/DEVICE KIT, As directed, Disp: , Rfl:      Calcium-Vitamin D (CALCIUM + D PO), Take 1 tablet by mouth daily., Disp: , Rfl:      Cetirizine HCl (ZYRTEC PO), Take 1 tablet by mouth as needed., Disp: , Rfl:      losartan (COZAAR) 100 MG tablet, Take 1 tablet (100 mg) by mouth daily, Disp: 90 tablet, Rfl: 3     metFORMIN (GLUCOPHAGE-XR) 500 MG 24 hr tablet, TAKE 2 TABLETS(1000 MG) BY MOUTH TWICE DAILY WITH MEALS, Disp: 360 tablet, Rfl: 3     simvastatin (ZOCOR) 20 MG tablet, TAKE 1 TABLET(20 MG) BY MOUTH DAILY, Disp: 90 tablet, Rfl: 3     VITAMIN D, CHOLECALCIFEROL, PO, Take 2,000 Units by mouth daily, Disp: , Rfl:      YUVAFEM 10 MCG TABS vaginal tablet, INSERT 1 TABLET VAGINALLY  TWICE WEEKLY, Disp: 24 tablet, Rfl: 3     ALLERGIES:    Allergies   Allergen Reactions     Seasonal Allergies      Latex Itching, Swelling and Rash        SOCIAL HISTORY:   Social History     Social History     Marital status: Single     Spouse name: N/A     Number of children: N/A     Years of education: N/A     Occupational History     Not on file.     Social History Main Topics     Smoking status: Never Smoker     Smokeless tobacco: Never Used     Alcohol use Yes      Comment: rare     Drug use: No     Sexual activity: No     Other Topics Concern     Not on file     Social History [facility] [employer] [facility]   Medicine Researcher, no tobacco, occasional alcohol on holidays.  Single, no children.        FAMILY HISTORY:   Family History   Problem Relation Age of  "Onset     C.A.D. Other      Diabetes Other      Diabetes Other      Cancer Other      Ovary     Depression Mother      Depression Father      Nystagmus No family hx of      Anxiety Disorder No family hx of      Obesity No family hx of      Breast Cancer No family hx of      Hyperlipidemia No family hx of         EXAM:Vitals: Resp 16  Ht 5' 7.5\" (1.715 m)  Wt 269 lb (122 kg)  BMI 41.51 kg/m2  BMI= Body mass index is 41.51 kg/(m^2).    Weight management plan: Patient was referred to their PCP to discuss a diet and exercise plan.    General appearance: Patient is alert and fully cooperative with history & exam.  No sign of distress is noted during the visit.     Psychiatric: Affect is pleasant & appropriate.  Patient appears motivated to improve health.     Respiratory: Breathing is regular & unlabored while sitting.     HEENT: Hearing is intact to spoken word.  Speech is clear.  No gross evidence of visual impairment that would impact ambulation.     Dermatologic: Skin is intact to both lower extremities without significant lesions, rash or abrasion.  No paronychia or evidence of soft tissue infection is noted.     Vascular: DP & PT pulses are intact & regular bilaterally.  No significant edema or varicosities noted.  CFT and skin temperature is normal to both lower extremities.     Neurologic: Lower extremity sensation is intact to light touch.  No evidence of weakness or contracture in the lower extremities.  No evidence of neuropathy.     Musculoskeletal: Patient is ambulatory without assistive device or brace.  No gross ankle deformity noted.  No foot or ankle joint effusion is noted.  Noted pain on palpation of the plantar aspect of the left heel at the insertion point of the plantar fascia.  No surrounding erythema noted.  One notes subluxation of the peroneal tendons over the lateral malleolus on the left ankle.  No surrounding erythema or edema noted.  One notes loss of tactile sensation over the dorsal " lateral aspect of the left foot.       ASSESSMENT / PLAN:     ICD-10-CM    1. Plantar fasciitis, left M72.2    2. Subluxation of peroneal tendon, left, sequela S93.05XS    3. Neuritis of left foot G57.92        I have explained to Sola  about the conditions.  We discussed the nature of the condition as well as the treatment plan and expected length of recovery.  At this time, the patient was instructed on icing, stretching, tissue massage and support.  The patient was referred to [facility] for custom orthotics that will aid in offloading the tension forces to the soft tissues and prevent further inflammation.   The patient will return in four weeks for reevaluation if the symptoms do not resolve.        Disclaimer: This note consists of symbols derived from keyboarding, dictation and/or voice recognition software. As a result, there may be errors in the script that have gone undetected. Please consider this when interpreting information found in this chart.       VIDYA Pickard.P.M., F.A.C.F.A.S.        Again, thank you for allowing me to participate in the care of your patient.        Sincerely,        Sidney Leary DPM

## 2018-11-16 NOTE — PROGRESS NOTES
PATIENT HISTORY:  Sola Silverman is a 53 year old female who presents to clinic for a painful left foot .  The patient describes the pain as sharp stabbing.  The patient relates the pain level is moderate.  The patient relates pain is located on the plantar aspect of the left heel.  The patient relates the pain has been present for the past several months.  The patient relates pain with ambulation.  The patient has tried stretching and support with little relief.  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    REVIEW OF SYSTEMS:  Constitutional, HEENT, cardiovascular, pulmonary, GI, , musculoskeletal, neuro, skin, endocrine and psych systems are negative, except as otherwise noted.     PAST MEDICAL HISTORY:   Past Medical History:   Diagnosis Date     Allergy      Ankle joint pain      BRCA2 positive      Depression      Diabetes (H)      Hyperlipidemia      Hypertension      Lumbago      Morbid obesity (H)         PAST SURGICAL HISTORY:   Past Surgical History:   Procedure Laterality Date     ABDOMEN SURGERY       EXCISE MASS LOWER EXTREMITY  4/11/2011    Procedure:EXCISE MASS LOWER EXTREMITY; Wound Mass; Surgeon:DREW LAURA; Location:[facility]     FOOT SURGERY  2000    left     KNEE SURGERY  1986    left     SALPINGO-OOPHORECTOMY BILATERAL          MEDICATIONS:   Current Outpatient Prescriptions:      aspirin 81 MG tablet, Take 1 tablet by mouth daily., Disp: , Rfl:      blood glucose monitoring (ACCU-CHEK COMPACT CARE KIT) meter device kit, Test 4 times daily.  May dispense whatever type of brand is covered by patients insurance., Disp: 1 kit, Rfl: 0     blood glucose monitoring (ACCU-CHEK SMARTVIEW) test strip, TEST FOUR TIMES DAILY AS DIRECTED, Disp:  100 strip, Rfl: 3     blood glucose monitoring (DIANELYS CONTOUR NEXT) test strip, 1 strip by In Vitro route 2 times daily As directed., Disp: 150 strip, Rfl: 5     Blood Glucose Monitoring Suppl (DIANELYS CONTOUR NEXT MONITOR) W/DEVICE KIT, As directed, Disp: , Rfl:      Calcium-Vitamin D (CALCIUM + D PO), Take 1 tablet by mouth daily., Disp: , Rfl:      Cetirizine HCl (ZYRTEC PO), Take 1 tablet by mouth as needed., Disp: , Rfl:      losartan (COZAAR) 100 MG tablet, Take 1 tablet (100 mg) by mouth daily, Disp: 90 tablet, Rfl: 3     metFORMIN (GLUCOPHAGE-XR) 500 MG 24 hr tablet, TAKE 2 TABLETS(1000 MG) BY MOUTH TWICE DAILY WITH MEALS, Disp: 360 tablet, Rfl: 3     simvastatin (ZOCOR) 20 MG tablet, TAKE 1 TABLET(20 MG) BY MOUTH DAILY, Disp: 90 tablet, Rfl: 3     VITAMIN D, CHOLECALCIFEROL, PO, Take 2,000 Units by mouth daily, Disp: , Rfl:      YUVAFEM 10 MCG TABS vaginal tablet, INSERT 1 TABLET VAGINALLY  TWICE WEEKLY, Disp: 24 tablet, Rfl: 3     ALLERGIES:    Allergies   Allergen Reactions     Seasonal Allergies      Latex Itching, Swelling and Rash        SOCIAL HISTORY:   Social History     Social History     Marital status: Single     Spouse name: N/A     Number of children: N/A     Years of education: N/A     Occupational History     Not on file.     Social History Main Topics     Smoking status: Never Smoker     Smokeless tobacco: Never Used     Alcohol use Yes      Comment: rare     Drug use: No     Sexual activity: No     Other Topics Concern     Not on file     Social History [employer] [facility]   [employer] Researcher, no tobacco, occasional alcohol on holidays.  Single, no children.        FAMILY HISTORY:   Family History   Problem Relation Age of Onset     C.A.D. Other      Diabetes Other      Diabetes Other      Cancer Other      Ovary     Depression Mother      Depression Father      Nystagmus No family hx of      Anxiety Disorder No family hx of      Obesity No family hx of      Breast Cancer No  "family hx of      Hyperlipidemia No family hx of         EXAM:Vitals: Resp 16  Ht 5' 7.5\" (1.715 m)  Wt 269 lb (122 kg)  BMI 41.51 kg/m2  BMI= Body mass index is 41.51 kg/(m^2).    Weight management plan: Patient was referred to their PCP to discuss a diet and exercise plan.    General appearance: Patient is alert and fully cooperative with history & exam.  No sign of distress is noted during the visit.     Psychiatric: Affect is pleasant & appropriate.  Patient appears motivated to improve health.     Respiratory: Breathing is regular & unlabored while sitting.     HEENT: Hearing is intact to spoken word.  Speech is clear.  No gross evidence of visual impairment that would impact ambulation.     Dermatologic: Skin is intact to both lower extremities without significant lesions, rash or abrasion.  No paronychia or evidence of soft tissue infection is noted.     Vascular: DP & PT pulses are intact & regular bilaterally.  No significant edema or varicosities noted.  CFT and skin temperature is normal to both lower extremities.     Neurologic: Lower extremity sensation is intact to light touch.  No evidence of weakness or contracture in the lower extremities.  No evidence of neuropathy.     Musculoskeletal: Patient is ambulatory without assistive device or brace.  No gross ankle deformity noted.  No foot or ankle joint effusion is noted.  Noted pain on palpation of the plantar aspect of the left heel at the insertion point of the plantar fascia.  No surrounding erythema noted.  One notes subluxation of the peroneal tendons over the lateral malleolus on the left ankle.  No surrounding erythema or edema noted.  One notes loss of tactile sensation over the dorsal lateral aspect of the left foot.       ASSESSMENT / PLAN:     ICD-10-CM    1. Plantar fasciitis, left M72.2    2. Subluxation of peroneal tendon, left, sequela S93.05XS    3. Neuritis of left foot G57.92        I have explained to Sola  about the conditions. "  We discussed the nature of the condition as well as the treatment plan and expected length of recovery.  At this time, the patient was instructed on icing, stretching, tissue massage and support.  The patient was referred to [facility] for custom orthotics that will aid in offloading the tension forces to the soft tissues and prevent further inflammation.   The patient will return in four weeks for reevaluation if the symptoms do not resolve.        Disclaimer: This note consists of symbols derived from keyboarding, dictation and/or voice recognition software. As a result, there may be errors in the script that have gone undetected. Please consider this when interpreting information found in this chart.       YAKOV Leary D.P.M., F.AMBIKA.C.F.A.S.

## 2018-11-16 NOTE — NURSING NOTE
"Chief Complaint   Patient presents with     Consult     left foot pain mostly in the heel that moves towards the arch with burning feeling in the toes       Initial Resp 16  Ht 5' 7.5\" (1.715 m)  Wt 269 lb (122 kg)  BMI 41.51 kg/m2 Estimated body mass index is 41.51 kg/(m^2) as calculated from the following:    Height as of this encounter: 5' 7.5\" (1.715 m).    Weight as of this encounter: 269 lb (122 kg).  Medications and allergies reviewed.    Angela DASILVA, BÁRBARA    "

## 2018-11-16 NOTE — MR AVS SNAPSHOT
After Visit Summary   11/16/2018    Sola Silverman    MRN: [id number]           Patient Information     Date Of Birth          1965        Visit Information        Provider Department      11/16/2018 9:40 AM Sidney Leary DPM [facility]        Today's Diagnoses     Plantar fasciitis, left    -  1    Subluxation of peroneal tendon, left, sequela        Neuritis of left foot          Care Instructions    Initial musculoskeletal treatment recommendation:    1.  Wear supportive foot wear (stiff soles) and/or arch supports (rigid not cushion).  2.  Stretch the calf muscles as instructed once an hour.  3.  Massage the soft tissues around the injured area in the morning to loosen the tissue with an over the counter product like Icy Hot with Lidocaine  4.  Ice the injured area in the evening; 20 min on/off.  5. Take antiinflammatory medication as indicated.    If no improvement in symptoms within four to six weeks, return to clinic for reevaluation.            Follow-ups after your visit        Additional Services     ORTHOTICS REFERRAL       Please be aware that coverage of these services is subject to the terms and limitations of your health insurance plan.  Call member services at your health plan with any benefit or coverage questions.      Please bring the following to your appointment:    >>   Any x-rays, CTs or MRIs which have been performed.  Contact the facility where they were done to arrange for  prior to your scheduled appointment.  Any new CT, MRI or other procedures ordered by your specialist must be performed at a [facility] facility or coordinated by your clinic's referral office.    >>   List of current medications   >>   This referral request   >>   Any documents/labs given to you for this referral    ==This Referral PRINTS in the [facility] (ORTHOTICS & PROSTHETICS) Central scheduling office ==     The [facility] Central Scheduling  staff will contact patient to arrange appointments. Central Scheduling Phone #:  SVEN Quinones  [phone]     Orthotics: Foot Orthotics                  Follow-up notes from your care team     Return in about 4 weeks (around 12/14/2018), or if symptoms worsen or fail to improve.      Your next 10 appointments already scheduled     Nov 26, 2018  5:00 PM CST   Return Visit with Mi Curiel [facility] ([facility])    [address] [facility] [address]   [phone]            Dec 05, 2018  4:00 PM CST   Return Visit with Mi Curiel [facility] ([facility])    [address] [facility] [address]   [phone]            Dec 17, 2018  5:00 PM CST   Return Visit with Mi Curiel [facility] ([facility])    [address] [facility] [address]   [phone]            Feb 12, 2019 10:00 AM CST   Return Visit with Alda Santamaria MD   [facility]  ([facility])    [address]  [address]  [address] [id number] [phone]              Who to contact     If you have questions or need follow up information about today's clinic visit or your schedule please contact [facility] directly at [phone].  Normal or non-critical lab and imaging results will be communicated to you by MyChart, letter or phone within 4 business days after the clinic has received the results. If you do not hear from us within 7 days, please contact the clinic through MyChart or phone. If you have a critical or abnormal lab result, we will notify you by phone as soon as possible.  Submit refill requests through [employer] or call your pharmacy and they will forward the refill request to us. Please allow 3 business days for your refill to be completed.          Additional Information About Your Visit    "     MyChart Information     [employer] gives you secure access to your electronic health record. If you see a primary care provider, you can also send messages to your care team and make appointments. If you have questions, please call your primary care clinic.  If you do not have a primary care provider, please call [phone] and they will assist you.        Care EveryWhere ID     This is your Care EveryWhere ID. This could be used by other organizations to access your [facility] medical records  [id number]        Your Vitals Were     Respirations Height BMI (Body Mass Index)             16 5' 7.5\" (1.715 m) 41.51 kg/m2          Blood Pressure from Last 3 Encounters:   11/05/18 134/87   07/31/18 136/85   04/25/18 133/89    Weight from Last 3 Encounters:   11/16/18 269 lb (122 kg)   11/05/18 269 lb 3.2 oz (122.1 kg)   07/31/18 266 lb (120.7 kg)              We Performed the Following     ORTHOTICS REFERRAL        Primary Care Provider Office Phone # Fax #    Alda Paz Santamaria [phone]       [facility] [address]  [facility] [address]        Equal Access to Services     ALMA HAYS AH: Hadii elie de la pazo Sojadiel, waaxda luqadaha, qaybta kaalmada adeegyada, jessica milan. So [facility] [phone].    ATENCIÓN: Si habla español, tiene a schuster disposición servicios gratuitos de asistencia lingüística. Rosanna al [phone].    We comply with applicable federal civil rights laws and [place] laws. We do not discriminate on the basis of race, color, national origin, age, disability, sex, sexual orientation, or gender identity.            Thank you!     Thank you for choosing [facility]  for your care. Our goal is always to provide you with excellent care. Hearing back from our patients is one way we can continue to improve our services. Please take a few minutes to complete the written survey that you may receive in the mail after your " visit with us. Thank you!             Your Updated Medication List - Protect others around you: Learn how to safely use, store and throw away your medicines at www.disposemymeds.org.          This list is accurate as of 11/16/18 10:03 AM.  Always use your most recent med list.                   Brand Name Dispense Instructions for use Diagnosis    aspirin 81 MG tablet      Take 1 tablet by mouth daily.        * blood glucose monitoring meter device kit      As directed    Fall, initial encounter       * blood glucose monitoring meter device kit     1 kit    Test 4 times daily.  May dispense whatever type of brand is covered by patients insurance.    Haemoglobin A1c above reference range       * blood glucose monitoring test strip    DIANELYS CONTOUR NEXT    150 strip    1 strip by In Vitro route 2 times daily As directed.        * blood glucose monitoring test strip    ACCU-CHEK SMARTVIEW    100 strip    TEST FOUR TIMES DAILY AS DIRECTED    Type 2 diabetes mellitus without complication, unspecified long term insulin use status       CALCIUM + D PO      Take 1 tablet by mouth daily.        losartan 100 MG tablet    COZAAR    90 tablet    Take 1 tablet (100 mg) by mouth daily    Hyperlipidemia type II       metFORMIN 500 MG 24 hr tablet    GLUCOPHAGE-XR    360 tablet    TAKE 2 TABLETS(1000 MG) BY MOUTH TWICE DAILY WITH MEALS    Type 2 diabetes mellitus without complication, unspecified long term insulin use status       simvastatin 20 MG tablet    ZOCOR    90 tablet    TAKE 1 TABLET(20 MG) BY MOUTH DAILY    Hyperlipidemia type II       VITAMIN D (CHOLECALCIFEROL) PO      Take 2,000 Units by mouth daily        YUVAFEM 10 MCG Tabs vaginal tablet   Generic drug:  estradiol     24 tablet    INSERT 1 TABLET VAGINALLY  TWICE WEEKLY    Atrophic vaginitis       ZYRTEC PO      Take 1 tablet by mouth as needed.        * Notice:  This list has 4 medication(s) that are the same as other medications prescribed for you. Read the  directions carefully, and ask your doctor or other care provider to review them with you.

## 2018-11-26 ENCOUNTER — OFFICE VISIT (OUTPATIENT)
Dept: PSYCHOLOGY | Facility: CLINIC | Age: 53
End: 2018-11-26
Payer: COMMERCIAL

## 2018-11-26 DIAGNOSIS — F43.23 ADJUSTMENT DISORDER WITH MIXED ANXIETY AND DEPRESSED MOOD: Primary | ICD-10-CM

## 2018-11-26 PROCEDURE — 90834 PSYTX W PT 45 MINUTES: CPT | Performed by: COUNSELOR

## 2018-12-05 ENCOUNTER — OFFICE VISIT (OUTPATIENT)
Dept: PSYCHOLOGY | Facility: CLINIC | Age: 53
End: 2018-12-05
Payer: COMMERCIAL

## 2018-12-05 DIAGNOSIS — F43.23 ADJUSTMENT DISORDER WITH MIXED ANXIETY AND DEPRESSED MOOD: Primary | ICD-10-CM

## 2018-12-05 PROCEDURE — 90837 PSYTX W PT 60 MINUTES: CPT | Performed by: COUNSELOR

## 2018-12-05 ASSESSMENT — ANXIETY QUESTIONNAIRES
6. BECOMING EASILY ANNOYED OR IRRITABLE: MORE THAN HALF THE DAYS
GAD7 TOTAL SCORE: 9
7. FEELING AFRAID AS IF SOMETHING AWFUL MIGHT HAPPEN: MORE THAN HALF THE DAYS
IF YOU CHECKED OFF ANY PROBLEMS ON THIS QUESTIONNAIRE, HOW DIFFICULT HAVE THESE PROBLEMS MADE IT FOR YOU TO DO YOUR WORK, TAKE CARE OF THINGS AT HOME, OR GET ALONG WITH OTHER PEOPLE: SOMEWHAT DIFFICULT
5. BEING SO RESTLESS THAT IT IS HARD TO SIT STILL: SEVERAL DAYS
3. WORRYING TOO MUCH ABOUT DIFFERENT THINGS: SEVERAL DAYS
2. NOT BEING ABLE TO STOP OR CONTROL WORRYING: SEVERAL DAYS
1. FEELING NERVOUS, ANXIOUS, OR ON EDGE: SEVERAL DAYS

## 2018-12-05 ASSESSMENT — PATIENT HEALTH QUESTIONNAIRE - PHQ9
5. POOR APPETITE OR OVEREATING: SEVERAL DAYS
SUM OF ALL RESPONSES TO PHQ QUESTIONS 1-9: 8

## 2018-12-05 NOTE — PROGRESS NOTES
Progress Note    Client Name: Sola Silverman  Date: 12/5/18         Service Type: Individual      Session Start Time: 4:00 pm  Session End Time: 4:57 pm      Session Length: 57 min     Session #: 13     Attendees: Client attended alone    Treatment Plan Last Reviewed: 8/2/18 (Review by 11/2/18)  PHQ-9 / HERMINIO-7 :  DATA      Progress Since Last Session (Related to Symptoms / Goals / Homework):   Symptoms: No change  - Client reports decreased distress levels when discussing father since beginning of this episode of care.    Homework: Achieved / completed to satisfaction       Episode of Care Goals: Satisfactory progress - ACTION (Actively working towards change); Intervened by reinforcing change plan / affirming steps taken     Current / Ongoing Stressors and Concerns:   Ongoing: Work stress, grief around loss of father and disconnection from siblings, limited support system   Current: Utilized session to discuss positive emotions and memories surrounding father. Discussed differences between experience discussing emotions now versus at beginning episode of care.      Treatment Objective(s) Addressed in This Session:   Client will develop vocabulary to describe feelings of grief and loss.   Client will identify steps toward managing grief.       Intervention:   CBT: Assisted with identifying and processing emotions around father's life and passing.        ASSESSMENT: Current Emotional / Mental Status (status of significant symptoms):   Risk status (Self / Other harm or suicidal ideation)   Client denies current fears or concerns for personal safety.   Client denies current or recent suicidal ideation or behaviors.   Client denies current or recent homicidal ideation or behaviors.   Client denies current or recent self injurious behavior or ideation.   Client denies other safety concerns.   Client Client reports there has been no change in risk factors since their last  session.     Client Client reports there has been no change in protective factors since their last session.     A safety and risk management plan has not been developed at this time, however client was given the after-hours number / 911 should there be a change in any of these risk factors.     Appearance:   Appropriate    Eye Contact:   Good    Psychomotor Behavior: Normal    Attitude:   Cooperative    Orientation:   All   Speech    Rate / Production: Normal     Volume:  Normal    Mood:    Normal   Affect:    Appropriate    Thought Content:  Clear    Thought Form:  Coherent  Logical    Insight:    Good      Medication Review:   No current psychiatric medications prescribed     Medication Compliance:   NA     Changes in Health Issues:   None reported     Chemical Use Review:   Substance Use: Chemical use reviewed, no active concerns identified      Tobacco Use: No current tobacco use.       Collateral Reports Completed:   Not Applicable    PLAN: (Client Tasks / Therapist Tasks / Other)  Follow-up appointment scheduled.        Mi Curiel MA, [facility]                                                        ________________________________________________________________________    Treatment Plan    Client's Name: Sola Silverman  YOB: 1965    Date: 8/2/18    Diagnoses:            Adjustment Disorders  309.28 (F43.23) With mixed anxiety and depressed mood  Psychosocial & Contextual Factors: Death of father, estrangement from sister, limited support network  WHODAS: 23    Referral / Collaboration:  Referral to another professional/service is not indicated at this time..    Anticipated number of session or this episode of care: 30      MeasurableTreatment Goal(s) related to diagnosis / functional impairment(s)  Goal 1: Client will increase understanding and engagement in the grief process.    Objective #A (Client Action)    Client will increase understanding of steps in the grief process.  Status: New -  Date: 8/2/18     Intervention(s)  Therapist will assign homework    provide educational materials on grief process.    Objective #B  Client will develop vocabulary to describe feelings of grief and loss.  Status: New - Date: 8/2/18     Intervention(s)  Therapist will assign homework    teach emotional recognition/identification.      Objective #C  Client will identify steps toward managing grief.  Status: New - Date: 8/2/18     Intervention(s)  Therapist will assign homework    teach Mindfulness, Distress Tolerance, and Emotion Regulation skills.      Goal 2: Client will decrease anxiety symptoms, and increase engaging in stress management techniques as evidence by HERMINIO-7 scores.    Objective #A (Client Action)    Status: New - Date: 8/2/18     Client will engage in a physical activity 3 times weekly for 15 minutes.    Intervention(s)  Therapist will assign homework    teach emotional regulation skills.      Objective #B  Client will use at least 3 coping skills for anxiety management in the next 3 weeks.    Status: New - Date: 8/2/18     Intervention(s)  Therapist will assign homework    teach Distress Tolerance techniques.    Objective #C  Client will identify 3 initial signs or symptoms of anxiety.  Status: New - Date: 8/2/18     Intervention(s)  Therapist will assign homework    teach emotional recognition/identification.           Client has reviewed and agreed to the above plan.      Mi Curiel [facility], MA 12/5/18

## 2018-12-07 ASSESSMENT — ANXIETY QUESTIONNAIRES: GAD7 TOTAL SCORE: 9

## 2018-12-20 ENCOUNTER — OFFICE VISIT (OUTPATIENT)
Dept: PSYCHOLOGY | Facility: CLINIC | Age: 53
End: 2018-12-20
Payer: COMMERCIAL

## 2018-12-20 DIAGNOSIS — F43.23 ADJUSTMENT DISORDER WITH MIXED ANXIETY AND DEPRESSED MOOD: Primary | ICD-10-CM

## 2018-12-20 DIAGNOSIS — F43.21 COMPLICATED GRIEF: ICD-10-CM

## 2018-12-20 PROCEDURE — 90834 PSYTX W PT 45 MINUTES: CPT | Performed by: COUNSELOR

## 2018-12-20 NOTE — PROGRESS NOTES
Progress Note    Client Name: Sola Silverman  Date: 12/20/18         Service Type: Individual      Session Start Time: 10:33 am  Session End Time: 11:25 am      Session Length: 52 min     Session #: 13     Attendees: Client attended alone    Treatment Plan Last Reviewed: 12/20/18 (Review by 3/20/19)  PHQ-9 / HERMINIO-7 : ---  PG-13 (Grief assessment): Completed in today's session, scanned into Epic.    DATA      Progress Since Last Session (Related to Symptoms / Goals / Homework):   Symptoms: No change      Homework: Achieved / completed to satisfaction       Episode of Care Goals: Satisfactory progress - ACTION (Actively working towards change); Intervened by reinforcing change plan / affirming steps taken     Current / Ongoing Stressors and Concerns:   Ongoing: Work stress, grief around loss of father and disconnection from siblings, limited support system   Current: Utilized session to discuss positive emotions and memories surrounding father. Discussed differences between experience discussing emotions now versus at beginning episode of care.      Treatment Objective(s) Addressed in This Session:   Client will increase understanding of steps in the grief process.   Client will identify steps toward managing grief.       Intervention:   CBT: Assisted with identifying maladaptive thought around father's death that perpetuates unwarrented guilt. Utilized facts to challenge and identify alternative ways in understanding death.        ASSESSMENT: Current Emotional / Mental Status (status of significant symptoms):   Risk status (Self / Other harm or suicidal ideation)   Client denies current fears or concerns for personal safety.   Client denies current or recent suicidal ideation or behaviors.   Client denies current or recent homicidal ideation or behaviors.   Client denies current or recent self injurious behavior or ideation.   Client denies other safety  concerns.   Client Client reports there has been no change in risk factors since their last session.     Client Client reports there has been no change in protective factors since their last session.     A safety and risk management plan has not been developed at this time, however client was given the after-hours number / 911 should there be a change in any of these risk factors.     Appearance:   Appropriate    Eye Contact:   Good    Psychomotor Behavior: Normal    Attitude:   Cooperative    Orientation:   All   Speech    Rate / Production: Normal     Volume:  Normal    Mood:    Normal   Affect:    Appropriate    Thought Content:  Clear    Thought Form:  Coherent  Logical    Insight:    Good      Medication Review:   No current psychiatric medications prescribed     Medication Compliance:   NA     Changes in Health Issues:   None reported     Chemical Use Review:   Substance Use: Chemical use reviewed, no active concerns identified      Tobacco Use: No current tobacco use.       Collateral Reports Completed:   Not Applicable    PLAN: (Client Tasks / Therapist Tasks / Other)  Client is assigned to engage in an activity to acknowledge or memorialize father, and allow emotions to come without effort to change or  them. Follow-up appointment scheduled.        Mi Curiel MA, [facility]                                                        ________________________________________________________________________    Treatment Plan    Client's Name: Sola Silverman  YOB: 1965    Date: 12/20/18    Diagnoses:            Adjustment Disorders  309.28 (F43.23) With mixed anxiety and depressed mood  Psychosocial & Contextual Factors: Death of father, estrangement from sister, limited support network  WHODAS: 23    Referral / Collaboration:  Referral to another professional/service is not indicated at this time..    Anticipated number of session or this episode of care: 30      MeasurableTreatment Goal(s)  related to diagnosis / functional impairment(s)  Goal 1: Client will increase understanding and engagement in the grief process.    Objective #A (Client Action)    Client will increase understanding of steps in the grief process.  Status: Continued - Date(s): 12/20/18    Intervention(s)  Therapist will assign homework    provide educational materials on grief process.    Objective #B  Client will identify maladaptive thought around death, explore their validity, examine the evidence for them, and identify alternative, rational explanations..  Status: New - Date: 12/20/18     Intervention(s)  Therapist will assign homework    teach emotional recognition/identification.      Objective #C  Client will identify steps toward managing grief.  Status: Continued - Date(s): 12/20/18    Intervention(s)  Therapist will assign homework    teach Mindfulness, Distress Tolerance, and Emotion Regulation skills.      Goal 2: Client will decrease anxiety symptoms, and increase engaging in stress management techniques as evidence by HERMINIO-7 scores.    Objective #A (Client Action)    Status: Continued - Date(s): 12/20/18    Client will engage in a physical activity 3 times weekly for 15 minutes.    Intervention(s)  Therapist will assign homework    teach emotional regulation skills.      Objective #B  Client will use at least 3 coping skills for anxiety management in the next 3 weeks.    Status: Continued - Date(s): 8/20/18    Intervention(s)  Therapist will assign homework    teach Distress Tolerance techniques.    Objective #C  Client will identify 3 initial signs or symptoms of anxiety.  Status: Continued - Date(s): 12/20/18    Intervention(s)  Therapist will assign homework    teach emotional recognition/identification.           Client has reviewed and agreed to the above plan.      Mi Curiel [facility], MA 12/20/18

## 2018-12-27 ENCOUNTER — OFFICE VISIT (OUTPATIENT)
Dept: PSYCHOLOGY | Facility: CLINIC | Age: 53
End: 2018-12-27
Payer: COMMERCIAL

## 2018-12-27 DIAGNOSIS — F43.23 ADJUSTMENT DISORDER WITH MIXED ANXIETY AND DEPRESSED MOOD: Primary | ICD-10-CM

## 2018-12-27 PROCEDURE — 90834 PSYTX W PT 45 MINUTES: CPT | Performed by: COUNSELOR

## 2018-12-27 ASSESSMENT — PATIENT HEALTH QUESTIONNAIRE - PHQ9
SUM OF ALL RESPONSES TO PHQ QUESTIONS 1-9: 7
5. POOR APPETITE OR OVEREATING: SEVERAL DAYS

## 2018-12-27 ASSESSMENT — ANXIETY QUESTIONNAIRES
2. NOT BEING ABLE TO STOP OR CONTROL WORRYING: SEVERAL DAYS
1. FEELING NERVOUS, ANXIOUS, OR ON EDGE: MORE THAN HALF THE DAYS
3. WORRYING TOO MUCH ABOUT DIFFERENT THINGS: MORE THAN HALF THE DAYS
GAD7 TOTAL SCORE: 9
5. BEING SO RESTLESS THAT IT IS HARD TO SIT STILL: NOT AT ALL
6. BECOMING EASILY ANNOYED OR IRRITABLE: SEVERAL DAYS
IF YOU CHECKED OFF ANY PROBLEMS ON THIS QUESTIONNAIRE, HOW DIFFICULT HAVE THESE PROBLEMS MADE IT FOR YOU TO DO YOUR WORK, TAKE CARE OF THINGS AT HOME, OR GET ALONG WITH OTHER PEOPLE: SOMEWHAT DIFFICULT
7. FEELING AFRAID AS IF SOMETHING AWFUL MIGHT HAPPEN: MORE THAN HALF THE DAYS

## 2018-12-27 NOTE — PROGRESS NOTES
"                                             Progress Note    Client Name: Sola Silverman  Date: 12/20/18         Service Type: Individual      Session Start Time: 9:37 am  Session End Time: 10:15 am      Session Length: 38 min     Session #: 14     Attendees: Client attended alone    Treatment Plan Last Reviewed: 12/20/18 (Review by 3/20/19)  PHQ-9 / HERMINIO-7 : 7/9  PG-13 (Grief assessment): ---    DATA      Progress Since Last Session (Related to Symptoms / Goals / Homework):   Symptoms: No change      Homework: Achieved / completed to satisfaction       Episode of Care Goals: Satisfactory progress - ACTION (Actively working towards change); Intervened by reinforcing change plan / affirming steps taken     Current / Ongoing Stressors and Concerns:   Ongoing: Work stress, grief around loss of father and disconnection from siblings, limited support system   Current: Utilized session to discuss the holidays, reactions following previous session, and progress in therapy.      Treatment Objective(s) Addressed in This Session:   Client will increase understanding of steps in the grief process.   Client will identify steps toward managing grief.       Intervention:   CBT: Discussed progress of CBT-focused grief work. Identified pattern of \"intellectualizing\" in order to avoid experiencing emotion.    Discussed EMDR referral again, and discussed how it may assist in managing emotions and trauma response around death of her father.     ASSESSMENT: Current Emotional / Mental Status (status of significant symptoms):   Risk status (Self / Other harm or suicidal ideation)   Client denies current fears or concerns for personal safety.   Client denies current or recent suicidal ideation or behaviors.   Client denies current or recent homicidal ideation or behaviors.   Client denies current or recent self injurious behavior or ideation.   Client denies other safety concerns.   Client Client reports there has been no change in " risk factors since their last session.     Client Client reports there has been no change in protective factors since their last session.     A safety and risk management plan has not been developed at this time, however client was given the after-hours number / 911 should there be a change in any of these risk factors.     Appearance:   Appropriate    Eye Contact:   Good    Psychomotor Behavior: Normal    Attitude:   Cooperative    Orientation:   All   Speech    Rate / Production: Normal     Volume:  Normal    Mood:    Normal   Affect:    Appropriate    Thought Content:  Clear    Thought Form:  Coherent  Logical    Insight:    Good      Medication Review:   No current psychiatric medications prescribed     Medication Compliance:   NA     Changes in Health Issues:   None reported     Chemical Use Review:   Substance Use: Chemical use reviewed, no active concerns identified      Tobacco Use: No current tobacco use.       Collateral Reports Completed:   Not Applicable    PLAN: (Client Tasks / Therapist Tasks / Other)  Follow-up appointment scheduled. Client is encouraged to schedule appointment for EMDR with [facility] office if she wishes prior to next session.        Mi Curiel MA, [facility]                                                        ________________________________________________________________________    Treatment Plan    Client's Name: Sola Silverman  YOB: 1965    Date: 12/20/18    Diagnoses:            Adjustment Disorders  309.28 (F43.23) With mixed anxiety and depressed mood  Psychosocial & Contextual Factors: Death of father, estrangement from sister, limited support network  WHODAS: 23    Referral / Collaboration:  Referral to another professional/service is not indicated at this time..    Anticipated number of session or this episode of care: 30      MeasurableTreatment Goal(s) related to diagnosis / functional impairment(s)  Goal 1: Client will increase understanding and engagement  in the grief process.    Objective #A (Client Action)    Client will increase understanding of steps in the grief process.  Status: Continued - Date(s): 12/20/18    Intervention(s)  Therapist will assign homework    provide educational materials on grief process.    Objective #B  Client will identify maladaptive thought around death, explore their validity, examine the evidence for them, and identify alternative, rational explanations..  Status: New - Date: 12/20/18     Intervention(s)  Therapist will assign homework    teach emotional recognition/identification.      Objective #C  Client will identify steps toward managing grief.  Status: Continued - Date(s): 12/20/18    Intervention(s)  Therapist will assign homework    teach Mindfulness, Distress Tolerance, and Emotion Regulation skills.      Goal 2: Client will decrease anxiety symptoms, and increase engaging in stress management techniques as evidence by HERMINIO-7 scores.    Objective #A (Client Action)    Status: Continued - Date(s): 12/20/18    Client will engage in a physical activity 3 times weekly for 15 minutes.    Intervention(s)  Therapist will assign homework    teach emotional regulation skills.      Objective #B  Client will use at least 3 coping skills for anxiety management in the next 3 weeks.    Status: Continued - Date(s): 8/20/18    Intervention(s)  Therapist will assign homework    teach Distress Tolerance techniques.    Objective #C  Client will identify 3 initial signs or symptoms of anxiety.  Status: Continued - Date(s): 12/20/18    Intervention(s)  Therapist will assign homework    teach emotional recognition/identification.           Client has reviewed and agreed to the above plan.      Mi Curiel [facility], MA 12/27/18

## 2018-12-29 ASSESSMENT — ANXIETY QUESTIONNAIRES: GAD7 TOTAL SCORE: 9

## 2019-01-14 ENCOUNTER — OFFICE VISIT (OUTPATIENT)
Dept: PSYCHOLOGY | Facility: CLINIC | Age: 54
End: 2019-01-14
Payer: COMMERCIAL

## 2019-01-14 DIAGNOSIS — F43.23 ADJUSTMENT DISORDER WITH MIXED ANXIETY AND DEPRESSED MOOD: Primary | ICD-10-CM

## 2019-01-14 PROCEDURE — 90837 PSYTX W PT 60 MINUTES: CPT | Performed by: COUNSELOR

## 2019-01-14 ASSESSMENT — PATIENT HEALTH QUESTIONNAIRE - PHQ9
SUM OF ALL RESPONSES TO PHQ QUESTIONS 1-9: 4
5. POOR APPETITE OR OVEREATING: NOT AT ALL

## 2019-01-14 ASSESSMENT — ANXIETY QUESTIONNAIRES
GAD7 TOTAL SCORE: 9
IF YOU CHECKED OFF ANY PROBLEMS ON THIS QUESTIONNAIRE, HOW DIFFICULT HAVE THESE PROBLEMS MADE IT FOR YOU TO DO YOUR WORK, TAKE CARE OF THINGS AT HOME, OR GET ALONG WITH OTHER PEOPLE: SOMEWHAT DIFFICULT
5. BEING SO RESTLESS THAT IT IS HARD TO SIT STILL: NOT AT ALL
3. WORRYING TOO MUCH ABOUT DIFFERENT THINGS: MORE THAN HALF THE DAYS
7. FEELING AFRAID AS IF SOMETHING AWFUL MIGHT HAPPEN: MORE THAN HALF THE DAYS
6. BECOMING EASILY ANNOYED OR IRRITABLE: SEVERAL DAYS
1. FEELING NERVOUS, ANXIOUS, OR ON EDGE: MORE THAN HALF THE DAYS
2. NOT BEING ABLE TO STOP OR CONTROL WORRYING: MORE THAN HALF THE DAYS

## 2019-01-14 NOTE — PROGRESS NOTES
"                                             Progress Note    Client Name: Sola Silverman  Date: 1/14/19         Service Type: Individual      Session Start Time: 5:00 pm  Session End Time: 6:00 pm      Session Length: 60 min     Session #: 15     Attendees: Client attended alone    Treatment Plan Last Reviewed: 12/20/18 (Review by 3/20/19)  PHQ-9 / HERMINIO-7 : 4 / 9  PG-13 (Grief assessment): ---    DATA      Progress Since Last Session (Related to Symptoms / Goals / Homework):   Symptoms: No change      Homework: Achieved / completed to satisfaction       Episode of Care Goals: Satisfactory progress - ACTION (Actively working towards change); Intervened by reinforcing change plan / affirming steps taken     Current / Ongoing Stressors and Concerns:   Ongoing: Work stress, grief around loss of father and disconnection from siblings, limited support system   Current: Utilized session to discuss progress in therapy, EMDR referral and some anxiety client is experiencing around it, and ways in which client struggles with experiencing emotion without judgment. Client reports taking past vacation without going into the office, and how she feels this improved overall mood.      Treatment Objective(s) Addressed in This Session:   Client will increase understanding of steps in the grief process.   Client will identify steps toward managing grief.  Client will use at least 3 coping skills for anxiety management in the next 3 weeks.       Intervention:   CBT: Discussed progress of CBT-focused grief work. Identified pattern of \"intellectualizing\" in order to avoid experiencing emotion. Identified and challenged negative self-talk around grief emotions, and urge to \"problem-solve\" or rationalize grief. Reinforced effective use of self-care over New Years vacation to limit burn-out.   Discussed EMDR referral, and discussed how it may assist in managing emotions and trauma response around death of her father. Offered more " "psychoeducation around process to assist with limiting anxiety around referral.     ASSESSMENT: Current Emotional / Mental Status (status of significant symptoms):   Risk status (Self / Other harm or suicidal ideation)   Client denies current fears or concerns for personal safety.   Client denies current or recent suicidal ideation or behaviors.   Client denies current or recent homicidal ideation or behaviors.   Client denies current or recent self injurious behavior or ideation.   Client denies other safety concerns.   Client Client reports there has been no change in risk factors since their last session.     Client Client reports there has been no change in protective factors since their last session.     A safety and risk management plan has not been developed at this time, however client was given the after-hours number / 911 should there be a change in any of these risk factors.     Appearance:   Appropriate    Eye Contact:   Good    Psychomotor Behavior: Normal    Attitude:   Cooperative    Orientation:   All   Speech    Rate / Production: Normal     Volume:  Normal    Mood:    Normal   Affect:    Appropriate    Thought Content:  Clear    Thought Form:  Coherent  Logical    Insight:    Good      Medication Review:   No current psychiatric medications prescribed     Medication Compliance:   NA     Changes in Health Issues:   None reported     Chemical Use Review:   Substance Use: Chemical use reviewed, no active concerns identified      Tobacco Use: No current tobacco use.       Collateral Reports Completed:   Not Applicable    PLAN: (Client Tasks / Therapist Tasks / Other)  Homework: Practice identifying urges to \"problem-solve\" of  emotions around grief. Practice responding with validation.  Follow-up appointment scheduled.         Mi Curiel MA, [facility]                                                        ________________________________________________________________________    Treatment " Plan    Client's Name: Sola Silverman  YOB: 1965    Date: 12/20/18    Diagnoses:            Adjustment Disorders  309.28 (F43.23) With mixed anxiety and depressed mood  Psychosocial & Contextual Factors: Death of father, estrangement from sister, limited support network  WHODAS: 23    Referral / Collaboration:  Referral to another professional/service is not indicated at this time..    Anticipated number of session or this episode of care: 30      MeasurableTreatment Goal(s) related to diagnosis / functional impairment(s)  Goal 1: Client will increase understanding and engagement in the grief process.    Objective #A (Client Action)    Client will increase understanding of steps in the grief process.  Status: Continued - Date(s): 12/20/18    Intervention(s)  Therapist will assign homework    provide educational materials on grief process.    Objective #B  Client will identify maladaptive thought around death, explore their validity, examine the evidence for them, and identify alternative, rational explanations.  Status: New - Date: 12/20/18     Intervention(s)  Therapist will assign homework    teach emotional recognition/identification.      Objective #C  Client will identify steps toward managing grief.  Status: Continued - Date(s): 12/20/18    Intervention(s)  Therapist will assign homework    teach Mindfulness, Distress Tolerance, and Emotion Regulation skills.      Goal 2: Client will decrease anxiety symptoms, and increase engaging in stress management techniques as evidence by HERMINIO-7 scores.    Objective #A (Client Action)    Status: Continued - Date(s): 12/20/18    Client will engage in a physical activity 3 times weekly for 15 minutes.    Intervention(s)  Therapist will assign homework    teach emotional regulation skills.      Objective #B  Client will use at least 3 coping skills for anxiety management in the next 3 weeks.    Status: Continued - Date(s):  8/20/18    Intervention(s)  Therapist will assign homework    teach Distress Tolerance techniques.    Objective #C  Client will identify 3 initial signs or symptoms of anxiety.  Status: Continued - Date(s): 12/20/18    Intervention(s)  Therapist will assign homework    teach emotional recognition/identification.           Client has reviewed and agreed to the above plan.      JOE Cali, MA 1/14/19

## 2019-01-15 ASSESSMENT — ANXIETY QUESTIONNAIRES: GAD7 TOTAL SCORE: 9

## 2019-01-31 ENCOUNTER — TRANSFERRED RECORDS (OUTPATIENT)
Dept: HEALTH INFORMATION MANAGEMENT | Facility: CLINIC | Age: 54
End: 2019-01-31

## 2019-02-04 ENCOUNTER — OFFICE VISIT (OUTPATIENT)
Dept: PSYCHOLOGY | Facility: CLINIC | Age: 54
End: 2019-02-04
Payer: COMMERCIAL

## 2019-02-04 DIAGNOSIS — F43.23 ADJUSTMENT DISORDER WITH MIXED ANXIETY AND DEPRESSED MOOD: Primary | ICD-10-CM

## 2019-02-04 PROCEDURE — 90837 PSYTX W PT 60 MINUTES: CPT | Performed by: COUNSELOR

## 2019-02-04 NOTE — PROGRESS NOTES
"                                             Progress Note    Client Name: Sola Silverman  Date: 2/4/19         Service Type: Individual      Session Start Time: 5:00 pm  Session End Time: 6:00 pm      Session Length: 60 min     Session #: 16     Attendees: Client attended alone    Treatment Plan Last Reviewed: 12/20/18 (Review by 3/20/19)  PHQ-9 / HERMINIO-7 : ---  PG-13 (Grief assessment): ---    DATA      Progress Since Last Session (Related to Symptoms / Goals / Homework):   Symptoms: No change      Homework: Achieved / completed to satisfaction       Episode of Care Goals: Satisfactory progress - ACTION (Actively working towards change); Intervened by reinforcing change plan / affirming steps taken     Current / Ongoing Stressors and Concerns:   Ongoing: Work stress, grief around loss of father and disconnection from siblings, limited support system   Current: Client discussed having heart pains that led to an ER visit in the previous week. Client denies they found anything concerning, but has been referred to additional testing this week. Client reports increased stress due to this, and fear about what may come of it. She reports this has heightened a sense of loneliness, identifying not having a spouse or partner to assist with this is difficult. Discussed past history of dating and close connection with male friend that historically has interfered with developing romantic relationships.     Treatment Objective(s) Addressed in This Session:   Client will use at least 3 coping skills for anxiety management in the next 3 weeks.      Intervention:   CBT: Offered validation and challenged \"fortune-telling\" assumptions that may increase distress levels. Discussed long term goals and areas of life that client identifies may be worth increasing energy towards in order to increase sense of life fufillment outside of work. Coached on use of mindfulness in order to focus on the facts of now, versus putting energy and " concern into a situation that may not happen.         ASSESSMENT: Current Emotional / Mental Status (status of significant symptoms):   Risk status (Self / Other harm or suicidal ideation)   Client denies current fears or concerns for personal safety.   Client denies current or recent suicidal ideation or behaviors.   Client denies current or recent homicidal ideation or behaviors.   Client denies current or recent self injurious behavior or ideation.   Client denies other safety concerns.   Client Client reports there has been no change in risk factors since their last session.     Client Client reports there has been no change in protective factors since their last session.     A safety and risk management plan has not been developed at this time, however client was given the after-hours number / 911 should there be a change in any of these risk factors.     Appearance:   Appropriate    Eye Contact:   Good    Psychomotor Behavior: Normal    Attitude:   Cooperative    Orientation:   All   Speech    Rate / Production: Normal     Volume:  Normal    Mood:    Normal   Affect:    Appropriate    Thought Content:  Clear    Thought Form:  Coherent  Logical    Insight:    Good      Medication Review:   No current psychiatric medications prescribed     Medication Compliance:   NA     Changes in Health Issues:   None reported     Chemical Use Review:   Substance Use: Chemical use reviewed, no active concerns identified      Tobacco Use: No current tobacco use.       Collateral Reports Completed:   Not Applicable    PLAN: (Client Tasks / Therapist Tasks / Other)  Homework: Practice focusing on one thing at a time when experiencing catastrophic thoughts.  Follow-up appointment scheduled.         Mi Curiel MA, [facility]                                                        ________________________________________________________________________    Treatment Plan    Client's Name: Sola Friedman Herrera  Date Of  Birth: 1965    Date: 12/20/18    Diagnoses:            Adjustment Disorders  309.28 (F43.23) With mixed anxiety and depressed mood  Psychosocial & Contextual Factors: Death of father, estrangement from sister, limited support network  WHODAS: 23    Referral / Collaboration:  Referral to another professional/service is not indicated at this time..    Anticipated number of session or this episode of care: 30      MeasurableTreatment Goal(s) related to diagnosis / functional impairment(s)  Goal 1: Client will increase understanding and engagement in the grief process.    Objective #A (Client Action)    Client will increase understanding of steps in the grief process.  Status: Continued - Date(s): 12/20/18    Intervention(s)  Therapist will assign homework    provide educational materials on grief process.    Objective #B  Client will identify maladaptive thought around death, explore their validity, examine the evidence for them, and identify alternative, rational explanations.  Status: New - Date: 12/20/18     Intervention(s)  Therapist will assign homework    teach emotional recognition/identification.      Objective #C  Client will identify steps toward managing grief.  Status: Continued - Date(s): 12/20/18    Intervention(s)  Therapist will assign homework    teach Mindfulness, Distress Tolerance, and Emotion Regulation skills.      Goal 2: Client will decrease anxiety symptoms, and increase engaging in stress management techniques as evidence by HERMINIO-7 scores.    Objective #A (Client Action)    Status: Continued - Date(s): 12/20/18    Client will engage in a physical activity 3 times weekly for 15 minutes.    Intervention(s)  Therapist will assign homework    teach emotional regulation skills.      Objective #B  Client will use at least 3 coping skills for anxiety management in the next 3 weeks.    Status: Continued - Date(s): 8/20/18    Intervention(s)  Therapist will assign homework    teach Distress  Tolerance techniques.    Objective #C  Client will identify 3 initial signs or symptoms of anxiety.  Status: Continued - Date(s): 12/20/18    Intervention(s)  Therapist will assign homework    teach emotional recognition/identification.           Client has reviewed and agreed to the above plan.      Mi Curiel [facility], MA 2/4/19

## 2019-02-08 ENCOUNTER — TRANSFERRED RECORDS (OUTPATIENT)
Dept: HEALTH INFORMATION MANAGEMENT | Facility: CLINIC | Age: 54
End: 2019-02-08

## 2019-02-18 ENCOUNTER — OFFICE VISIT (OUTPATIENT)
Dept: PSYCHOLOGY | Facility: CLINIC | Age: 54
End: 2019-02-18
Payer: COMMERCIAL

## 2019-02-18 DIAGNOSIS — F43.23 ADJUSTMENT DISORDER WITH MIXED ANXIETY AND DEPRESSED MOOD: Primary | ICD-10-CM

## 2019-02-18 PROCEDURE — 90837 PSYTX W PT 60 MINUTES: CPT | Performed by: COUNSELOR

## 2019-02-18 NOTE — Clinical Note
Wei Cyr return to work :)This client will be transferring to you for EMDR. I had previously discussed her in consult. This is the gal who experienced a pretty traumatic death of her father. She's made great progress in grief work and engaging more in daily life, and I think EMDR is the natural next step for her. I've pumped you and the treatment up, but she continues to feel somewhat hesitant about it and will likely have lots of questions. She's an awesome client to work with and has a great sense of humor. I think you guys will get along great :)Let me know if you need anything additional from me. Good luck!Yuliana

## 2019-02-19 NOTE — PROGRESS NOTES
"                                             Progress Note    Client Name: Sola Silverman  Date: 2/18/19         Service Type: Individual      Session Start Time: 5:05 pm  Session End Time: 6:00 pm      Session Length: 55 min     Session #: 17     Attendees: Client attended alone    Treatment Plan Last Reviewed: 12/20/18 (Review by 3/20/19)      DATA      Progress Since Last Session (Related to Symptoms / Goals / Homework):   Symptoms: Improving      Homework: Achieved / completed to satisfaction       Episode of Care Goals: Satisfactory progress - ACTION (Actively working towards change); Intervened by reinforcing change plan / affirming steps taken     Current / Ongoing Stressors and Concerns:   Ongoing: Work stress, grief around loss of father and disconnection from siblings, limited support system   Current: Client reports receiving information from cardiology that her heart is extremely healthy, and it is likely that any chest pain was related to a possible strain in ribcage or stress levels, which client relates to work.     Client reports some continued anxiety around EMDR treatment, stating \"it's 70% avoidance, and 30% I dont need it.\" When prompted, client reports she feels as if grief has greatly decreased in intensity and reports engaging in much more positive thinking about father, and engaging in relationships consistently where she had previously avoided them. When prompted, client was able to identify why she feels EMDR may be helpful and willingness to meet with EMDR provider to gather further information.     Treatment Objective(s) Addressed in This Session:   Client will use at least 3 coping skills for anxiety management in the next 3 weeks.   Client will identify steps toward managing grief.     Intervention:   CBT: Discussed ways in which client may decrease work stressors, including setting more boundaries around her time, taking vacation time without answering work related emails and calls, " continuing engagement with friends, increasing exercise, and taking breaks throughout the workday.    Discussed progress with grief-focused treatment goals and increased engagement in self-care. Discussed EMDR referral, questions and concerns she may address in detail with EMDR therapist.    Motivational Interviewing    MI Intervention: Supported Autonomy, Collaboration, Evocation, Permission to raise concern or advise, Open-ended questions and Change talk (evoked)     Change Talk Expressed by the Patient: Need to change Activation Taking steps    Provider Response to Change Talk: E - Evoked more info from patient about behavior change, A - Affirmed patient's thoughts, decisions, or attempts at behavior change and S - Summarized patient's change talk statements          ASSESSMENT: Current Emotional / Mental Status (status of significant symptoms):   Risk status (Self / Other harm or suicidal ideation)   Client denies current fears or concerns for personal safety.   Client denies current or recent suicidal ideation or behaviors.   Client denies current or recent homicidal ideation or behaviors.   Client denies current or recent self injurious behavior or ideation.   Client denies other safety concerns.   Client Client reports there has been no change in risk factors since their last session.     Client Client reports there has been no change in protective factors since their last session.     A safety and risk management plan has not been developed at this time, however client was given the after-hours number / 911 should there be a change in any of these risk factors.     Appearance:   Appropriate    Eye Contact:   Good    Psychomotor Behavior: Normal    Attitude:   Cooperative    Orientation:   All   Speech    Rate / Production: Normal     Volume:  Normal    Mood:    Normal   Affect:    Appropriate    Thought Content:  Clear    Thought Form:  Coherent  Logical    Insight:    Good      Medication Review:   No  current psychiatric medications prescribed     Medication Compliance:   NA     Changes in Health Issues:   None reported     Chemical Use Review:   Substance Use: Chemical use reviewed, no active concerns identified      Tobacco Use: No current tobacco use.       Collateral Reports Completed:   Not Applicable    PLAN: (Client Tasks / Therapist Tasks / Other)  Client will be transferring care to Angelo Novak at [facility] to begin EMDR therapy. Client is welcome to return to services with writer in the future.       Mi Curiel MA, [facility]                                                        ________________________________________________________________________    Treatment Plan    Client's Name: Sola Silverman  YOB: 1965    Date: 12/20/18    Diagnoses:            Adjustment Disorders  309.28 (F43.23) With mixed anxiety and depressed mood  Psychosocial & Contextual Factors: Death of father, estrangement from sister, limited support network  WHODAS: 23    Referral / Collaboration:  Referral to another professional/service is not indicated at this time..    Anticipated number of session or this episode of care: 30      MeasurableTreatment Goal(s) related to diagnosis / functional impairment(s)  Goal 1: Client will increase understanding and engagement in the grief process.    Objective #A (Client Action)    Client will increase understanding of steps in the grief process.  Status: Continued - Date(s): 12/20/18    Intervention(s)  Therapist will assign homework    provide educational materials on grief process.    Objective #B  Client will identify maladaptive thought around death, explore their validity, examine the evidence for them, and identify alternative, rational explanations.  Status: New - Date: 12/20/18     Intervention(s)  Therapist will assign homework    teach emotional recognition/identification.      Objective #C  Client will identify steps toward managing grief.  Status:  Continued - Date(s): 12/20/18    Intervention(s)  Therapist will assign homework    teach Mindfulness, Distress Tolerance, and Emotion Regulation skills.      Goal 2: Client will decrease anxiety symptoms, and increase engaging in stress management techniques as evidence by HERMINIO-7 scores.    Objective #A (Client Action)    Status: Continued - Date(s): 12/20/18    Client will engage in a physical activity 3 times weekly for 15 minutes.    Intervention(s)  Therapist will assign homework    teach emotional regulation skills.      Objective #B  Client will use at least 3 coping skills for anxiety management in the next 3 weeks.    Status: Continued - Date(s): 8/20/18    Intervention(s)  Therapist will assign homework    teach Distress Tolerance techniques.    Objective #C  Client will identify 3 initial signs or symptoms of anxiety.  Status: Continued - Date(s): 12/20/18    Intervention(s)  Therapist will assign homework    teach emotional recognition/identification.           Client has reviewed and agreed to the above plan.      Mi Curiel [facility], MA 2/18/19

## 2019-02-20 ENCOUNTER — OFFICE VISIT (OUTPATIENT)
Dept: INTERNAL MEDICINE | Facility: CLINIC | Age: 54
End: 2019-02-20
Attending: INTERNAL MEDICINE
Payer: COMMERCIAL

## 2019-02-20 VITALS
BODY MASS INDEX: 41.9 KG/M2 | DIASTOLIC BLOOD PRESSURE: 93 MMHG | HEART RATE: 73 BPM | WEIGHT: 271.5 LBS | SYSTOLIC BLOOD PRESSURE: 145 MMHG

## 2019-02-20 DIAGNOSIS — Z15.09 BRCA2 GENE MUTATION POSITIVE IN FEMALE: ICD-10-CM

## 2019-02-20 DIAGNOSIS — Z15.01 BRCA2 GENE MUTATION POSITIVE IN FEMALE: ICD-10-CM

## 2019-02-20 DIAGNOSIS — Z15.02 BRCA2 GENE MUTATION POSITIVE IN FEMALE: ICD-10-CM

## 2019-02-20 DIAGNOSIS — Z79.4 TYPE 2 DIABETES MELLITUS WITHOUT COMPLICATION, WITH LONG-TERM CURRENT USE OF INSULIN (H): ICD-10-CM

## 2019-02-20 DIAGNOSIS — E11.9 TYPE 2 DIABETES MELLITUS WITHOUT COMPLICATION, WITH LONG-TERM CURRENT USE OF INSULIN (H): ICD-10-CM

## 2019-02-20 DIAGNOSIS — I10 ESSENTIAL HYPERTENSION: Primary | ICD-10-CM

## 2019-02-20 PROCEDURE — G0463 HOSPITAL OUTPT CLINIC VISIT: HCPCS | Mod: ZF

## 2019-02-20 RX ORDER — HYDROCHLOROTHIAZIDE 12.5 MG/1
12.5 TABLET ORAL DAILY
Qty: 30 TABLET | Refills: 3 | Status: SHIPPED | OUTPATIENT
Start: 2019-02-20 | End: 2019-03-08

## 2019-02-20 ASSESSMENT — ENCOUNTER SYMPTOMS
CLAUDICATION: 0
COUGH: 0
POLYPHAGIA: 0
BACK PAIN: 0
SINUS CONGESTION: 0
FATIGUE: 0
PANIC: 0
DYSPNEA ON EXERTION: 0
POLYDIPSIA: 0
CHILLS: 0
INSOMNIA: 0
BRUISES/BLEEDS EASILY: 0
FEVER: 0
DECREASED CONCENTRATION: 0
DEPRESSION: 0
NERVOUS/ANXIOUS: 0
ALTERED TEMPERATURE REGULATION: 0
SWOLLEN GLANDS: 0

## 2019-02-20 ASSESSMENT — ANXIETY QUESTIONNAIRES
2. NOT BEING ABLE TO STOP OR CONTROL WORRYING: MORE THAN HALF THE DAYS
3. WORRYING TOO MUCH ABOUT DIFFERENT THINGS: MORE THAN HALF THE DAYS
7. FEELING AFRAID AS IF SOMETHING AWFUL MIGHT HAPPEN: MORE THAN HALF THE DAYS
5. BEING SO RESTLESS THAT IT IS HARD TO SIT STILL: NOT AT ALL
1. FEELING NERVOUS, ANXIOUS, OR ON EDGE: NEARLY EVERY DAY
GAD7 TOTAL SCORE: 11
6. BECOMING EASILY ANNOYED OR IRRITABLE: SEVERAL DAYS

## 2019-02-20 ASSESSMENT — PATIENT HEALTH QUESTIONNAIRE - PHQ9
5. POOR APPETITE OR OVEREATING: SEVERAL DAYS
SUM OF ALL RESPONSES TO PHQ QUESTIONS 1-9: 7

## 2019-02-20 ASSESSMENT — PAIN SCALES - GENERAL: PAINLEVEL: NO PAIN (0)

## 2019-02-20 NOTE — LETTER
2/20/2019       RE: Sola Silverman  [address] [place] [address]     Dear Colleague,    Thank you for referring your patient, Sola Silverman, to the [facility]  at [facility]. Please see a copy of my visit note below.    HPI  Patient is here for follow up on diabetes and hypertension. She states that she was in the hospital recently with chest pain. She had a CT coronary angiogram which was negative for stenosis or coronary calcium.     Review of Systems     Constitutional:  Negative for fever, chills and fatigue.   HENT:  Negative for sinus congestion.    Eyes:  Negative for decreased vision.   Respiratory:   Negative for cough and dyspnea on exertion.    Cardiovascular:  Positive for chest pain. Negative for dyspnea on exertion and claudication.   Musculoskeletal:  Negative for back pain.   Skin:  Negative for itching and scarring.   Endo/Heme:  Negative for anemia, swollen glands and bruises/bleeds easily.   Psychiatric/Behavioral:  Negative for depression, decreased concentration, mood swings and panic attacks.    Endocrine:  Negative for altered temperature regulation, polyphagia, polydipsia, unwanted hair growth and change in facial hair.    Current Outpatient Medications   Medication     aspirin 81 MG tablet     blood glucose monitoring (ACCU-CHEK COMPACT CARE KIT) meter device kit     blood glucose monitoring (ACCU-CHEK SMARTVIEW) test strip     Blood Glucose Monitoring Suppl ([place] CONTOUR NEXT MONITOR) W/DEVICE KIT     Calcium-Vitamin D (CALCIUM + D PO)     Cetirizine HCl (ZYRTEC PO)     losartan (COZAAR) 100 MG tablet     metFORMIN (GLUCOPHAGE-XR) 500 MG 24 hr tablet     simvastatin (ZOCOR) 20 MG tablet     VITAMIN D, CHOLECALCIFEROL, PO     YUVAFEM 10 MCG TABS vaginal tablet     No current facility-administered medications for this visit.      Vitals:    02/20/19 1005 02/20/19 1014 02/20/19 1015 02/20/19 1016   BP: (!) 146/91  (!) 144/94 (!) 146/93 (!) 145/93   Pulse: 73 73 73 73   Weight: 123.2 kg (271 lb 8 oz)          Physical Exam   Constitutional: She is oriented to person, place, and time. She appears well-developed and well-nourished.   HENT:   Head: Normocephalic and atraumatic.   Eyes: EOM are normal. Pupils are equal, round, and reactive to light.   Cardiovascular: Normal rate.   Pulmonary/Chest: Effort normal.   Neurological: She is alert and oriented to person, place, and time.   Psychiatric: She has a normal mood and affect. Her behavior is normal. Judgment and thought content normal.   Vitals reviewed.    Assessment and Plan:  Sola was seen today for recheck.    Diagnoses and all orders for this visit:    Essential hypertension. Blood pressure is elevated today. Recommend adding hydrochlorothiazide to the treatment regimen. Patient will follow up in 2 weeks for blood pressure check.   -     hydrochlorothiazide (HYDRODIURIL) 12.5 MG tablet; Take 1 tablet (12.5 mg) by mouth daily    Type 2 diabetes mellitus without complication, with long-term current use of insulin (H). Patient was advised to check Hgb A1C at the next visit. Will discuss glycemic control in follow up.   -     Basic Metabolic Panel; Future  -     Hgb A1c; Future    BRCA2 gene mutation positive in female  -     ; Future      Total time spent 25 minutes.  More than 50% of the time spent with Ms. Silverman on counseling / coordinating her care    Alda Santamaria MD

## 2019-02-21 ASSESSMENT — ANXIETY QUESTIONNAIRES: GAD7 TOTAL SCORE: 11

## 2019-02-26 ENCOUNTER — OFFICE VISIT (OUTPATIENT)
Dept: PSYCHOLOGY | Facility: CLINIC | Age: 54
End: 2019-02-26
Payer: COMMERCIAL

## 2019-02-26 DIAGNOSIS — F43.23 ADJUSTMENT DISORDER WITH MIXED ANXIETY AND DEPRESSED MOOD: Primary | ICD-10-CM

## 2019-02-26 PROCEDURE — 90834 PSYTX W PT 45 MINUTES: CPT | Performed by: SOCIAL WORKER

## 2019-02-26 ASSESSMENT — ANXIETY QUESTIONNAIRES
3. WORRYING TOO MUCH ABOUT DIFFERENT THINGS: MORE THAN HALF THE DAYS
6. BECOMING EASILY ANNOYED OR IRRITABLE: SEVERAL DAYS
7. FEELING AFRAID AS IF SOMETHING AWFUL MIGHT HAPPEN: MORE THAN HALF THE DAYS
2. NOT BEING ABLE TO STOP OR CONTROL WORRYING: MORE THAN HALF THE DAYS
5. BEING SO RESTLESS THAT IT IS HARD TO SIT STILL: NOT AT ALL
1. FEELING NERVOUS, ANXIOUS, OR ON EDGE: MORE THAN HALF THE DAYS
IF YOU CHECKED OFF ANY PROBLEMS ON THIS QUESTIONNAIRE, HOW DIFFICULT HAVE THESE PROBLEMS MADE IT FOR YOU TO DO YOUR WORK, TAKE CARE OF THINGS AT HOME, OR GET ALONG WITH OTHER PEOPLE: SOMEWHAT DIFFICULT
GAD7 TOTAL SCORE: 10

## 2019-02-26 ASSESSMENT — PATIENT HEALTH QUESTIONNAIRE - PHQ9
5. POOR APPETITE OR OVEREATING: SEVERAL DAYS
SUM OF ALL RESPONSES TO PHQ QUESTIONS 1-9: 8

## 2019-02-26 NOTE — PROGRESS NOTES
"                                             Progress Note    Client Name: Sola Silverman  Date: 2/26/19         Service Type: Individual      Session Start Time: 10  Session End Time: 10:45 am      Session Length: 55 min     Session #: 18     Attendees: Client attended alone    Treatment Plan Last Reviewed: 12/20/18 (Review by 3/20/19)  Phq=8; renu=10    DATA      Progress Since Last Session (Related to Symptoms / Goals / Homework):   Symptoms: stable    Homework: Achieved / completed to satisfaction read emdr handout.      Episode of Care Goals: Satisfactory progress - ACTION (Actively working towards change); Intervened by reinforcing change plan / affirming steps taken    Current / Ongoing Stressors and Concerns:  Ongoing: Work stress, grief around loss of father and disconnection from siblings, limited support system  Current: Client reports receiving information from cardiology that her heart is extremely healthy, and it is likely that any chest pain was related to a possible strain in ribcage or stress levels, which client relates to work.     Client reports some continued anxiety around EMDR treatment, stating \"it's 70% avoidance, and 30% I dont need it.\" When prompted, client reports she feels as if grief has greatly decreased in intensity and reports engaging in much more positive thinking about father, and engaging in relationships consistently where she had previously avoided them. When prompted, client was able to identify why she feels EMDR may be helpful and willingness to meet with EMDR provider to gather further information.     Treatment Objective(s) Addressed in This Session:   Client will use at least 3 coping skills for anxiety management in the next 3 weeks.   Client will identify steps toward managing grief.    Intervention:  Assessed functioning. Went over the results of the phq/renu. Developing rapport. Explored loss of father and impact on her. Educated on emdr.      ASSESSMENT: Current " Emotional / Mental Status (status of significant symptoms):   Risk status (Self / Other harm or suicidal ideation)   Client denies current fears or concerns for personal safety.   Client denies current or recent suicidal ideation or behaviors.   Client denies current or recent homicidal ideation or behaviors.   Client denies current or recent self injurious behavior or ideation.   Client denies other safety concerns.   Client Client reports there has been no change in risk factors since their last session.     Client Client reports there has been no change in protective factors since their last session.     A safety and risk management plan has not been developed at this time, however client was given the after-hours number / 911 should there be a change in any of these risk factors.     Appearance:   Appropriate    Eye Contact:   Good    Psychomotor Behavior: Normal    Attitude:   Cooperative    Orientation:   All   Speech    Rate / Production: Normal     Volume:  Normal    Mood:    Normal   Affect:    Appropriate    Thought Content:  Clear    Thought Form:  Coherent  Logical    Insight:    Good      Medication Review:   No current psychiatric medications prescribed     Medication Compliance:   NA     Changes in Health Issues:   None reported     Chemical Use Review:   Substance Use: Chemical use reviewed, no active concerns identified      Tobacco Use: No current tobacco use.       Collateral Reports Completed:   Not Applicable    PLAN: (Client Tasks / Therapist Tasks / Other)  Schedule biweekly. Read emdr handout and complete the HENRY.         Mi Curiel MA, [facility]                                                        ________________________________________________________________________    Treatment Plan    Client's Name: Sola Silverman  YOB: 1965    Date: 12/20/18    Diagnoses:            Adjustment Disorders  309.28 (F43.23) With mixed anxiety and depressed mood  Psychosocial &  Contextual Factors: Death of father, estrangement from sister, limited support network  WHODAS: 23    Referral / Collaboration:  Referral to another professional/service is not indicated at this time..    Anticipated number of session or this episode of care: 30      MeasurableTreatment Goal(s) related to diagnosis / functional impairment(s)  Goal 1: Client will increase understanding and engagement in the grief process.    Objective #A (Client Action)    Client will increase understanding of steps in the grief process.  Status: Continued - Date(s): 12/20/18    Intervention(s)  Therapist will assign homework    provide educational materials on grief process.    Objective #B  Client will identify maladaptive thought around death, explore their validity, examine the evidence for them, and identify alternative, rational explanations.  Status: New - Date: 12/20/18     Intervention(s)  Therapist will assign homework    teach emotional recognition/identification.      Objective #C  Client will identify steps toward managing grief.  Status: Continued - Date(s): 12/20/18    Intervention(s)  Therapist will assign homework    teach Mindfulness, Distress Tolerance, and Emotion Regulation skills.      Goal 2: Client will decrease anxiety symptoms, and increase engaging in stress management techniques as evidence by HERMINIO-7 scores.    Objective #A (Client Action)    Status: Continued - Date(s): 12/20/18    Client will engage in a physical activity 3 times weekly for 15 minutes.    Intervention(s)  Therapist will assign homework    teach emotional regulation skills.      Objective #B  Client will use at least 3 coping skills for anxiety management in the next 3 weeks.    Status: Continued - Date(s): 8/20/18    Intervention(s)  Therapist will assign homework    teach Distress Tolerance techniques.    Objective #C  Client will identify 3 initial signs or symptoms of anxiety.  Status: Continued - Date(s):  12/20/18    Intervention(s)  Therapist will assign homework    teach emotional recognition/identification.           Client has reviewed and agreed to the above plan.      Marcello Novak [facility], MA 2/18/19

## 2019-02-27 ASSESSMENT — ANXIETY QUESTIONNAIRES: GAD7 TOTAL SCORE: 10

## 2019-03-06 DIAGNOSIS — E11.9 TYPE 2 DIABETES MELLITUS WITHOUT COMPLICATION, WITHOUT LONG-TERM CURRENT USE OF INSULIN (H): ICD-10-CM

## 2019-03-06 DIAGNOSIS — Z79.4 TYPE 2 DIABETES MELLITUS WITHOUT COMPLICATION, WITH LONG-TERM CURRENT USE OF INSULIN (H): ICD-10-CM

## 2019-03-06 DIAGNOSIS — E11.9 TYPE 2 DIABETES MELLITUS WITHOUT COMPLICATION, WITH LONG-TERM CURRENT USE OF INSULIN (H): ICD-10-CM

## 2019-03-06 DIAGNOSIS — Z15.09 BRCA2 GENE MUTATION POSITIVE IN FEMALE: ICD-10-CM

## 2019-03-06 DIAGNOSIS — Z29.9 PREVENTIVE MEASURE: ICD-10-CM

## 2019-03-06 DIAGNOSIS — Z15.01 BRCA2 GENE MUTATION POSITIVE IN FEMALE: ICD-10-CM

## 2019-03-06 DIAGNOSIS — Z15.02 BRCA2 GENE MUTATION POSITIVE IN FEMALE: ICD-10-CM

## 2019-03-06 LAB
ANION GAP SERPL CALCULATED.3IONS-SCNC: 6 MMOL/L (ref 3–14)
BUN SERPL-MCNC: 11 MG/DL (ref 7–30)
CALCIUM SERPL-MCNC: 9.5 MG/DL (ref 8.5–10.1)
CANCER AG125 SERPL-ACNC: <5 U/ML (ref 0–30)
CHLORIDE SERPL-SCNC: 100 MMOL/L (ref 94–109)
CO2 SERPL-SCNC: 30 MMOL/L (ref 20–32)
CREAT SERPL-MCNC: 0.7 MG/DL (ref 0.52–1.04)
GFR SERPL CREATININE-BSD FRML MDRD: >90 ML/MIN/{1.73_M2}
GLUCOSE SERPL-MCNC: 169 MG/DL (ref 70–99)
HBA1C MFR BLD: 6.6 % (ref 0–5.6)
POTASSIUM SERPL-SCNC: 4.6 MMOL/L (ref 3.4–5.3)
SODIUM SERPL-SCNC: 136 MMOL/L (ref 133–144)

## 2019-03-06 PROCEDURE — 36415 COLL VENOUS BLD VENIPUNCTURE: CPT | Performed by: INTERNAL MEDICINE

## 2019-03-06 PROCEDURE — 80048 BASIC METABOLIC PNL TOTAL CA: CPT | Performed by: INTERNAL MEDICINE

## 2019-03-06 PROCEDURE — 86304 IMMUNOASSAY TUMOR CA 125: CPT | Performed by: INTERNAL MEDICINE

## 2019-03-06 PROCEDURE — 87389 HIV-1 AG W/HIV-1&-2 AB AG IA: CPT | Performed by: INTERNAL MEDICINE

## 2019-03-06 PROCEDURE — 83036 HEMOGLOBIN GLYCOSYLATED A1C: CPT | Performed by: INTERNAL MEDICINE

## 2019-03-06 PROCEDURE — 82043 UR ALBUMIN QUANTITATIVE: CPT | Performed by: INTERNAL MEDICINE

## 2019-03-07 LAB
CREAT UR-MCNC: 109 MG/DL
HIV 1+2 AB+HIV1 P24 AG SERPL QL IA: NONREACTIVE
MICROALBUMIN UR-MCNC: 13 MG/L
MICROALBUMIN/CREAT UR: 11.56 MG/G CR (ref 0–25)

## 2019-03-08 ENCOUNTER — OFFICE VISIT (OUTPATIENT)
Dept: INTERNAL MEDICINE | Facility: CLINIC | Age: 54
End: 2019-03-08
Attending: INTERNAL MEDICINE
Payer: COMMERCIAL

## 2019-03-08 VITALS
HEART RATE: 79 BPM | BODY MASS INDEX: 41.82 KG/M2 | WEIGHT: 271 LBS | SYSTOLIC BLOOD PRESSURE: 127 MMHG | DIASTOLIC BLOOD PRESSURE: 85 MMHG

## 2019-03-08 DIAGNOSIS — I10 ESSENTIAL HYPERTENSION: ICD-10-CM

## 2019-03-08 PROCEDURE — G0463 HOSPITAL OUTPT CLINIC VISIT: HCPCS | Mod: ZF

## 2019-03-08 RX ORDER — HYDROCHLOROTHIAZIDE 25 MG/1
25 TABLET ORAL DAILY
Qty: 30 TABLET | Refills: 0 | Status: SHIPPED | OUTPATIENT
Start: 2019-03-08 | End: 2019-04-19

## 2019-03-08 ASSESSMENT — PATIENT HEALTH QUESTIONNAIRE - PHQ9
5. POOR APPETITE OR OVEREATING: NOT AT ALL
SUM OF ALL RESPONSES TO PHQ QUESTIONS 1-9: 6

## 2019-03-08 ASSESSMENT — PAIN SCALES - GENERAL: PAINLEVEL: NO PAIN (0)

## 2019-03-08 ASSESSMENT — ANXIETY QUESTIONNAIRES
3. WORRYING TOO MUCH ABOUT DIFFERENT THINGS: MORE THAN HALF THE DAYS
6. BECOMING EASILY ANNOYED OR IRRITABLE: SEVERAL DAYS
7. FEELING AFRAID AS IF SOMETHING AWFUL MIGHT HAPPEN: MORE THAN HALF THE DAYS
2. NOT BEING ABLE TO STOP OR CONTROL WORRYING: MORE THAN HALF THE DAYS
5. BEING SO RESTLESS THAT IT IS HARD TO SIT STILL: NOT AT ALL
GAD7 TOTAL SCORE: 9
1. FEELING NERVOUS, ANXIOUS, OR ON EDGE: MORE THAN HALF THE DAYS

## 2019-03-08 NOTE — PROGRESS NOTES
HPI  Patient is here for follow-up on hypertension.  She states that she has been feeling well.  She has not noticed any significant side effects of medications.    Review of Systems     Constitutional:  Negative for fever and fatigue.   Eyes:  Negative for tunnel vision.   Respiratory:   Negative for cough and dyspnea on exertion.    Cardiovascular:  Negative for chest pain, dyspnea on exertion and edema.   Gastrointestinal:  Negative for nausea, abdominal pain and diarrhea.   Genitourinary:  Negative for flank pain.   Skin:  Negative for itching and rash.   Psychiatric/Behavioral:  Negative for depression, decreased concentration, mood swings and panic attacks.    Endocrine:  Negative for altered temperature regulation, polyphagia, polydipsia, unwanted hair growth and change in facial hair.    Current Outpatient Medications   Medication     aspirin 81 MG tablet     blood glucose monitoring (ACCU-CHEK COMPACT CARE KIT) meter device kit     blood glucose monitoring (ACCU-CHEK SMARTVIEW) test strip     Blood Glucose Monitoring Suppl ([employer] CONTOUR NEXT MONITOR) W/DEVICE KIT     Calcium-Vitamin D (CALCIUM + D PO)     Cetirizine HCl (ZYRTEC PO)     hydrochlorothiazide (HYDRODIURIL) 12.5 MG tablet     losartan (COZAAR) 100 MG tablet     metFORMIN (GLUCOPHAGE-XR) 500 MG 24 hr tablet     simvastatin (ZOCOR) 20 MG tablet     VITAMIN D, CHOLECALCIFEROL, PO     YUVAFEM 10 MCG TABS vaginal tablet     No current facility-administered medications for this visit.      Vitals:    03/08/19 1027 03/08/19 1028 03/08/19 1029 03/08/19 1030   BP: 130/85 128/85 124/85 127/85   Pulse: 79 79 79 79   Weight: 122.9 kg (271 lb)          Physical Exam   Constitutional: She appears well-developed and well-nourished.   HENT:   Head: Normocephalic and atraumatic.   Eyes: EOM are normal. Pupils are equal, round, and reactive to light.   Neck: Normal range of motion. Neck supple.   Cardiovascular: Normal rate and regular rhythm.   Pulmonary/Chest:  Effort normal and breath sounds normal.   Musculoskeletal: She exhibits no edema.   Skin: Skin is warm and dry.   Psychiatric: She has a normal mood and affect. Her behavior is normal. Judgment and thought content normal.   Nursing note and vitals reviewed.      Assessment and plan:    Sola was seen today for recheck.    Diagnoses and all orders for this visit:    Essential hypertension.  Blood pressure is slightly above goal.  Recommend to continue with titration of hydrochlorothiazide this patient has been able to tolerate well.  Follow-up in 2 weeks was advised to review progress as well as to check chemistry panel.  Patient is in agreement with the plan.  -     hydrochlorothiazide (HYDRODIURIL) 25 MG tablet; Take 1 tablet (25 mg) by mouth daily  -     Basic Metabolic Panel; Future      Total time spent 15 minutes.  More than 50% of the time spent with Ms. Silverman on counseling / coordinating her care    Alda Santamaria MD

## 2019-03-09 ASSESSMENT — ANXIETY QUESTIONNAIRES: GAD7 TOTAL SCORE: 9

## 2019-03-10 ASSESSMENT — ENCOUNTER SYMPTOMS
DIARRHEA: 0
ABDOMINAL PAIN: 0
POLYPHAGIA: 0
POLYDIPSIA: 0
NERVOUS/ANXIOUS: 0
COUGH: 0
INSOMNIA: 0
PANIC: 0
DEPRESSION: 0
FEVER: 0
FATIGUE: 0
ALTERED TEMPERATURE REGULATION: 0
DYSPNEA ON EXERTION: 0
NAUSEA: 0
FLANK PAIN: 0
DECREASED CONCENTRATION: 0

## 2019-03-11 ENCOUNTER — OFFICE VISIT (OUTPATIENT)
Dept: PSYCHOLOGY | Facility: CLINIC | Age: 54
End: 2019-03-11
Payer: COMMERCIAL

## 2019-03-11 DIAGNOSIS — F43.23 ADJUSTMENT DISORDER WITH MIXED ANXIETY AND DEPRESSED MOOD: Primary | ICD-10-CM

## 2019-03-11 PROCEDURE — 90834 PSYTX W PT 45 MINUTES: CPT | Performed by: SOCIAL WORKER

## 2019-03-11 ASSESSMENT — PATIENT HEALTH QUESTIONNAIRE - PHQ9
5. POOR APPETITE OR OVEREATING: NOT AT ALL
SUM OF ALL RESPONSES TO PHQ QUESTIONS 1-9: 7

## 2019-03-11 ASSESSMENT — ANXIETY QUESTIONNAIRES
7. FEELING AFRAID AS IF SOMETHING AWFUL MIGHT HAPPEN: MORE THAN HALF THE DAYS
3. WORRYING TOO MUCH ABOUT DIFFERENT THINGS: MORE THAN HALF THE DAYS
2. NOT BEING ABLE TO STOP OR CONTROL WORRYING: MORE THAN HALF THE DAYS
6. BECOMING EASILY ANNOYED OR IRRITABLE: SEVERAL DAYS
1. FEELING NERVOUS, ANXIOUS, OR ON EDGE: MORE THAN HALF THE DAYS
5. BEING SO RESTLESS THAT IT IS HARD TO SIT STILL: NOT AT ALL
GAD7 TOTAL SCORE: 9

## 2019-03-11 NOTE — Clinical Note
We are considering using emdr for loss of father. We will considering beginning with sister or brother in law issue to see how well it might work for her before taking on more emotionally charged issue of loss of father. Her primary therapist and I believe she is emotionally strong enough to do emdr. There can be concerns if there are significant medical conditions. She has high blood pressure which is being treated. Do you have any concerns about us using emdr? I am not expecting this to be too demanding on her emotionally/physically.

## 2019-03-11 NOTE — PROGRESS NOTES
Progress Note    Client Name: Sola Silverman  Date: 3/11/19         Service Type: Individual      Session Start Time: 6  Session End Time: 6:45 pm      Session Length: 45 min     Session #: 19     Attendees: Client attended alone.    Treatment Plan Last Reviewed:    Phq=7; renu=9.    DATA      Progress Since Last Session (Related to Symptoms / Goals / Homework):   Symptoms: improvement.    Homework: Achieved / completed to satisfaction read emdr handout (completed). Complete peaceful place picture.      Episode of Care Goals: Satisfactory progress - ACTION (Actively working towards change); Intervened by reinforcing change plan / affirming steps taken    Current / Ongoing Stressors and Concerns:  Ongoing: Work stress, grief around loss of father and disconnection from siblings, limited support system  Current:            Treatment Objective(s) Addressed in This Session:   Client will use at least 3 coping skills for anxiety management in the next 3 weeks.               Client will identify steps toward managing grief.    Intervention:  Assessed functioning. Went over the results of the phq/renu. Developing rapport. Explored loss of father and impact on her. Educated on emdr. Reviewed the HENRY; educated on dissociation. Completed the informed consent together. Used a guided meditation.      ASSESSMENT: Current Emotional / Mental Status (status of significant symptoms):   Risk status (Self / Other harm or suicidal ideation)   Client denies current fears or concerns for personal safety.   Client denies current or recent suicidal ideation or behaviors.   Client denies current or recent homicidal ideation or behaviors.   Client denies current or recent self injurious behavior or ideation.   Client denies other safety concerns.   Client Client reports there has been no change in risk factors since their last session.     Client Client reports there has been no change in  protective factors since their last session.     A safety and risk management plan has not been developed at this time, however client was given the after-hours number / 911 should there be a change in any of these risk factors.     Appearance:   Appropriate    Eye Contact:   Good    Psychomotor Behavior: Normal    Attitude:   Cooperative    Orientation:   All   Speech    Rate / Production: Normal     Volume:  Normal    Mood:    Normal   Affect:    Appropriate    Thought Content:  Clear    Thought Form:  Coherent  Logical    Insight:    Good      Medication Review:   No current psychiatric medications prescribed     Medication Compliance:   NA     Changes in Health Issues:   None reported     Chemical Use Review:   Substance Use: Chemical use reviewed, no active concerns identified      Tobacco Use: No current tobacco use.       Collateral Reports Completed:   Not Applicable    PLAN: (Client Tasks / Therapist Tasks / Other)  Schedule biweekly. Return with completed peaceful place picture. Consider emdr for relationship issue with sister or with brother in law before taking on loss of father and the guilt she feels concerning it. Complete treatment plan together.      Mi Curiel MA, [facility]                                                        ________________________________________________________________________    Treatment Plan    Client's Name: Sola Silverman  YOB: 1965    Date: 12/20/18    Diagnoses:            Adjustment Disorders  309.28 (F43.23) With mixed anxiety and depressed mood  Psychosocial & Contextual Factors: Death of father, estrangement from sister, limited support network  WHODAS: 23    Referral / Collaboration:  Referral to another professional/service is not indicated at this time. seeing me now for emdr.    Anticipated number of session or this episode of care: 10      MeasurableTreatment Goal(s) related to diagnosis / functional impairment(s)  Goal 1: Client will report  coping better with past upsetting events.            Client has reviewed and agreed to the above plan.      Marcello Novak [facility], MA 2/18/19

## 2019-03-12 ASSESSMENT — ANXIETY QUESTIONNAIRES: GAD7 TOTAL SCORE: 9

## 2019-03-25 ENCOUNTER — OFFICE VISIT (OUTPATIENT)
Dept: PSYCHOLOGY | Facility: CLINIC | Age: 54
End: 2019-03-25
Payer: COMMERCIAL

## 2019-03-25 DIAGNOSIS — F43.23 ADJUSTMENT DISORDER WITH MIXED ANXIETY AND DEPRESSED MOOD: Primary | ICD-10-CM

## 2019-03-25 PROCEDURE — 90834 PSYTX W PT 45 MINUTES: CPT | Performed by: SOCIAL WORKER

## 2019-03-25 ASSESSMENT — ANXIETY QUESTIONNAIRES
1. FEELING NERVOUS, ANXIOUS, OR ON EDGE: MORE THAN HALF THE DAYS
6. BECOMING EASILY ANNOYED OR IRRITABLE: SEVERAL DAYS
5. BEING SO RESTLESS THAT IT IS HARD TO SIT STILL: NOT AT ALL
GAD7 TOTAL SCORE: 10
2. NOT BEING ABLE TO STOP OR CONTROL WORRYING: MORE THAN HALF THE DAYS
3. WORRYING TOO MUCH ABOUT DIFFERENT THINGS: MORE THAN HALF THE DAYS
7. FEELING AFRAID AS IF SOMETHING AWFUL MIGHT HAPPEN: MORE THAN HALF THE DAYS

## 2019-03-25 ASSESSMENT — PATIENT HEALTH QUESTIONNAIRE - PHQ9
SUM OF ALL RESPONSES TO PHQ QUESTIONS 1-9: 7
5. POOR APPETITE OR OVEREATING: SEVERAL DAYS

## 2019-03-25 NOTE — PROGRESS NOTES
Progress Note    Client Name: Sola Silverman  Date: 3/25/19         Service Type: Individual      Session Start Time: 5  Session End Time: 5:45 pm      Session Length: 45 min     Session #: 20     Attendees: Client attended alone.    Treatment Plan Last Reviewed:  Due=7/1/19  Phq=7; renu=10.    DATA      Progress Since Last Session (Related to Symptoms / Goals / Homework):   Symptoms: stable.    Homework: Achieved / completed to satisfaction read emdr handout (completed). Complete peaceful place picture.      Episode of Care Goals: Satisfactory progress - ACTION (Actively working towards change); Intervened by reinforcing change plan / affirming steps taken    Current / Ongoing Stressors and Concerns:  Rift with sister and brother in law. Grieving loss of father.           Treatment Objective(s) Addressed in This Session:   Client will use at least 3 coping skills for anxiety management in the next 3 weeks.               Client will identify steps toward managing grief.    Intervention:  Assessed functioning. Went over the results of the phq/renu. Completed treatment plan. Explored loss of father and impact on her. Educated on emdr. Considered ptsd to explore later. Used a guided meditation.      ASSESSMENT: Current Emotional / Mental Status (status of significant symptoms):   Risk status (Self / Other harm or suicidal ideation)   Client denies current fears or concerns for personal safety.   Client denies current or recent suicidal ideation or behaviors.   Client denies current or recent homicidal ideation or behaviors.   Client denies current or recent self injurious behavior or ideation.   Client denies other safety concerns.   Client Client reports there has been no change in risk factors since their last session.     Client Client reports there has been no change in protective factors since their last session.     A safety and risk management plan has not been  developed at this time, however client was given the after-hours number / 911 should there be a change in any of these risk factors.     Appearance:   Appropriate    Eye Contact:   Good    Psychomotor Behavior: Normal    Attitude:   Cooperative    Orientation:   All   Speech    Rate / Production: Normal     Volume:  Normal    Mood:    Normal   Affect:    Appropriate    Thought Content:  Clear    Thought Form:  Coherent  Logical    Insight:    Good      Medication Review:   No current psychiatric medications prescribed     Medication Compliance:   NA     Changes in Health Issues:   None reported     Chemical Use Review:   Substance Use: Chemical use reviewed, no active concerns identified      Tobacco Use: No current tobacco use.       Collateral Reports Completed:   Not Applicable    PLAN: (Client Tasks / Therapist Tasks / Other)  Schedule biweekly. Consider emdr for relationship issue with sister or with brother in law before taking on loss of father and the guilt she feels concerning it.        Marcello Novak MS, [facility]                                                       ________________________________________________________________________    Treatment Plan    Client's Name: Sola Silverman  YOB: 1965    Date: 12/20/18    Diagnoses:            Adjustment Disorders  309.28 (F43.23) With mixed anxiety and depressed mood  Psychosocial & Contextual Factors: Death of father, estrangement from sister, limited support network  WHODAS: 23    Referral / Collaboration:  Referral to another professional/service is not indicated at this time. seeing me now for emdr.    Anticipated number of session or this episode of care: 10      MeasurableTreatment Goal(s) related to diagnosis / functional impairment(s)  Goal 1: Client will report coping better with past upsetting events.      Goal 2: Client will reprocess rift with sister until the negative cognition no longer feels true or upsetting.                New=3/25/19    Therapist will use emdr.            Client has reviewed and agreed to the above plan.      Marcello Novak [facility], MS 2/18/19

## 2019-03-25 NOTE — Clinical Note
"Stable scores. Working up to using emdr to address rift with sister working up to loss of father. Her father \" and came back a number of times\"; wondering if there might be some ptsd for her regarding this. Will explore next visit."

## 2019-03-26 ASSESSMENT — ANXIETY QUESTIONNAIRES: GAD7 TOTAL SCORE: 10

## 2019-03-27 DIAGNOSIS — I10 ESSENTIAL HYPERTENSION: ICD-10-CM

## 2019-03-27 LAB
ANION GAP SERPL CALCULATED.3IONS-SCNC: 5 MMOL/L (ref 3–14)
BUN SERPL-MCNC: 14 MG/DL (ref 7–30)
CALCIUM SERPL-MCNC: 9.1 MG/DL (ref 8.5–10.1)
CHLORIDE SERPL-SCNC: 101 MMOL/L (ref 94–109)
CO2 SERPL-SCNC: 32 MMOL/L (ref 20–32)
CREAT SERPL-MCNC: 0.77 MG/DL (ref 0.52–1.04)
GFR SERPL CREATININE-BSD FRML MDRD: 88 ML/MIN/{1.73_M2}
GLUCOSE SERPL-MCNC: 160 MG/DL (ref 70–99)
POTASSIUM SERPL-SCNC: 4.2 MMOL/L (ref 3.4–5.3)
SODIUM SERPL-SCNC: 138 MMOL/L (ref 133–144)

## 2019-03-27 PROCEDURE — 36415 COLL VENOUS BLD VENIPUNCTURE: CPT | Performed by: INTERNAL MEDICINE

## 2019-03-27 PROCEDURE — 80048 BASIC METABOLIC PNL TOTAL CA: CPT | Performed by: INTERNAL MEDICINE

## 2019-04-01 ENCOUNTER — OFFICE VISIT (OUTPATIENT)
Dept: INTERNAL MEDICINE | Facility: CLINIC | Age: 54
End: 2019-04-01
Attending: INTERNAL MEDICINE
Payer: COMMERCIAL

## 2019-04-01 ENCOUNTER — OFFICE VISIT (OUTPATIENT)
Dept: PSYCHOLOGY | Facility: CLINIC | Age: 54
End: 2019-04-01
Payer: COMMERCIAL

## 2019-04-01 VITALS
HEIGHT: 68 IN | SYSTOLIC BLOOD PRESSURE: 123 MMHG | WEIGHT: 265.4 LBS | HEART RATE: 80 BPM | DIASTOLIC BLOOD PRESSURE: 85 MMHG | BODY MASS INDEX: 40.22 KG/M2

## 2019-04-01 DIAGNOSIS — Z15.09 BRCA GENE MUTATION POSITIVE: Primary | ICD-10-CM

## 2019-04-01 DIAGNOSIS — F43.21 COMPLICATED GRIEF: ICD-10-CM

## 2019-04-01 DIAGNOSIS — I10 ESSENTIAL HYPERTENSION, BENIGN: ICD-10-CM

## 2019-04-01 DIAGNOSIS — Z15.01 BRCA GENE MUTATION POSITIVE: Primary | ICD-10-CM

## 2019-04-01 DIAGNOSIS — F43.10 POSTTRAUMATIC STRESS DISORDER: Primary | ICD-10-CM

## 2019-04-01 PROCEDURE — 90834 PSYTX W PT 45 MINUTES: CPT | Performed by: SOCIAL WORKER

## 2019-04-01 PROCEDURE — G0463 HOSPITAL OUTPT CLINIC VISIT: HCPCS | Mod: ZF

## 2019-04-01 ASSESSMENT — ENCOUNTER SYMPTOMS
DYSPNEA ON EXERTION: 0
ALTERED TEMPERATURE REGULATION: 0
EYE PAIN: 0
FATIGUE: 0
EYE IRRITATION: 1
INSOMNIA: 0
SWOLLEN GLANDS: 0
POLYDIPSIA: 0
BACK PAIN: 0
DECREASED CONCENTRATION: 0
POLYPHAGIA: 0
ARTHRALGIAS: 0
COUGH: 0
BRUISES/BLEEDS EASILY: 0
NERVOUS/ANXIOUS: 0
FEVER: 0
CHILLS: 0
DEPRESSION: 0
PANIC: 0

## 2019-04-01 ASSESSMENT — MIFFLIN-ST. JEOR: SCORE: 1849.41

## 2019-04-01 NOTE — Clinical Note
We should be ready next visit to begin emdr on sister yesi. We did use the dsm-5 today and found she was traumatized enough by her experience of her father's death to warrant a diagnosis of ptsd. The flashbacks and nightmares have subsided which is nice

## 2019-04-01 NOTE — PROGRESS NOTES
"HPI  Patient is here for follow up. She reports that she is working with a therapist. She denies significant changes in her symptoms with exception of dry eyes.     Review of Systems     Constitutional:  Negative for fever, chills and fatigue.   Eyes:  Positive for eye irritation. Negative for pain, eye pain and decreased vision.   Respiratory:   Negative for cough and dyspnea on exertion.    Cardiovascular:  Negative for chest pain, dyspnea on exertion and edema.   Musculoskeletal:  Negative for back pain and arthralgias.   Skin:  Negative for itching, rash and flaky skin.   Endo/Heme:  Negative for anemia, swollen glands and bruises/bleeds easily.   Psychiatric/Behavioral:  Negative for depression, decreased concentration, mood swings and panic attacks.    Endocrine:  Negative for altered temperature regulation, polyphagia, polydipsia, unwanted hair growth and change in facial hair.    Current Outpatient Medications   Medication     aspirin 81 MG tablet     blood glucose monitoring (ACCU-CHEK COMPACT CARE KIT) meter device kit     blood glucose monitoring (ACCU-CHEK SMARTVIEW) test strip     Blood Glucose Monitoring Suppl ([employer] CONTOUR NEXT MONITOR) W/DEVICE KIT     Calcium-Vitamin D (CALCIUM + D PO)     Cetirizine HCl (ZYRTEC PO)     hydrochlorothiazide (HYDRODIURIL) 25 MG tablet     losartan (COZAAR) 100 MG tablet     metFORMIN (GLUCOPHAGE-XR) 500 MG 24 hr tablet     simvastatin (ZOCOR) 20 MG tablet     VITAMIN D, CHOLECALCIFEROL, PO     YUVAFEM 10 MCG TABS vaginal tablet     No current facility-administered medications for this visit.      Vitals:    04/01/19 0837 04/01/19 0838 04/01/19 0839 04/01/19 0840   BP: 124/86 121/84 125/85 123/85   BP Location:    Left arm   Patient Position:    Chair   Pulse:    80   Weight:    120.4 kg (265 lb 6.4 oz)   Height:    1.715 m (5' 7.5\")         Physical Exam   Constitutional: She appears well-developed and well-nourished.   HENT:   Head: Normocephalic and atraumatic. "   Eyes: Pupils are equal, round, and reactive to light.   Neck: Normal range of motion. Neck supple.   Cardiovascular: Normal rate.   Pulmonary/Chest: Effort normal.   Musculoskeletal: She exhibits no edema.   Skin: Skin is warm and dry.   Psychiatric: She has a normal mood and affect. Her behavior is normal. Judgment and thought content normal.   Nursing note and vitals reviewed.    Assessment and Plan:  Sola was seen today for follow up.    Diagnoses and all orders for this visit:    BRCA gene mutation positive. Reviewed breast cancer screening in the setting of high-risk genetic mutation. Recommend obtaining breast MRI.   -     MR Breast Bilateral w/o & w Contrast; Future    Essential hypertension, benign. Blood pressure is within acceptable range. Will continue with current medical therapy without changes.     Complicated grief. Patient is currently working with a therapist. She does not want to start medical therapy. Will monitor symptoms, consider serotonin specific reuptake inhibitor therapy as indicated.     Total time spent 25 minutes.  More than 50% of the time spent with Ms. Silverman on counseling / coordinating her care    Alda Santamaria MD

## 2019-04-01 NOTE — LETTER
4/1/2019       RE: Sola Silverman  [address] [place] [address]     Dear Colleague,    Thank you for referring your patient, Sola Silverman, to the [facility]  at [facility]. Please see a copy of my visit note below.    HPI  Patient is here for follow up. She reports that she is working with a therapist. She denies significant changes in her symptoms with exception of dry eyes.     Review of Systems     Constitutional:  Negative for fever, chills and fatigue.   Eyes:  Positive for eye irritation. Negative for pain, eye pain and decreased vision.   Respiratory:   Negative for cough and dyspnea on exertion.    Cardiovascular:  Negative for chest pain, dyspnea on exertion and edema.   Musculoskeletal:  Negative for back pain and arthralgias.   Skin:  Negative for itching, rash and flaky skin.   Endo/Heme:  Negative for anemia, swollen glands and bruises/bleeds easily.   Psychiatric/Behavioral:  Negative for depression, decreased concentration, mood swings and panic attacks.    Endocrine:  Negative for altered temperature regulation, polyphagia, polydipsia, unwanted hair growth and change in facial hair.    Current Outpatient Medications   Medication     aspirin 81 MG tablet     blood glucose monitoring (ACCU-CHEK COMPACT CARE KIT) meter device kit     blood glucose monitoring (ACCU-CHEK SMARTVIEW) test strip     Blood Glucose Monitoring Suppl ([employer] CONTOUR NEXT MONITOR) W/DEVICE KIT     Calcium-Vitamin D (CALCIUM + D PO)     Cetirizine HCl (ZYRTEC PO)     hydrochlorothiazide (HYDRODIURIL) 25 MG tablet     losartan (COZAAR) 100 MG tablet     metFORMIN (GLUCOPHAGE-XR) 500 MG 24 hr tablet     simvastatin (ZOCOR) 20 MG tablet     VITAMIN D, CHOLECALCIFEROL, PO     YUVAFEM 10 MCG TABS vaginal tablet     No current facility-administered medications for this visit.      Vitals:    04/01/19 0837 04/01/19 0838 04/01/19 0839 04/01/19 0840   BP:  "124/86 121/84 125/85 123/85   BP Location:    Left arm   Patient Position:    Chair   Pulse:    80   Weight:    120.4 kg (265 lb 6.4 oz)   Height:    1.715 m (5' 7.5\")         Physical Exam   Constitutional: She appears well-developed and well-nourished.   HENT:   Head: Normocephalic and atraumatic.   Eyes: Pupils are equal, round, and reactive to light.   Neck: Normal range of motion. Neck supple.   Cardiovascular: Normal rate.   Pulmonary/Chest: Effort normal.   Musculoskeletal: She exhibits no edema.   Skin: Skin is warm and dry.   Psychiatric: She has a normal mood and affect. Her behavior is normal. Judgment and thought content normal.   Nursing note and vitals reviewed.    Assessment and Plan:  Sola was seen today for follow up.    Diagnoses and all orders for this visit:    BRCA gene mutation positive. Reviewed breast cancer screening in the setting of high-risk genetic mutation. Recommend obtaining breast MRI.   -     MR Breast Bilateral w/o & w Contrast; Future    Essential hypertension, benign. Blood pressure is within acceptable range. Will continue with current medical therapy without changes.     Complicated grief. Patient is currently working with a therapist. She does not want to start medical therapy. Will monitor symptoms, consider serotonin specific reuptake inhibitor therapy as indicated.     Total time spent 25 minutes.  More than 50% of the time spent with Ms. Silverman on counseling / coordinating her care    Alda Santamaria MD    "

## 2019-04-01 NOTE — NURSING NOTE
Chief Complaint   Patient presents with     Follow Up     2 wk follow up BP       See BÁRBARA Killian 4/1/2019

## 2019-04-02 NOTE — PROGRESS NOTES
Progress Note    Client Name: Sola Silverman  Date: 4/1/19         Service Type: Individual      Session Start Time: 6  Session End Time: 6:45 pm      Session Length: 45 min     Session #: 21     Attendees: Client attended alone.    Treatment Plan Last Reviewed:  Due=7/1/19  Phq=7; renu=10. Next visit.    DATA      Progress Since Last Session (Related to Symptoms / Goals / Homework):   Symptoms: stable.    Homework: Achieved / completed to satisfaction read emdr handout (completed). Completed peaceful place picture.      Episode of Care Goals: Satisfactory progress - ACTION (Actively working towards change); Intervened by reinforcing change plan / affirming steps taken    Current / Ongoing Stressors and Concerns:  Rift with sister and brother in law. Grieving loss of father.           Treatment Objective(s) Addressed in This Session:   Client will use at least 3 coping skills for anxiety management in the next 3 weeks.               Client will identify steps toward managing grief.    Intervention:  Assessed functioning. Assessed for ptsd using the dsm-5: A=1 and 2; B=1,4,5; C=1; D=2,3,4,5,6,7; E=1,3,5. Identified the primary pos and neg cognition for her relationship with her sister. Educated on emdr. Used a guided meditation.      ASSESSMENT: Current Emotional / Mental Status (status of significant symptoms):   Risk status (Self / Other harm or suicidal ideation)   Client denies current fears or concerns for personal safety.   Client denies current or recent suicidal ideation or behaviors.   Client denies current or recent homicidal ideation or behaviors.   Client denies current or recent self injurious behavior or ideation.   Client denies other safety concerns.   Client Client reports there has been no change in risk factors since their last session.     Client Client reports there has been no change in protective factors since their last session.     A safety and  risk management plan has not been developed at this time, however client was given the after-hours number / 911 should there be a change in any of these risk factors.     Appearance:   Appropriate    Eye Contact:   Good    Psychomotor Behavior: Normal    Attitude:   Cooperative    Orientation:   All   Speech    Rate / Production: Normal     Volume:  Normal    Mood:    Normal   Affect:    Appropriate    Thought Content:  Clear    Thought Form:  Coherent  Logical    Insight:    Good      Medication Review:   No current psychiatric medications prescribed     Medication Compliance:   NA     Changes in Health Issues:   None reported     Chemical Use Review:   Substance Use: Chemical use reviewed, no active concerns identified      Tobacco Use: No current tobacco use.       Collateral Reports Completed:   Not Applicable    PLAN: (Client Tasks / Therapist Tasks / Other)  Schedule biweekly. Use emdr on sister issue before taking on loss of father and the guilt she feels concerning it.        Marcello Novak MS, LICSW                                                       ________________________________________________________________________    Treatment Plan    Client's Name: Sola Silverman  YOB: 1965    Date: 12/20/18    Diagnoses:            Adjustment Disorders  309.28 (F43.23) With mixed anxiety and depressed mood  Psychosocial & Contextual Factors: Death of father, estrangement from sister, limited support network  WHODAS: 23    Referral / Collaboration:  Referral to another professional/service is not indicated at this time. seeing me now for emdr.    Anticipated number of session or this episode of care: 10      MeasurableTreatment Goal(s) related to diagnosis / functional impairment(s)  Goal 1: Client will report coping better with past upsetting events.      Goal 2: Client will reprocess rift with sister until the negative cognition no longer feels true or upsetting.                New=3/25/19    Therapist will use emdr.            Client has reviewed and agreed to the above plan.      Marcello Novak [facility], MS 2/18/19

## 2019-04-19 DIAGNOSIS — I10 ESSENTIAL HYPERTENSION: ICD-10-CM

## 2019-04-19 RX ORDER — HYDROCHLOROTHIAZIDE 25 MG/1
25 TABLET ORAL DAILY
Qty: 90 TABLET | Refills: 3 | Status: SHIPPED | OUTPATIENT
Start: 2019-04-19 | End: 2019-11-08

## 2019-04-19 NOTE — TELEPHONE ENCOUNTER
Received refill request for hctz.  Last in clinic 4/2019 where BP was at goal and current medications were to be continued. Refill sent.

## 2019-04-22 ENCOUNTER — OFFICE VISIT (OUTPATIENT)
Dept: PSYCHOLOGY | Facility: CLINIC | Age: 54
End: 2019-04-22
Payer: COMMERCIAL

## 2019-04-22 DIAGNOSIS — F43.10 POSTTRAUMATIC STRESS DISORDER: Primary | ICD-10-CM

## 2019-04-22 PROCEDURE — 90834 PSYTX W PT 45 MINUTES: CPT | Performed by: SOCIAL WORKER

## 2019-04-22 ASSESSMENT — ANXIETY QUESTIONNAIRES
7. FEELING AFRAID AS IF SOMETHING AWFUL MIGHT HAPPEN: MORE THAN HALF THE DAYS
3. WORRYING TOO MUCH ABOUT DIFFERENT THINGS: MORE THAN HALF THE DAYS
5. BEING SO RESTLESS THAT IT IS HARD TO SIT STILL: NOT AT ALL
6. BECOMING EASILY ANNOYED OR IRRITABLE: SEVERAL DAYS
1. FEELING NERVOUS, ANXIOUS, OR ON EDGE: MORE THAN HALF THE DAYS
2. NOT BEING ABLE TO STOP OR CONTROL WORRYING: MORE THAN HALF THE DAYS
GAD7 TOTAL SCORE: 10

## 2019-04-22 ASSESSMENT — PATIENT HEALTH QUESTIONNAIRE - PHQ9
SUM OF ALL RESPONSES TO PHQ QUESTIONS 1-9: 10
5. POOR APPETITE OR OVEREATING: SEVERAL DAYS

## 2019-04-22 NOTE — PROGRESS NOTES
Progress Note    Client Name: Sola Silverman  Date: 4/22/19         Service Type: Individual      Session Start Time: 5  Session End Time: 5:45 pm      Session Length: 45 min     Session #: 22     Attendees: Client attended alone.    Treatment Plan Last Reviewed:  Due=7/1/19  Phq=10; ernu=10.      DATA      Progress Since Last Session (Related to Symptoms / Goals / Homework):   Symptoms: worsening.    Homework: Achieved / completed to satisfaction read emdr handout (completed). Completed peaceful place picture.      Episode of Care Goals: Satisfactory progress - ACTION (Actively working towards change); Intervened by reinforcing change plan / affirming steps taken    Current / Ongoing Stressors and Concerns:  Rift with sister and brother in law. Grieving loss of father.        Treatment Objective(s) Addressed in This Session: Client will use at least 3 coping skills for anxiety management in the next 3 weeks.               Client will identify steps toward managing grief.    Intervention:  Assessed functioning. Went over the results of the phq/renu. Used emdr to address rift with sister. She left reporting feeling better about the issue.      ASSESSMENT: Current Emotional / Mental Status (status of significant symptoms):   Risk status (Self / Other harm or suicidal ideation)   Client denies current fears or concerns for personal safety.   Client denies current or recent suicidal ideation or behaviors.   Client denies current or recent homicidal ideation or behaviors.   Client denies current or recent self injurious behavior or ideation.   Client denies other safety concerns.   Client Client reports there has been no change in risk factors since their last session.     Client Client reports there has been no change in protective factors since their last session.     A safety and risk management plan has not been developed at this time, however client was given the  after-hours number / 911 should there be a change in any of these risk factors.     Appearance:   Appropriate    Eye Contact:   Good    Psychomotor Behavior: Normal    Attitude:   Cooperative    Orientation:   All   Speech    Rate / Production: Normal     Volume:  Normal    Mood:    Normal   Affect:    Appropriate    Thought Content:  Clear    Thought Form:  Coherent  Logical    Insight:    Good      Medication Review:   No current psychiatric medications prescribed     Medication Compliance:   NA     Changes in Health Issues:   None reported     Chemical Use Review:   Substance Use: Chemical use reviewed, no active concerns identified      Tobacco Use: No current tobacco use.       Collateral Reports Completed:   Not Applicable    PLAN: (Client Tasks / Therapist Tasks / Other)  Schedule biweekly. Use emdr on sister issue before taking on loss of father and the guilt she feels concerning it.        Marcello Alvarezwick MS, LICSW                                                       ________________________________________________________________________    Treatment Plan    Client's Name: Sola Silverman  YOB: 1965    Date: 12/20/18    Diagnoses:            Adjustment Disorders  309.28 (F43.23) With mixed anxiety and depressed mood  Psychosocial & Contextual Factors: Death of father, estrangement from sister, limited support network  WHODAS: 23    Referral / Collaboration:  Referral to another professional/service is not indicated at this time. seeing me now for emdr.    Anticipated number of session or this episode of care: 10      MeasurableTreatment Goal(s) related to diagnosis / functional impairment(s)  Goal 1: Client will report coping better with past upsetting events.      Goal 2: Client will reprocess rift with sister until the negative cognition no longer feels true or upsetting.               New=3/25/19    Therapist will use emdr.            Client has reviewed and agreed to the above  plan.      Marcello Novak, [facility], MS 2/18/19

## 2019-04-23 ASSESSMENT — ANXIETY QUESTIONNAIRES: GAD7 TOTAL SCORE: 10

## 2019-04-29 ENCOUNTER — OFFICE VISIT (OUTPATIENT)
Dept: PSYCHOLOGY | Facility: CLINIC | Age: 54
End: 2019-04-29
Payer: COMMERCIAL

## 2019-04-29 DIAGNOSIS — F43.10 POSTTRAUMATIC STRESS DISORDER: ICD-10-CM

## 2019-04-29 DIAGNOSIS — F41.0 PANIC DISORDER WITHOUT AGORAPHOBIA: Primary | ICD-10-CM

## 2019-04-29 PROCEDURE — 90834 PSYTX W PT 45 MINUTES: CPT | Performed by: SOCIAL WORKER

## 2019-04-29 NOTE — PROGRESS NOTES
Progress Note    Client Name: Sola Silverman  Date: 19         Service Type: Individual      Session Start Time: 6  Session End Time: 6:45 pm      Session Length: 45 min     Session #: 23     Attendees: Client attended alone.    Treatment Plan Last Reviewed:  Due=19  Phq=10; renu=10.  Next visit.    DATA      Progress Since Last Session (Related to Symptoms / Goals / Homework):   Symptoms: stable.    Homework: Achieved / completed to satisfaction use distraction as needed if bothered by memories.      Episode of Care Goals: Satisfactory progress - ACTION (Actively working towards change); Intervened by reinforcing change plan / affirming steps taken    Current / Ongoing Stressors and Concerns:  Rift with sister and brother in law. Grieving loss of father. She was surprised to find herself no longer feeling much about her sister not coming to the hospital when dad ; feeling numb or indifferent about it. She left today reporting feeling good about the work she did. She is afraid of becoming numb to it all; like it would be a bad thing for her.       Treatment Objective(s) Addressed in This Session:   Client will use at least 3 coping skills for anxiety management in the next 3 weeks.               Client will identify steps toward managing grief.    Intervention:  Assessed functioning. Used emdr to address rift with sister. Used a guided meditation to end session; she felt calm.      ASSESSMENT: Current Emotional / Mental Status (status of significant symptoms):   Risk status (Self / Other harm or suicidal ideation)   Client denies current fears or concerns for personal safety.   Client denies current or recent suicidal ideation or behaviors.   Client denies current or recent homicidal ideation or behaviors.   Client denies current or recent self injurious behavior or ideation.   Client denies other safety concerns.   Client Client reports there has been no  change in risk factors since their last session.     Client Client reports there has been no change in protective factors since their last session.     A safety and risk management plan has not been developed at this time, however client was given the after-hours number / 911 should there be a change in any of these risk factors.     Appearance:   Appropriate    Eye Contact:   Good    Psychomotor Behavior: Normal    Attitude:   Cooperative    Orientation:   All   Speech    Rate / Production: Normal     Volume:  Normal    Mood:    Normal   Affect:    Appropriate    Thought Content:  Clear    Thought Form:  Coherent  Logical    Insight:    Good      Medication Review:   No current psychiatric medications prescribed     Medication Compliance:   NA     Changes in Health Issues:   None reported     Chemical Use Review:   Substance Use: Chemical use reviewed, no active concerns identified      Tobacco Use: No current tobacco use.       Collateral Reports Completed:   Not Applicable    PLAN: (Client Tasks / Therapist Tasks / Other)  Schedule biweekly. Use emdr on sister issue before taking on loss of father and the guilt she feels concerning it.        Marcello Kishore MS, LICSW                                                       ________________________________________________________________________    Treatment Plan    Client's Name: Sola Silverman  YOB: 1965    Date: 12/20/18    Diagnoses:            Adjustment Disorders  309.28 (F43.23) With mixed anxiety and depressed mood  Psychosocial & Contextual Factors: Death of father, estrangement from sister, limited support network  WHODAS: 23    Referral / Collaboration:  Referral to another professional/service is not indicated at this time. seeing me now for emdr.    Anticipated number of session or this episode of care: 10      MeasurableTreatment Goal(s) related to diagnosis / functional impairment(s)  Goal 1: Client will report coping better with  past upsetting events.      Goal 2: Client will reprocess rift with sister until the negative cognition no longer feels true or upsetting.               New=3/25/19    Therapist will use emdr.            Client has reviewed and agreed to the above plan.      Marcello Novak [facility], [place] 2/18/19

## 2019-05-06 ENCOUNTER — OFFICE VISIT (OUTPATIENT)
Dept: PSYCHOLOGY | Facility: CLINIC | Age: 54
End: 2019-05-06
Payer: COMMERCIAL

## 2019-05-06 DIAGNOSIS — F43.10 POSTTRAUMATIC STRESS DISORDER: Primary | ICD-10-CM

## 2019-05-06 PROCEDURE — 90834 PSYTX W PT 45 MINUTES: CPT | Performed by: SOCIAL WORKER

## 2019-05-06 ASSESSMENT — PATIENT HEALTH QUESTIONNAIRE - PHQ9
5. POOR APPETITE OR OVEREATING: NOT AT ALL
SUM OF ALL RESPONSES TO PHQ QUESTIONS 1-9: 5

## 2019-05-06 ASSESSMENT — ANXIETY QUESTIONNAIRES
7. FEELING AFRAID AS IF SOMETHING AWFUL MIGHT HAPPEN: SEVERAL DAYS
GAD7 TOTAL SCORE: 7
1. FEELING NERVOUS, ANXIOUS, OR ON EDGE: SEVERAL DAYS
2. NOT BEING ABLE TO STOP OR CONTROL WORRYING: MORE THAN HALF THE DAYS
3. WORRYING TOO MUCH ABOUT DIFFERENT THINGS: MORE THAN HALF THE DAYS
5. BEING SO RESTLESS THAT IT IS HARD TO SIT STILL: NOT AT ALL
6. BECOMING EASILY ANNOYED OR IRRITABLE: SEVERAL DAYS

## 2019-05-06 NOTE — PROGRESS NOTES
Progress Note    Client Name: Sola Silverman  Date: 5/6/19         Service Type: Individual      Session Start Time: 6  Session End Time: 6:45 pm      Session Length: 45 min     Session #: 24     Attendees: Client attended alone.    Treatment Plan Last Reviewed:  Due=7/1/19  Phq=5; renu=7.      DATA      Progress Since Last Session (Related to Symptoms / Goals / Homework):   Symptoms: improvement.    Homework: Achieved / completed to satisfaction use distraction as needed if bothered by memories.      Episode of Care Goals: Satisfactory progress - ACTION (Actively working towards change); Intervened by reinforcing change plan / affirming steps taken    Current / Ongoing Stressors and Concerns:  Rift with sister and brother in law. Grieving loss of father. She is not sure if she wants to leave room in her heart for her sister.       Treatment Objective(s) Addressed in This Session:   Client will use at least 3 coping skills for anxiety management in the next 3 weeks.               Client will identify steps toward managing grief.    Intervention:  Assessed functioning. Went over the results of the phq/renu. Processed feelings about sister; explored the dynamics of the relationship.      ASSESSMENT: Current Emotional / Mental Status (status of significant symptoms):   Risk status (Self / Other harm or suicidal ideation)   Client denies current fears or concerns for personal safety.   Client denies current or recent suicidal ideation or behaviors.   Client denies current or recent homicidal ideation or behaviors.   Client denies current or recent self injurious behavior or ideation.   Client denies other safety concerns.   Client Client reports there has been no change in risk factors since their last session.     Client Client reports there has been no change in protective factors since their last session.     A safety and risk management plan has not been developed at  this time, however client was given the after-hours number / 911 should there be a change in any of these risk factors.     Appearance:   Appropriate    Eye Contact:   Good    Psychomotor Behavior: Normal    Attitude:   Cooperative    Orientation:   All   Speech    Rate / Production: Normal     Volume:  Normal    Mood:    Normal   Affect:    Appropriate    Thought Content:  Clear    Thought Form:  Coherent  Logical    Insight:    Good      Medication Review:   No current psychiatric medications prescribed     Medication Compliance:   NA     Changes in Health Issues:   None reported     Chemical Use Review:   Substance Use: Chemical use reviewed, no active concerns identified      Tobacco Use: No current tobacco use.       Collateral Reports Completed:   Not Applicable    PLAN: (Client Tasks / Therapist Tasks / Other)  Schedule biweekly. Use emdr on sister issue before taking on loss of father and the guilt she feels concerning it.        Marcello Kishore MS, LICSW                                                       ________________________________________________________________________    Treatment Plan    Client's Name: Sola Silverman  YOB: 1965    Date: 12/20/18    Diagnoses:            Adjustment Disorders  309.28 (F43.23) With mixed anxiety and depressed mood  Psychosocial & Contextual Factors: Death of father, estrangement from sister, limited support network  WHODAS: 23    Referral / Collaboration:  Referral to another professional/service is not indicated at this time. seeing me now for emdr.    Anticipated number of session or this episode of care: 10      MeasurableTreatment Goal(s) related to diagnosis / functional impairment(s)  Goal 1: Client will report coping better with past upsetting events.      Goal 2: Client will reprocess rift with sister until the negative cognition no longer feels true or upsetting.               New=3/25/19    Therapist will use emdr.            Client has  reviewed and agreed to the above plan.      Marcello Novak, [facility], [place] 2/18/19

## 2019-05-07 ASSESSMENT — ANXIETY QUESTIONNAIRES: GAD7 TOTAL SCORE: 7

## 2019-05-20 ENCOUNTER — OFFICE VISIT (OUTPATIENT)
Dept: PSYCHOLOGY | Facility: CLINIC | Age: 54
End: 2019-05-20
Payer: COMMERCIAL

## 2019-05-20 DIAGNOSIS — F43.10 POSTTRAUMATIC STRESS DISORDER: Primary | ICD-10-CM

## 2019-05-20 PROCEDURE — 90834 PSYTX W PT 45 MINUTES: CPT | Performed by: SOCIAL WORKER

## 2019-05-20 ASSESSMENT — ANXIETY QUESTIONNAIRES
6. BECOMING EASILY ANNOYED OR IRRITABLE: SEVERAL DAYS
3. WORRYING TOO MUCH ABOUT DIFFERENT THINGS: MORE THAN HALF THE DAYS
5. BEING SO RESTLESS THAT IT IS HARD TO SIT STILL: NOT AT ALL
2. NOT BEING ABLE TO STOP OR CONTROL WORRYING: MORE THAN HALF THE DAYS
GAD7 TOTAL SCORE: 10
7. FEELING AFRAID AS IF SOMETHING AWFUL MIGHT HAPPEN: MORE THAN HALF THE DAYS
1. FEELING NERVOUS, ANXIOUS, OR ON EDGE: MORE THAN HALF THE DAYS

## 2019-05-20 ASSESSMENT — PATIENT HEALTH QUESTIONNAIRE - PHQ9
SUM OF ALL RESPONSES TO PHQ QUESTIONS 1-9: 8
5. POOR APPETITE OR OVEREATING: SEVERAL DAYS

## 2019-05-21 ASSESSMENT — ANXIETY QUESTIONNAIRES: GAD7 TOTAL SCORE: 10

## 2019-05-21 NOTE — PROGRESS NOTES
Progress Note    Client Name: Sola Silverman  Date: 5/20/19         Service Type: Individual      Session Start Time: 6  Session End Time: 6:45 pm      Session Length: 45 min     Session #: 25     Attendees: Client attended alone.    Treatment Plan Last Reviewed:  Due=7/1/19  Phq=8; ernu=10.      DATA      Progress Since Last Session (Related to Symptoms / Goals / Homework):   Symptoms: worsening.    Homework: Achieved / completed to satisfaction use distraction as needed if bothered by memories.      Episode of Care Goals: Satisfactory progress - ACTION (Actively working towards change); Intervened by reinforcing change plan / affirming steps taken    Current / Ongoing Stressors and Concerns:  Rift with sister and brother in law. Grieving loss of father. She is not sure if she wants to leave room in her heart for her sister.       Treatment Objective(s) Addressed in This Session:   Client will use at least 3 coping skills for anxiety management in the next 3 weeks.               Client will reprocess the negative cognition related to family loss.    Intervention:  Assessed functioning. Went over the results of the phq/renu. Used emdr to address rift with sister. Used a guided meditation to end session. She didn't think it would help her feel relaxed and was pleasantly surprised that it did make her feel relaxed.      ASSESSMENT: Current Emotional / Mental Status (status of significant symptoms):   Risk status (Self / Other harm or suicidal ideation)   Client denies current fears or concerns for personal safety.   Client denies current or recent suicidal ideation or behaviors.   Client denies current or recent homicidal ideation or behaviors.   Client denies current or recent self injurious behavior or ideation.   Client denies other safety concerns.   Client Client reports there has been no change in risk factors since their last session.     Client Client reports there  has been no change in protective factors since their last session.     A safety and risk management plan has not been developed at this time, however client was given the after-hours number / 911 should there be a change in any of these risk factors.     Appearance:   Appropriate    Eye Contact:   Good    Psychomotor Behavior: Normal    Attitude:   Cooperative    Orientation:   All   Speech    Rate / Production: Normal     Volume:  Normal    Mood:    Normal, sad   Affect:    Appropriate    Thought Content:  Clear    Thought Form:  Coherent  Logical    Insight:    Good      Medication Review:   No current psychiatric medications prescribed     Medication Compliance:   NA     Changes in Health Issues:   None reported     Chemical Use Review:   Substance Use: Chemical use reviewed, no active concerns identified      Tobacco Use: No current tobacco use.       Collateral Reports Completed:   Not Applicable    PLAN: (Client Tasks / Therapist Tasks / Other)  Schedule biweekly. Use emdr on sister issue before taking on loss of father and the guilt she feels concerning it.  Goals due 7/1/19.      Marcello Kishore MS, LICSW                                                       ________________________________________________________________________    Treatment Plan    Client's Name: Sola Silverman  YOB: 1965    Date: 12/20/18    Diagnoses:            Adjustment Disorders  309.28 (F43.23) With mixed anxiety and depressed mood  Psychosocial & Contextual Factors: Death of father, estrangement from sister, limited support network  WHODAS: 23    Referral / Collaboration:  Referral to another professional/service is not indicated at this time. seeing me now for emdr.    Anticipated number of session or this episode of care: 10      MeasurableTreatment Goal(s) related to diagnosis / functional impairment(s)  Goal 1: Client will report coping better with past upsetting events.      Goal 2: Client will reprocess  rift with sister until the negative cognition no longer feels true or upsetting.               New=3/25/19    Therapist will use emdr.            Client has reviewed and agreed to the above plan.      Marcello [facility] [place] 2/18/19

## 2019-06-17 ENCOUNTER — OFFICE VISIT (OUTPATIENT)
Dept: PSYCHOLOGY | Facility: CLINIC | Age: 54
End: 2019-06-17
Payer: COMMERCIAL

## 2019-06-17 DIAGNOSIS — F43.10 POSTTRAUMATIC STRESS DISORDER: Primary | ICD-10-CM

## 2019-06-17 PROCEDURE — 90834 PSYTX W PT 45 MINUTES: CPT | Performed by: SOCIAL WORKER

## 2019-06-17 ASSESSMENT — ANXIETY QUESTIONNAIRES
7. FEELING AFRAID AS IF SOMETHING AWFUL MIGHT HAPPEN: MORE THAN HALF THE DAYS
2. NOT BEING ABLE TO STOP OR CONTROL WORRYING: SEVERAL DAYS
1. FEELING NERVOUS, ANXIOUS, OR ON EDGE: MORE THAN HALF THE DAYS
3. WORRYING TOO MUCH ABOUT DIFFERENT THINGS: MORE THAN HALF THE DAYS
6. BECOMING EASILY ANNOYED OR IRRITABLE: SEVERAL DAYS
GAD7 TOTAL SCORE: 8
5. BEING SO RESTLESS THAT IT IS HARD TO SIT STILL: NOT AT ALL

## 2019-06-17 ASSESSMENT — PATIENT HEALTH QUESTIONNAIRE - PHQ9
SUM OF ALL RESPONSES TO PHQ QUESTIONS 1-9: 7
5. POOR APPETITE OR OVEREATING: NOT AT ALL

## 2019-06-18 ASSESSMENT — ANXIETY QUESTIONNAIRES: GAD7 TOTAL SCORE: 8

## 2019-06-18 NOTE — PROGRESS NOTES
Progress Note    Client Name: Sola Silverman  Date: 19         Service Type: Individual      Session Start Time: 6  Session End Time: 6:45 pm      Session Length: 45 min     Session #: 26     Attendees: Client attended alone.    Treatment Plan Last Reviewed:  Due=19  Phq=7; renu=8.      DATA      Progress Since Last Session (Related to Symptoms / Goals / Homework):   Symptoms: worsening.    Homework: Achieved / completed to satisfaction use distraction as needed if bothered by memories.      Episode of Care Goals: Satisfactory progress - ACTION (Actively working towards change); Intervened by reinforcing change plan / affirming steps taken    Current / Ongoing Stressors and Concerns:  Rift with sister and brother in law. Grieving loss of father. She is not sure if she wants to leave room in her heart for her sister. Today she was able to process the death of her father and did better than she thought she could.       Treatment Objective(s) Addressed in This Session:   Client will use at least 3 coping skills for anxiety management in the next 3 weeks.               Client will reprocess the negative cognition related to family loss.    Intervention:  Assessed functioning. Went over the results of the phq/renu. Used emdr to address sister not being there when their father . Used a guided meditation to end session.        ASSESSMENT: Current Emotional / Mental Status (status of significant symptoms):   Risk status (Self / Other harm or suicidal ideation)   Client denies current fears or concerns for personal safety.   Client denies current or recent suicidal ideation or behaviors.   Client denies current or recent homicidal ideation or behaviors.   Client denies current or recent self injurious behavior or ideation.   Client denies other safety concerns.   Client Client reports there has been no change in risk factors since their last session.     Client  Client reports there has been no change in protective factors since their last session.     A safety and risk management plan has not been developed at this time, however client was given the after-hours number / 911 should there be a change in any of these risk factors.     Appearance:   Appropriate    Eye Contact:   Good    Psychomotor Behavior: Normal    Attitude:   Cooperative    Orientation:   All   Speech    Rate / Production: Normal     Volume:  Normal    Mood:    Normal, sad   Affect:    Appropriate    Thought Content:  Clear    Thought Form:  Coherent  Logical    Insight:    Good      Medication Review:   No current psychiatric medications prescribed     Medication Compliance:   NA     Changes in Health Issues:   None reported     Chemical Use Review:   Substance Use: Chemical use reviewed, no active concerns identified      Tobacco Use: No current tobacco use.       Collateral Reports Completed:   Not Applicable    PLAN: (Client Tasks / Therapist Tasks / Other)  Schedule biweekly. Use emdr on sister issue/loss of father and the guilt she feels concerning it.  Goals due 7/1/19.      Marcello Novak MS, LICSW                                                       ________________________________________________________________________    Treatment Plan    Client's Name: Sola Silverman  YOB: 1965    Date: 12/20/18    Diagnoses:            Adjustment Disorders  309.28 (F43.23) With mixed anxiety and depressed mood  Psychosocial & Contextual Factors: Death of father, estrangement from sister, limited support network  WHODAS: 23    Referral / Collaboration:  Referral to another professional/service is not indicated at this time. seeing me now for emdr.    Anticipated number of session or this episode of care: 10      MeasurableTreatment Goal(s) related to diagnosis / functional impairment(s)  Goal 1: Client will report coping better with past upsetting events.      Goal 2: Client will reprocess  rift with sister until the negative cognition no longer feels true or upsetting.               New=3/25/19    Therapist will use emdr.            Client has reviewed and agreed to the above plan.      [facility] [place] 2/18/[facility]

## 2019-06-24 ENCOUNTER — OFFICE VISIT (OUTPATIENT)
Dept: PSYCHOLOGY | Facility: CLINIC | Age: 54
End: 2019-06-24
Payer: COMMERCIAL

## 2019-06-24 DIAGNOSIS — F43.10 POSTTRAUMATIC STRESS DISORDER: Primary | ICD-10-CM

## 2019-06-24 PROCEDURE — 90834 PSYTX W PT 45 MINUTES: CPT | Performed by: SOCIAL WORKER

## 2019-06-24 NOTE — PROGRESS NOTES
Progress Note    Client Name: Sola Silverman  Date: 19         Service Type: Individual      Session Start Time: 6  Session End Time: 6:45 pm      Session Length: 45 min     Session #: 27     Attendees: Client attended alone.    Treatment Plan Last Reviewed:  Due=19  Phq=7; renu=8.      DATA      Progress Since Last Session (Related to Symptoms / Goals / Homework):   Symptoms: stable.    Homework: Achieved / completed to satisfaction use distraction as needed if bothered by memories.      Episode of Care Goals: Satisfactory progress - ACTION (Actively working towards change); Intervened by reinforcing change plan / affirming steps taken    Current / Ongoing Stressors and Concerns:  Rift with sister and brother in law. Grieving loss of father. She is not sure if she wants to leave room in her heart for her sister. Today she was able to process the death of her father and did better than she thought she could. She felt less guilty than she thought she would about not taking father to hospital when he did not want to go.       Treatment Objective(s) Addressed in This Session:   Client will use at least 3 coping skills for anxiety management in the next 3 weeks.               Client will reprocess the negative cognition related to family loss.    Intervention:  Assessed functioning. Reviewed goals. Used emdr to address sister not being there when their father .          ASSESSMENT: Current Emotional / Mental Status (status of significant symptoms):   Risk status (Self / Other harm or suicidal ideation)   Client denies current fears or concerns for personal safety.   Client denies current or recent suicidal ideation or behaviors.   Client denies current or recent homicidal ideation or behaviors.   Client denies current or recent self injurious behavior or ideation.   Client denies other safety concerns.   Client Client reports there has been no change in risk  factors since their last session.     Client Client reports there has been no change in protective factors since their last session.     A safety and risk management plan has not been developed at this time, however client was given the after-hours number / 911 should there be a change in any of these risk factors.     Appearance:   Appropriate    Eye Contact:   Good    Psychomotor Behavior: Normal    Attitude:   Cooperative    Orientation:   All   Speech    Rate / Production: Normal     Volume:  Normal    Mood:    Normal, sad   Affect:    Appropriate    Thought Content:  Clear    Thought Form:  Coherent  Logical    Insight:    Good      Medication Review:   No current psychiatric medications prescribed     Medication Compliance:   NA     Changes in Health Issues:   None reported     Chemical Use Review:   Substance Use: Chemical use reviewed, no active concerns identified      Tobacco Use: No current tobacco use.       Collateral Reports Completed:   Not Applicable    PLAN: (Client Tasks / Therapist Tasks / Other)  Schedule biweekly. Use emdr on sister issue/loss of father and the guilt she feels concerning it.  Goals due 9/24/19.      Marcello Kishore MS, LICSW                                                       ________________________________________________________________________    Treatment Plan    Client's Name: Sola Silverman  YOB: 1965    Date: 12/20/18    Diagnoses:            Adjustment Disorders  309.28 (F43.23) With mixed anxiety and depressed mood  Psychosocial & Contextual Factors: Death of father, estrangement from sister, limited support network  WHODAS: 23    Referral / Collaboration:  Referral to another professional/service is not indicated at this time. seeing me now for emdr.    Anticipated number of session or this episode of care: 10+      MeasurableTreatment Goal(s) related to diagnosis / functional impairment(s)  Goal 1: Client will report coping better with past  upsetting events.      Goal 2: Client will reprocess rift with sister until the negative cognition no longer feels true or upsetting.               New=3/25/19    Therapist will use emdr.            Client has reviewed and agreed to the above plan.      Marcello Novak [facility], [place] 2/18/19

## 2019-07-08 ENCOUNTER — OFFICE VISIT (OUTPATIENT)
Dept: PSYCHOLOGY | Facility: CLINIC | Age: 54
End: 2019-07-08
Payer: COMMERCIAL

## 2019-07-08 DIAGNOSIS — F43.10 POSTTRAUMATIC STRESS DISORDER: Primary | ICD-10-CM

## 2019-07-08 PROCEDURE — 90834 PSYTX W PT 45 MINUTES: CPT | Performed by: SOCIAL WORKER

## 2019-07-08 ASSESSMENT — ANXIETY QUESTIONNAIRES
2. NOT BEING ABLE TO STOP OR CONTROL WORRYING: SEVERAL DAYS
7. FEELING AFRAID AS IF SOMETHING AWFUL MIGHT HAPPEN: MORE THAN HALF THE DAYS
GAD7 TOTAL SCORE: 8
1. FEELING NERVOUS, ANXIOUS, OR ON EDGE: SEVERAL DAYS
5. BEING SO RESTLESS THAT IT IS HARD TO SIT STILL: NOT AT ALL
6. BECOMING EASILY ANNOYED OR IRRITABLE: SEVERAL DAYS
3. WORRYING TOO MUCH ABOUT DIFFERENT THINGS: MORE THAN HALF THE DAYS

## 2019-07-08 ASSESSMENT — PATIENT HEALTH QUESTIONNAIRE - PHQ9
SUM OF ALL RESPONSES TO PHQ QUESTIONS 1-9: 7
5. POOR APPETITE OR OVEREATING: SEVERAL DAYS

## 2019-07-08 NOTE — PROGRESS NOTES
Progress Note    Client Name: Sola Silverman  Date: 7/8/19         Service Type: Individual      Session Start Time: 6  Session End Time: 6:45 pm      Session Length: 45 min     Session #: 28     Attendees: Client attended alone.    Treatment Plan Last Reviewed:  Due=9/24/19  Phq=7; renu=8.      DATA      Progress Since Last Session (Related to Symptoms / Goals / Homework):   Symptoms: same.    Homework: Achieved / completed to satisfaction use distraction as needed if bothered by memories.      Episode of Care Goals: Satisfactory progress - ACTION (Actively working towards change); Intervened by reinforcing change plan / affirming steps taken    Current / Ongoing Stressors and Concerns:  Rift with sister and brother in law. Grieving loss of father. Considering getting 2 cats to help with feeling of loneliness.       Treatment Objective(s) Addressed in This Session:   Client will use at least 3 coping skills for anxiety management in the next 3 weeks.               Client will reprocess the negative cognition related to family loss.    Intervention:  Assessed functioning. Went over the results of the phq/renu. Used emdr to address loss of father and feeling of loneliness. Problem solved- cat idea came up.          ASSESSMENT: Current Emotional / Mental Status (status of significant symptoms):   Risk status (Self / Other harm or suicidal ideation)   Client denies current fears or concerns for personal safety.   Client denies current or recent suicidal ideation or behaviors.   Client denies current or recent homicidal ideation or behaviors.   Client denies current or recent self injurious behavior or ideation.   Client denies other safety concerns.   Client Client reports there has been no change in risk factors since their last session.     Client Client reports there has been no change in protective factors since their last session.     A safety and risk management plan  has not been developed at this time, however client was given the after-hours number / 911 should there be a change in any of these risk factors.     Appearance:   Appropriate    Eye Contact:   Good    Psychomotor Behavior: Normal    Attitude:   Cooperative    Orientation:   All   Speech    Rate / Production: Normal     Volume:  Normal    Mood:    Normal   Affect:    Appropriate    Thought Content:  Clear    Thought Form:  Coherent  Logical    Insight:    Good      Medication Review:   No current psychiatric medications prescribed     Medication Compliance:   NA     Changes in Health Issues:   None reported     Chemical Use Review:   Substance Use: Chemical use reviewed, no active concerns identified      Tobacco Use: No current tobacco use.       Collateral Reports Completed:   Not Applicable    PLAN: (Client Tasks / Therapist Tasks / Other)  Schedule biweekly. Use emdr on sister issue/loss of father and the guilt she feels concerning it.  Goals due 9/24/19.      Marcello Novak MS, LICSW                                                       ________________________________________________________________________    Treatment Plan    Client's Name: Sola Silverman  YOB: 1965    Date: 12/20/18    Diagnoses:            Adjustment Disorders  309.28 (F43.23) With mixed anxiety and depressed mood  Psychosocial & Contextual Factors: Death of father, estrangement from sister, limited support network  WHODAS: 23    Referral / Collaboration:  Referral to another professional/service is not indicated at this time. seeing me now for emdr.    Anticipated number of session or this episode of care: 10+      MeasurableTreatment Goal(s) related to diagnosis / functional impairment(s)  Goal 1: Client will report coping better with past upsetting events.      Goal 2: Client will reprocess rift with sister until the negative cognition no longer feels true or upsetting.               New=3/25/19    Therapist will use  emdr.            Client has reviewed and agreed to the above plan.      Marcello Novak, [facility], MS 2/18/19

## 2019-07-09 ASSESSMENT — ANXIETY QUESTIONNAIRES: GAD7 TOTAL SCORE: 8

## 2019-07-31 ENCOUNTER — ANCILLARY PROCEDURE (OUTPATIENT)
Dept: MRI IMAGING | Facility: CLINIC | Age: 54
End: 2019-07-31
Attending: INTERNAL MEDICINE
Payer: COMMERCIAL

## 2019-07-31 DIAGNOSIS — Z15.01 BRCA GENE MUTATION POSITIVE: ICD-10-CM

## 2019-07-31 DIAGNOSIS — R92.8 ABNORMAL FINDING ON BREAST IMAGING: ICD-10-CM

## 2019-07-31 DIAGNOSIS — Z15.09 BRCA GENE MUTATION POSITIVE: ICD-10-CM

## 2019-07-31 RX ORDER — GADOBUTROL 604.72 MG/ML
15 INJECTION INTRAVENOUS ONCE
Status: COMPLETED | OUTPATIENT
Start: 2019-07-31 | End: 2019-07-31

## 2019-07-31 RX ADMIN — GADOBUTROL 12 ML: 604.72 INJECTION INTRAVENOUS at 18:28

## 2019-07-31 NOTE — DISCHARGE INSTRUCTIONS
MRI Contrast Discharge Instructions    The IV contrast you received today will pass out of your body in your  urine. This will happen in the next 24 hours. You will not feel this process.  Your urine will not change color.    Drink at least 4 extra glasses of water or juice today (unless your doctor  has restricted your fluids). This reduces the stress on your kidneys.  You may take your regular medicines.    If you are on dialysis: It is best to have dialysis today.    If you have a reaction: Most reactions happen right away. If you have  any new symptoms after leaving the hospital (such as hives or swelling),  call your hospital at the correct number below. Or call your family doctor.  If you have breathing distress or wheezing, call 911.    Special instructions: ***    I have read and understand the above information.    Signature:______________________________________ Date:___________    Staff:__________________________________________ Date:___________     Time:__________    [place] [facility] Departments:    ___[place]: [phone]  ___[facility]: [phone]  ___[place]: [phone] ___[facility]: [phone]  ___[facility]: [phone]  ___[facility]: [phone]  ___[phone]  ___[facility]: [phone]  ___[phone]

## 2019-08-09 ENCOUNTER — OFFICE VISIT (OUTPATIENT)
Dept: INTERNAL MEDICINE | Facility: CLINIC | Age: 54
End: 2019-08-09
Attending: INTERNAL MEDICINE
Payer: COMMERCIAL

## 2019-08-09 VITALS — SYSTOLIC BLOOD PRESSURE: 131 MMHG | HEART RATE: 80 BPM | DIASTOLIC BLOOD PRESSURE: 87 MMHG

## 2019-08-09 DIAGNOSIS — R10.31 RIGHT LOWER QUADRANT PAIN: Primary | ICD-10-CM

## 2019-08-09 DIAGNOSIS — E11.9 TYPE 2 DIABETES MELLITUS WITHOUT COMPLICATION, WITHOUT LONG-TERM CURRENT USE OF INSULIN (H): ICD-10-CM

## 2019-08-09 LAB
ALBUMIN SERPL-MCNC: 4 G/DL (ref 3.4–5)
ALP SERPL-CCNC: 76 U/L (ref 40–150)
ALT SERPL W P-5'-P-CCNC: 57 U/L (ref 0–50)
ANION GAP SERPL CALCULATED.3IONS-SCNC: 8 MMOL/L (ref 3–14)
AST SERPL W P-5'-P-CCNC: 37 U/L (ref 0–45)
BILIRUB DIRECT SERPL-MCNC: 0.2 MG/DL (ref 0–0.2)
BILIRUB SERPL-MCNC: 0.5 MG/DL (ref 0.2–1.3)
BUN SERPL-MCNC: 13 MG/DL (ref 7–30)
CALCIUM SERPL-MCNC: 9.6 MG/DL (ref 8.5–10.1)
CHLORIDE SERPL-SCNC: 101 MMOL/L (ref 94–109)
CHOLEST SERPL-MCNC: 130 MG/DL
CO2 SERPL-SCNC: 29 MMOL/L (ref 20–32)
CREAT SERPL-MCNC: 0.84 MG/DL (ref 0.52–1.04)
GFR SERPL CREATININE-BSD FRML MDRD: 79 ML/MIN/{1.73_M2}
GLUCOSE SERPL-MCNC: 143 MG/DL (ref 70–99)
HBA1C MFR BLD: 7.2 % (ref 0–5.6)
HDLC SERPL-MCNC: 58 MG/DL
LDLC SERPL CALC-MCNC: 53 MG/DL
NONHDLC SERPL-MCNC: 72 MG/DL
POTASSIUM SERPL-SCNC: 4 MMOL/L (ref 3.4–5.3)
PROT SERPL-MCNC: 7.9 G/DL (ref 6.8–8.8)
SODIUM SERPL-SCNC: 138 MMOL/L (ref 133–144)
TRIGL SERPL-MCNC: 95 MG/DL

## 2019-08-09 PROCEDURE — 80048 BASIC METABOLIC PNL TOTAL CA: CPT | Performed by: INTERNAL MEDICINE

## 2019-08-09 PROCEDURE — 80076 HEPATIC FUNCTION PANEL: CPT | Performed by: INTERNAL MEDICINE

## 2019-08-09 PROCEDURE — 80061 LIPID PANEL: CPT | Performed by: INTERNAL MEDICINE

## 2019-08-09 PROCEDURE — 36415 COLL VENOUS BLD VENIPUNCTURE: CPT | Performed by: INTERNAL MEDICINE

## 2019-08-09 PROCEDURE — G0463 HOSPITAL OUTPT CLINIC VISIT: HCPCS | Mod: ZF

## 2019-08-09 PROCEDURE — 83036 HEMOGLOBIN GLYCOSYLATED A1C: CPT | Performed by: INTERNAL MEDICINE

## 2019-08-09 NOTE — LETTER
8/9/2019       RE: Sola Silverman  [address] [place] [address]     Dear Colleague,    Thank you for referring your patient, Sola Silverman, to the [facility]  at [facility]. Please see a copy of my visit note below.    HPI  Patient is here for follow up on hypertension and evaluation of abdominal pain. She reports that she has had pain in the right lower quadrant for some time. She also reports mild nausea occasionally. Pain has been intermittent in the past. She states that she is planning to have breast biopsy next week and currently considering further options for management of breast cancer risk.     Review of Systems     Constitutional:  Negative for fever, chills and fatigue.   HENT:  Negative for dry mouth and sinus congestion.    Respiratory:   Negative for cough and dyspnea on exertion.    Cardiovascular:  Negative for chest pain, dyspnea on exertion and edema.   Gastrointestinal:  Positive for abdominal pain and diarrhea. Negative for nausea, vomiting, constipation, melena and bloating.   Skin:  Negative for itching and rash.   Neurological:  Negative for tingling, weakness and paralysis.   Endo/Heme:  Negative for anemia, swollen glands and bruises/bleeds easily.   Psychiatric/Behavioral:  Negative for depression, decreased concentration, mood swings and panic attacks.    Endocrine:  Negative for altered temperature regulation, polyphagia, polydipsia, unwanted hair growth and change in facial hair.    Current Outpatient Medications   Medication     aspirin 81 MG tablet     blood glucose monitoring (ACCU-CHEK COMPACT CARE KIT) meter device kit     blood glucose monitoring (ACCU-CHEK SMARTVIEW) test strip     Blood Glucose Monitoring Suppl (DIANELYS CONTOUR NEXT MONITOR) W/DEVICE KIT     Calcium-Vitamin D (CALCIUM + D PO)     Cetirizine HCl (ZYRTEC PO)     hydrochlorothiazide (HYDRODIURIL) 25 MG tablet     losartan (COZAAR) 100 MG  tablet     metFORMIN (GLUCOPHAGE-XR) 500 MG 24 hr tablet     simvastatin (ZOCOR) 20 MG tablet     VITAMIN D, CHOLECALCIFEROL, PO     YUVAFEM 10 MCG TABS vaginal tablet     No current facility-administered medications for this visit.      Vitals:    08/09/19 0924 08/09/19 0933 08/09/19 0934 08/09/19 0935   BP: 130/86 139/85 121/85 131/87   Pulse: 80        Physical Exam   Constitutional: She appears well-developed and well-nourished.   HENT:   Head: Normocephalic and atraumatic.   Eyes: Pupils are equal, round, and reactive to light. EOM are normal.   Neck: Normal range of motion. Neck supple.   Cardiovascular: Normal rate and regular rhythm.   Pulmonary/Chest: Effort normal.   Abdominal: Soft. Bowel sounds are normal. There is tenderness (tenderness on palpation - right lower quadrant).   Musculoskeletal: She exhibits no edema.   Skin: Skin is warm and dry.   Psychiatric: She has a normal mood and affect. Her behavior is normal. Judgment and thought content normal.   Nursing note and vitals reviewed.    Assessment and Plan:    Sola was seen today for follow up.    Diagnoses and all orders for this visit:    Right lower quadrant pain.  Reviewed prior imaging with patient.  Recommend repeating CT of the abdomen for evaluation.  Patient will be contacted with test results accordingly.  -     CT Abdomen w/Contrast    Type 2 diabetes mellitus without complication, without long-term current use of insulin (H).  Discussed glycemic control goals with patient.  Recommend checking hemoglobin A1c as well as lipids today.  Patient will be contacted with test results and further recommendations.  -     Hgb A1c  -     Basic Metabolic Panel  -     Lipid Profile  -     Hepatic Panel    Total time spent 25 minutes.  More than 50% of the time spent with Ms. Silverman on counseling / coordinating her care    Alda Santamaria MD

## 2019-08-09 NOTE — PROGRESS NOTES
HPI  Patient is here for follow up on hypertension and evaluation of abdominal pain. She reports that she has had pain in the right lower quadrant for some time. She also reports mild nausea occasionally. Pain has been intermittent in the past. She states that she is planning to have breast biopsy next week and currently considering further options for management of breast cancer risk.     Review of Systems     Constitutional:  Negative for fever, chills and fatigue.   HENT:  Negative for dry mouth and sinus congestion.    Respiratory:   Negative for cough and dyspnea on exertion.    Cardiovascular:  Negative for chest pain, dyspnea on exertion and edema.   Gastrointestinal:  Positive for abdominal pain and diarrhea. Negative for nausea, vomiting, constipation, melena and bloating.   Skin:  Negative for itching and rash.   Neurological:  Negative for tingling, weakness and paralysis.   Endo/Heme:  Negative for anemia, swollen glands and bruises/bleeds easily.   Psychiatric/Behavioral:  Negative for depression, decreased concentration, mood swings and panic attacks.    Endocrine:  Negative for altered temperature regulation, polyphagia, polydipsia, unwanted hair growth and change in facial hair.    Current Outpatient Medications   Medication     aspirin 81 MG tablet     blood glucose monitoring (ACCU-CHEK COMPACT CARE KIT) meter device kit     blood glucose monitoring (ACCU-CHEK SMARTVIEW) test strip     Blood Glucose Monitoring Suppl (DIANELYS CONTOUR NEXT MONITOR) W/DEVICE KIT     Calcium-Vitamin D (CALCIUM + D PO)     Cetirizine HCl (ZYRTEC PO)     hydrochlorothiazide (HYDRODIURIL) 25 MG tablet     losartan (COZAAR) 100 MG tablet     metFORMIN (GLUCOPHAGE-XR) 500 MG 24 hr tablet     simvastatin (ZOCOR) 20 MG tablet     VITAMIN D, CHOLECALCIFEROL, PO     YUVAFEM 10 MCG TABS vaginal tablet     No current facility-administered medications for this visit.      Vitals:    08/09/19 0924 08/09/19 0933 08/09/19 0934 08/09/19  0935   BP: 130/86 139/85 121/85 131/87   Pulse: 80            Physical Exam   Constitutional: She appears well-developed and well-nourished.   HENT:   Head: Normocephalic and atraumatic.   Eyes: Pupils are equal, round, and reactive to light. EOM are normal.   Neck: Normal range of motion. Neck supple.   Cardiovascular: Normal rate and regular rhythm.   Pulmonary/Chest: Effort normal.   Abdominal: Soft. Bowel sounds are normal. There is tenderness (tenderness on palpation - right lower quadrant).   Musculoskeletal: She exhibits no edema.   Skin: Skin is warm and dry.   Psychiatric: She has a normal mood and affect. Her behavior is normal. Judgment and thought content normal.   Nursing note and vitals reviewed.    Assessment and Plan:      Sola was seen today for follow up.    Diagnoses and all orders for this visit:    Right lower quadrant pain.  Reviewed prior imaging with patient.  Recommend repeating CT of the abdomen for evaluation.  Patient will be contacted with test results accordingly.  -     CT Abdomen w/Contrast    Type 2 diabetes mellitus without complication, without long-term current use of insulin (H).  Discussed glycemic control goals with patient.  Recommend checking hemoglobin A1c as well as lipids today.  Patient will be contacted with test results and further recommendations.  -     Hgb A1c  -     Basic Metabolic Panel  -     Lipid Profile  -     Hepatic Panel    Total time spent 25 minutes.  More than 50% of the time spent with Ms. Silverman on counseling / coordinating her care    Alda Santamaria MD

## 2019-08-10 PROBLEM — E11.9 TYPE 2 DIABETES MELLITUS WITHOUT COMPLICATION, WITHOUT LONG-TERM CURRENT USE OF INSULIN (H): Status: ACTIVE | Noted: 2019-08-10

## 2019-08-10 ASSESSMENT — ENCOUNTER SYMPTOMS
DECREASED CONCENTRATION: 0
ALTERED TEMPERATURE REGULATION: 0
WEAKNESS: 0
PANIC: 0
CHILLS: 0
POLYDIPSIA: 0
INSOMNIA: 0
BRUISES/BLEEDS EASILY: 0
COUGH: 0
CONSTIPATION: 0
VOMITING: 0
TINGLING: 0
SWOLLEN GLANDS: 0
FEVER: 0
FATIGUE: 0
BLOATING: 0
POLYPHAGIA: 0
NERVOUS/ANXIOUS: 0
PARALYSIS: 0
DEPRESSION: 0
DIARRHEA: 1
SINUS CONGESTION: 0
ABDOMINAL PAIN: 1
DYSPNEA ON EXERTION: 0
NAUSEA: 0

## 2019-08-13 ENCOUNTER — ANCILLARY PROCEDURE (OUTPATIENT)
Dept: MRI IMAGING | Facility: CLINIC | Age: 54
End: 2019-08-13
Attending: INTERNAL MEDICINE
Payer: COMMERCIAL

## 2019-08-13 DIAGNOSIS — R92.8 ABNORMAL FINDING ON BREAST IMAGING: ICD-10-CM

## 2019-08-13 RX ORDER — GADOBUTROL 604.72 MG/ML
15 INJECTION INTRAVENOUS ONCE
Status: COMPLETED | OUTPATIENT
Start: 2019-08-13 | End: 2019-08-13

## 2019-08-13 RX ORDER — LIDOCAINE HYDROCHLORIDE AND EPINEPHRINE 10; 10 MG/ML; UG/ML
10 INJECTION, SOLUTION INFILTRATION; PERINEURAL ONCE
Status: ACTIVE | OUTPATIENT
Start: 2019-08-13

## 2019-08-13 RX ORDER — LIDOCAINE HYDROCHLORIDE 10 MG/ML
10 INJECTION, SOLUTION EPIDURAL; INFILTRATION; INTRACAUDAL; PERINEURAL ONCE
Status: ACTIVE | OUTPATIENT
Start: 2019-08-13

## 2019-08-13 RX ADMIN — GADOBUTROL 12 ML: 604.72 INJECTION INTRAVENOUS at 08:03

## 2019-08-13 NOTE — DISCHARGE INSTRUCTIONS
MRI Contrast Discharge Instructions    The IV contrast you received today will pass out of your body in your  urine. This will happen in the next 24 hours. You will not feel this process.  Your urine will not change color.    Drink at least 4 extra glasses of water or juice today (unless your doctor  has restricted your fluids). This reduces the stress on your kidneys.  You may take your regular medicines.    If you are on dialysis: It is best to have dialysis today.    If you have a reaction: Most reactions happen right away. If you have  any new symptoms after leaving the hospital (such as hives or swelling),  call your hospital at the correct number below. Or call your family doctor.  If you have breathing distress or wheezing, call 911.    Special instructions: ***    I have read and understand the above information.    Signature:______________________________________ Date:___________    Staff:__________________________________________ Date:___________     Time:__________    [place] [facility] Departments:    ___[place]: [phone]  ___[facility]: [phone]  ___[place]: [phone] ___[facility]: [phone]  ___[facility]: [phone]  ___[facility]: [phone]  ___[phone]  ___[facility]: [phone]  ___[phone]

## 2019-08-19 ENCOUNTER — OFFICE VISIT (OUTPATIENT)
Dept: PSYCHOLOGY | Facility: CLINIC | Age: 54
End: 2019-08-19
Payer: COMMERCIAL

## 2019-08-19 DIAGNOSIS — F43.10 POSTTRAUMATIC STRESS DISORDER: Primary | ICD-10-CM

## 2019-08-19 PROCEDURE — 90834 PSYTX W PT 45 MINUTES: CPT | Performed by: SOCIAL WORKER

## 2019-08-19 ASSESSMENT — ANXIETY QUESTIONNAIRES
3. WORRYING TOO MUCH ABOUT DIFFERENT THINGS: MORE THAN HALF THE DAYS
1. FEELING NERVOUS, ANXIOUS, OR ON EDGE: SEVERAL DAYS
GAD7 TOTAL SCORE: 9
6. BECOMING EASILY ANNOYED OR IRRITABLE: SEVERAL DAYS
5. BEING SO RESTLESS THAT IT IS HARD TO SIT STILL: NOT AT ALL
7. FEELING AFRAID AS IF SOMETHING AWFUL MIGHT HAPPEN: MORE THAN HALF THE DAYS
2. NOT BEING ABLE TO STOP OR CONTROL WORRYING: MORE THAN HALF THE DAYS

## 2019-08-19 ASSESSMENT — PATIENT HEALTH QUESTIONNAIRE - PHQ9
5. POOR APPETITE OR OVEREATING: SEVERAL DAYS
SUM OF ALL RESPONSES TO PHQ QUESTIONS 1-9: 6

## 2019-08-19 NOTE — PROGRESS NOTES
Progress Note    Client Name: Sola Silverman  Date: 8/19/19         Service Type: Individual      Session Start Time: 6  Session End Time: 6:45 pm      Session Length: 45 min     Session #: 29     Attendees: Client attended alone.    Treatment Plan Last Reviewed:  Due=9/24/19  Phq=5; renu=9.      DATA      Progress Since Last Session (Related to Symptoms / Goals / Homework):   Symptoms: stable.    Homework: Achieved / completed to satisfaction use distraction as needed if bothered by memories.      Episode of Care Goals: Satisfactory progress - ACTION (Actively working towards change); Intervened by reinforcing change plan / affirming steps taken    Current / Ongoing Stressors and Concerns:  Rift with sister and brother in law. Grieving loss of father. Company is moving to new location. She is no longer upset thinking about her father and is able to think about pleasant memories. Still some resentment towards sister but less.       Treatment Objective(s) Addressed in This Session:   Client will use at least 3 coping skills for anxiety management in the next 3 weeks.               Client will reprocess the negative cognition related to family loss.    Intervention:  Assessed functioning. Went over the results of the phq/renu. Used emdr to address loss of father and feeling of loneliness. Problem solved- cat idea came up.          ASSESSMENT: Current Emotional / Mental Status (status of significant symptoms):   Risk status (Self / Other harm or suicidal ideation)   Client denies current fears or concerns for personal safety.   Client denies current or recent suicidal ideation or behaviors.   Client denies current or recent homicidal ideation or behaviors.   Client denies current or recent self injurious behavior or ideation.   Client denies other safety concerns.   Client Client reports there has been no change in risk factors since their last session.     Client Client  reports there has been no change in protective factors since their last session.     A safety and risk management plan has not been developed at this time, however client was given the after-hours number / 911 should there be a change in any of these risk factors.     Appearance:   Appropriate    Eye Contact:   Good    Psychomotor Behavior: Normal    Attitude:   Cooperative    Orientation:   All   Speech    Rate / Production: Normal     Volume:  Normal    Mood:    Normal   Affect:    Appropriate    Thought Content:  Clear    Thought Form:  Coherent  Logical    Insight:    Good      Medication Review:   No current psychiatric medications prescribed     Medication Compliance:   NA     Changes in Health Issues:   None reported     Chemical Use Review:   Substance Use: Chemical use reviewed, no active concerns identified      Tobacco Use: No current tobacco use.       Collateral Reports Completed:   Not Applicable    PLAN: (Client Tasks / Therapist Tasks / Other)  Schedule monthly; she believes she is ready to be discharged soon due to progress made. Use emdr on sister issue/loss of father and the guilt she feels concerning it.  Goals due 9/24/19.      Marcello Novak MS, [facility]                                                       ________________________________________________________________________    Treatment Plan    Client's Name: Sola Silverman  YOB: 1965    Date: 12/20/18    Diagnoses:            Adjustment Disorders  309.28 (F43.23) With mixed anxiety and depressed mood  Post traumatic stress disorder added 4/1/19.  Psychosocial & Contextual Factors: Death of father, estrangement from sister, limited support network  WHODAS: 23    Referral / Collaboration:  Referral to another professional/service is not indicated at this time.  seeing me now for emdr.    Anticipated number of session or this episode of care: 10+      MeasurableTreatment Goal(s) related to diagnosis / functional  impairment(s)  Goal 1: Client will reprocess loss of father until the negative cognition no longer feels true nor upsetting.                New: 6/24/19    Goal 2: Client will reprocess rift with sister until the negative cognition no longer feels true or upsetting.               New=3/25/19    Therapist will use emdr.            Client has reviewed and agreed to the above plan.      Marcello Novak, [facility], [place] 2/18/19

## 2019-08-20 ASSESSMENT — ANXIETY QUESTIONNAIRES: GAD7 TOTAL SCORE: 9

## 2019-09-01 DIAGNOSIS — E78.01 HYPERLIPIDEMIA TYPE II: ICD-10-CM

## 2019-09-01 DIAGNOSIS — E11.9 TYPE 2 DIABETES MELLITUS WITHOUT COMPLICATION (H): ICD-10-CM

## 2019-09-06 ENCOUNTER — TRANSFERRED RECORDS (OUTPATIENT)
Dept: HEALTH INFORMATION MANAGEMENT | Facility: CLINIC | Age: 54
End: 2019-09-06

## 2019-09-09 RX ORDER — SIMVASTATIN 20 MG
TABLET ORAL
Qty: 90 TABLET | Refills: 3 | Status: SHIPPED | OUTPATIENT
Start: 2019-09-09 | End: 2019-11-08

## 2019-09-09 RX ORDER — LOSARTAN POTASSIUM 100 MG/1
TABLET ORAL
Qty: 90 TABLET | Refills: 3 | Status: SHIPPED | OUTPATIENT
Start: 2019-09-09 | End: 2019-11-08

## 2019-09-09 RX ORDER — METFORMIN HCL 500 MG
TABLET, EXTENDED RELEASE 24 HR ORAL
Qty: 360 TABLET | Refills: 0 | Status: SHIPPED | OUTPATIENT
Start: 2019-09-09 | End: 2019-11-03

## 2019-09-09 NOTE — TELEPHONE ENCOUNTER
Received refill request for metformin and simvastatin and losartan.  Last in clinic 8/2019 which resulted in the following plan after completion of labs: continue simvastatin and losartan, return to clinic to discuss better diabetes control.  Refills of medications sent.

## 2019-09-16 ENCOUNTER — OFFICE VISIT (OUTPATIENT)
Dept: PSYCHOLOGY | Facility: CLINIC | Age: 54
End: 2019-09-16
Payer: COMMERCIAL

## 2019-09-16 DIAGNOSIS — F43.10 POSTTRAUMATIC STRESS DISORDER: Primary | ICD-10-CM

## 2019-09-16 PROCEDURE — 90834 PSYTX W PT 45 MINUTES: CPT | Performed by: SOCIAL WORKER

## 2019-09-16 ASSESSMENT — ANXIETY QUESTIONNAIRES
5. BEING SO RESTLESS THAT IT IS HARD TO SIT STILL: NOT AT ALL
1. FEELING NERVOUS, ANXIOUS, OR ON EDGE: SEVERAL DAYS
2. NOT BEING ABLE TO STOP OR CONTROL WORRYING: SEVERAL DAYS
7. FEELING AFRAID AS IF SOMETHING AWFUL MIGHT HAPPEN: MORE THAN HALF THE DAYS
3. WORRYING TOO MUCH ABOUT DIFFERENT THINGS: MORE THAN HALF THE DAYS
GAD7 TOTAL SCORE: 7
6. BECOMING EASILY ANNOYED OR IRRITABLE: SEVERAL DAYS

## 2019-09-16 ASSESSMENT — PATIENT HEALTH QUESTIONNAIRE - PHQ9
5. POOR APPETITE OR OVEREATING: NOT AT ALL
SUM OF ALL RESPONSES TO PHQ QUESTIONS 1-9: 4

## 2019-09-16 NOTE — Clinical Note
She has completed her work with me. She reports being able to think of losing her father without gut clenching and now able to think with fondness about past pleasant memories.

## 2019-09-16 NOTE — PROGRESS NOTES
Discharge Summary  Multiple Sessions    Client Name: Sola Silverman MRN#: [id number] YOB: 1965    Discharge Date:   September 16, 2019      Service Type: Individual      Session Start Time: 6  Session End Time: 6:45 pm      Session Length: 45 - 50     Session #: 30     Attendees: Client attended alone    Focus of Treatment Objective(s):  Client's presenting concerns included: Grief / Loss - complicated grief surrounding loss of her father  Stage of Change at time of Discharge: MAINTENANCE (Working to maintain change, with risk of relapse)    Medication Adherence:  NA    Chemical Use:  NA    Assessment: Current Emotional / Mental Status (status of significant symptoms):    Risk status (Self / Other harm or suicidal ideation)  Client denies current fears or concerns for personal safety.  Client denies current or recent suicidal ideation or behaviors.  Client denies current or recent homicidal ideation or behaviors.  Client denies current or recent self injurious behavior or ideation.  Client denies other safety concerns.  A safety and risk management plan has not been developed at this time, however client was given the after-hours number should there be a change in any of these risk factors.    Appearance:   Appropriate   Eye Contact:   Good   Psychomotor Behavior: Normal   Attitude:   Cooperative   Orientation:   All  Speech   Rate / Production: Normal    Volume:  Normal   Mood:    Normal  Affect:    Appropriate   Thought Content:  Clear   Thought Form:  Coherent  Logical   Insight:   Good     DSM5 Diagnoses: (Sustained by DSM5 Criteria Listed Above)  Diagnoses: 309.81 (F43.10) Posttraumatic Stress Disorder (includes Posttraumatic Stress Disorder for Children 6 Years and Younger)  Without dissociative symptoms  Psychosocial & Contextual Factors: death of father.  WHODAS 2.0 (12 item) Score:      Reason for Discharge:  Client is satisfied with progress and Goals  completed      Aftercare Plan:  Client may resume counseling services at any time in the future by calling the [facility] Intake Office, [phone].      Marcello Novak, SOFIASW

## 2019-09-17 ASSESSMENT — ANXIETY QUESTIONNAIRES: GAD7 TOTAL SCORE: 7

## 2019-09-26 ENCOUNTER — ANCILLARY PROCEDURE (OUTPATIENT)
Dept: GENERAL RADIOLOGY | Facility: CLINIC | Age: 54
End: 2019-09-26
Payer: COMMERCIAL

## 2019-09-26 ENCOUNTER — OFFICE VISIT (OUTPATIENT)
Dept: ORTHOPEDICS | Facility: CLINIC | Age: 54
End: 2019-09-26
Payer: COMMERCIAL

## 2019-09-26 ENCOUNTER — PRE VISIT (OUTPATIENT)
Dept: ORTHOPEDICS | Facility: CLINIC | Age: 54
End: 2019-09-26

## 2019-09-26 VITALS — WEIGHT: 252 LBS | HEIGHT: 68 IN | BODY MASS INDEX: 38.19 KG/M2 | RESPIRATION RATE: 16 BRPM

## 2019-09-26 DIAGNOSIS — M25.561 CHRONIC PAIN OF RIGHT KNEE: ICD-10-CM

## 2019-09-26 DIAGNOSIS — G89.29 CHRONIC PAIN OF RIGHT KNEE: ICD-10-CM

## 2019-09-26 DIAGNOSIS — M25.561 CHRONIC PAIN OF RIGHT KNEE: Primary | ICD-10-CM

## 2019-09-26 DIAGNOSIS — G89.29 CHRONIC PAIN OF RIGHT KNEE: Primary | ICD-10-CM

## 2019-09-26 DIAGNOSIS — M17.11 OSTEOARTHRITIS OF RIGHT PATELLOFEMORAL JOINT: Primary | ICD-10-CM

## 2019-09-26 RX ORDER — TRIAMCINOLONE ACETONIDE 40 MG/ML
40 INJECTION, SUSPENSION INTRA-ARTICULAR; INTRAMUSCULAR
Status: DISCONTINUED | OUTPATIENT
Start: 2019-09-26 | End: 2020-02-11

## 2019-09-26 RX ORDER — LIDOCAINE HYDROCHLORIDE 10 MG/ML
4 INJECTION, SOLUTION INFILTRATION; PERINEURAL
Status: DISCONTINUED | OUTPATIENT
Start: 2019-09-26 | End: 2020-02-11

## 2019-09-26 RX ADMIN — TRIAMCINOLONE ACETONIDE 40 MG: 40 INJECTION, SUSPENSION INTRA-ARTICULAR; INTRAMUSCULAR at 13:01

## 2019-09-26 RX ADMIN — LIDOCAINE HYDROCHLORIDE 4 ML: 10 INJECTION, SOLUTION INFILTRATION; PERINEURAL at 13:01

## 2019-09-26 ASSESSMENT — MIFFLIN-ST. JEOR: SCORE: 1788.62

## 2019-09-26 NOTE — TELEPHONE ENCOUNTER
RECORDS RECEIVED FROM: Rt Knee Pain, Feel in June of 2018 but has not been seen for Rt Knee Pain, No Imaging or Surgeries, Per Pt   DATE RECEIVED: 9/26   NOTES STATUS DETAILS   OFFICE NOTE from referring provider N/A    OFFICE NOTE from other specialist N/A    DISCHARGE SUMMARY from hospital N/A    DISCHARGE REPORT from the ER N/A    OPERATIVE REPORT N/A    MEDICATION LIST Internal    MRI N/A    CT SCAN N/A    XRAYS (IMAGES & REPORTS) N/A

## 2019-09-26 NOTE — LETTER
"  9/26/2019      RE: Sola Silverman  [address] [place] [address]        Subjective:   Sola Silverman is a 53 year old female who presents with right knee pain. She was walking over fence and tripped over the fence and fell on bilateral knees. She notes some instability.     Background:   Date of injury: June 2018   Duration of symptoms: 1 years  Mechanism of Injury: Acute; Activity Related fall  Aggravating factors: stairs, squatting, walking, elliptical    Relieving Factors: compression knee sleeves  Prior Evaluation: None    In June 2018 tripped over a garden fence in clogs and fell onto her knee as she got tangled up in the fence.  Has glenn working with a  and was getting back into working out about 6 weeks.  Last 2 weeks have been more weight lifting.  With fast walking on the treadmill she would note soreness, but with increased intensity pain is getting worse.  She is wearing bilateral knee sleeves when working.  \"Grinding\" sensation, but no locking.  No numbness or tingling.  Has buckled, but not given out.  Squatting, kneeling, going downstairs and bending past 90.    History of dislocated knee with surgery on the left.  Notes that her left knee is her \"bad.\"  She was pigeon toed and wore braces in her shoes when she was younger.  Notes that her knee caps have also been lateral.    PAST MEDICAL, SOCIAL, SURGICAL AND FAMILY HISTORY: She  has a past medical history of Allergy, Ankle joint pain, BRCA2 positive, Depression, Diabetes (H), Hyperlipidemia, Hypertension, Lumbago, and Morbid obesity (H). She also has no past medical history of Amblyopia.  She  has a past surgical history that includes Salpingo-oophorectomy bilateral; knee surgery (1986); Foot surgery (2000); Excise mass lower extremity (4/11/2011); and Abdomen surgery.  Her family history includes C.A.D. in an other family member; Cancer in an other family member; Depression in her father and mother; Diabetes in some " "other family members.  She reports that she has never smoked. She has never used smokeless tobacco. She reports current alcohol use. She reports that she does not use drugs.    ALLERGIES: She is allergic to seasonal allergies and latex.    CURRENT MEDICATIONS: She has a current medication list which includes the following prescription(s): aspirin, blood glucose monitoring, blood glucose, blood glucose monitoring, calcium citrate-vitamin d, cetirizine hcl, hydrochlorothiazide, losartan, metformin, simvastatin, cholecalciferol, and yuvafem, and the following Facility-Administered Medications: lidocaine (pf) and lidocaine 1% with epinephrine 1:100,000.     REVIEW OF SYSTEMS: 6 point review of systems is negative except as noted above.     Exam:   Resp 16   Ht 1.715 m (5' 7.5\")   Wt 114.3 kg (252 lb)   BMI 38.89 kg/m         CONSTITUTIONAL: healthy, alert, no distress and over weight  HEAD: Normocephalic. No masses, lesions, tenderness or abnormalities  SKIN: no suspicious lesions or rashes  GAIT: normal  NEUROLOGIC: Non-focal  PSYCHIATRIC: affect normal/bright and mentation appears normal.    MUSCULOSKELETAL:   RIGHT KNEE:  Small effusion.  No obvious deformity.  Normal horizontal tracking of patella.  Decreased lateral tilt of patella.  TTP at medial patellar facet.  Mild medial joint line tenderness.  Negative varus and valgus stress.  Negative Lachman's.      Assessment/Plan:     Sola Silverman is here for evaluation of worsening right knee pain.  Started in June after a mechanical fall.  History of patellofemoral pain as a child.  XR obtained and demonstrated tricompartmental OA.  Significant patellofemoral OA with lateral tilt.  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 Exam consistent with patellofemoral pain.  Discussed the option of oral NSAIDs, but patient has had significant GI issues over the last few months and would like to avoid this.  She does have diabetes, but reports this is well " controlled.    RIGHT knee corticosteroid injection  After risks, benefits and complications of steroid injection were discussed with the patient, she elected to proceed.  Using sterile technique, the area was first prepped with chloraprep.  Topical ethyl chloride was used as local.  A 22 gauge, 1 1/2 inch needle was used to inject 40 mg kenalong and 4 cc of 1% lidocaine each into the knee joint using an anterolateral joint line approach on the RIGHT.  Pt.  tolerated the procedure well without complications.  Patient injection instructions given.      -CSI injection as above  -Discussed to watch BG levels for the next week, as they will increased with this injection  -Physical therapy ordered  -Knee sleeve as needed  -Continue to work out, but modify to activities as tolerated  -Follow up in about 6-8 weeks      Large Joint Injection/Arthocentesis: R knee joint  Date/Time: 9/26/2019 1:01 PM  Performed by: Linda Zhu DO  Authorized by: Linda Zhu DO     Indications:  Osteoarthritis  Needle Size:  25 G  Guidance: landmark guided    Approach:  Anterolateral  Location:  Knee      Medications:  4 mL lidocaine 1 %; 40 mg triamcinolone 40 MG/ML  Procedure discussed: discussed risks, benefits, and alternatives    Consent Given by:  Patient  Timeout: timeout called immediately prior to procedure    Prep: patient was prepped and draped in usual sterile fashion     Scribed by Eric Ramirez ATC, ATC for  on 9/26/19 at 12:45, based on providers statements to me.      Seen and discussed with Dr. Rincon.    Linda Zhu DO  Primary Care Sports Medicine Fellow        Attending Note:   I have personally examined this patient and have reviewed the clinical presentation and progress note with the fellow. I agree with the treatment plan as outlined. The plan was formulated with the fellow on the day of the patient's visit. I have reviewed all imaging with the fellow and agree with the findings in the  documentation. I have supervised the injection.     Monica Rincon MD, CAQ, CCD  [facility]  Sports Medicine and Bone Health    Linda Zhu, DO

## 2019-09-26 NOTE — NURSING NOTE
[address] [address]  Dept: [phone]  ______________________________________________________________________________    Patient: Sola Silverman   : 1965   MRN: [id number]   2019    INVASIVE PROCEDURE SAFETY CHECKLIST    Date: 19   Procedure:Right knee kenalog injection   Patient Name: Sola Silverman  MRN: [id number]  YOB: 1965    Action: Complete sections as appropriate. Any discrepancy results in a HARD COPY until resolved.     PRE PROCEDURE:  Patient ID verified with 2 identifiers (name and  or MRN): Yes  Procedure and site verified with patient/designee (when able): Yes  Accurate consent documentation in medical record: Yes  H&P (or appropriate assessment) documented in medical record: Yes  H&P must be up to 20 days prior to procedure and updates within 24 hours of procedure as applicable: NA  Relevant diagnostic and radiology test results appropriately labeled and displayed as applicable: Yes  Procedure site(s) marked with provider initials: NA    TIMEOUT:  Time-Out performed immediately prior to starting procedure, including verbal and active participation of all team members addressing the following:Yes  * Correct patient identify  * Confirmed that the correct side and site are marked  * An accurate procedure consent form  * Agreement on the procedure to be done  * Correct patient position  * Relevant images and results are properly labeled and appropriately displayed  * The need to administer antibiotics or fluids for irrigation purposes during the procedure as applicable   * Safety precautions based on patient history or medication use    DURING PROCEDURE: Verification of correct person, site, and procedures any time the responsibility for care of the patient is transferred to another member of the care team.       Prior to injection, verified patient identity using patient's name and date  of birth.  Due to injection administration, patient instructed to remain in clinic for 15 minutes  afterwards, and to report any adverse reaction to me immediately.    Joint injection was performed.      Drug Amount Wasted:  Yes: 1 mg/ml lidocaine   Vial/Syringe: Single dose vial  Expiration Date:  1/23      Eric Ramirez ATC  September 26, 2019

## 2019-09-26 NOTE — PROGRESS NOTES
" Subjective:   Sola Silverman is a 53 year old female who presents with right knee pain. She was walking over fence and tripped over the fence and fell on bilateral knees. She notes some instability.     Background:   Date of injury: June 2018   Duration of symptoms: 1 years  Mechanism of Injury: Acute; Activity Related fall  Aggravating factors: stairs, squatting, walking, elliptical    Relieving Factors: compression knee sleeves  Prior Evaluation: None    In June 2018 tripped over a garden fence in clogs and fell onto her knee as she got tangled up in the fence.  Has glenn working with a  and was getting back into working out about 6 weeks.  Last 2 weeks have been more weight lifting.  With fast walking on the treadmill she would note soreness, but with increased intensity pain is getting worse.  She is wearing bilateral knee sleeves when working.  \"Grinding\" sensation, but no locking.  No numbness or tingling.  Has buckled, but not given out.  Squatting, kneeling, going downstairs and bending past 90.    History of dislocated knee with surgery on the left.  Notes that her left knee is her \"bad.\"  She was pigeon toed and wore braces in her shoes when she was younger.  Notes that her knee caps have also been lateral.    PAST MEDICAL, SOCIAL, SURGICAL AND FAMILY HISTORY: She  has a past medical history of Allergy, Ankle joint pain, BRCA2 positive, Depression, Diabetes (H), Hyperlipidemia, Hypertension, Lumbago, and Morbid obesity (H). She also has no past medical history of Amblyopia.  She  has a past surgical history that includes Salpingo-oophorectomy bilateral; knee surgery (1986); Foot surgery (2000); Excise mass lower extremity (4/11/2011); and Abdomen surgery.  Her family history includes C.A.D. in an other family member; Cancer in an other family member; Depression in her father and mother; Diabetes in some other family members.  She reports that she has never smoked. She has never used " "smokeless tobacco. She reports current alcohol use. She reports that she does not use drugs.    ALLERGIES: She is allergic to seasonal allergies and latex.    CURRENT MEDICATIONS: She has a current medication list which includes the following prescription(s): aspirin, blood glucose monitoring, blood glucose, blood glucose monitoring, calcium citrate-vitamin d, cetirizine hcl, hydrochlorothiazide, losartan, metformin, simvastatin, cholecalciferol, and yuvafem, and the following Facility-Administered Medications: lidocaine (pf) and lidocaine 1% with epinephrine 1:100,000.     REVIEW OF SYSTEMS: 6 point review of systems is negative except as noted above.     Exam:   Resp 16   Ht 1.715 m (5' 7.5\")   Wt 114.3 kg (252 lb)   BMI 38.89 kg/m        CONSTITUTIONAL: healthy, alert, no distress and over weight  HEAD: Normocephalic. No masses, lesions, tenderness or abnormalities  SKIN: no suspicious lesions or rashes  GAIT: normal  NEUROLOGIC: Non-focal  PSYCHIATRIC: affect normal/bright and mentation appears normal.    MUSCULOSKELETAL:   RIGHT KNEE:  Small effusion.  No obvious deformity.  Normal horizontal tracking of patella.  Decreased lateral tilt of patella.  TTP at medial patellar facet.  Mild medial joint line tenderness.  Negative varus and valgus stress.  Negative Lachman's.      Assessment/Plan:     Sola Silverman is here for evaluation of worsening right knee pain.  Started in June after a mechanical fall.  History of patellofemoral pain as a child.  XR obtained and demonstrated tricompartmental OA.  Significant patellofemoral OA with lateral tilt.  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 Exam consistent with patellofemoral pain.  Discussed the option of oral NSAIDs, but patient has had significant GI issues over the last few months and would like to avoid this.  She does have diabetes, but reports this is well controlled.    RIGHT knee corticosteroid injection  After risks, benefits and " complications of steroid injection were discussed with the patient, she elected to proceed.  Using sterile technique, the area was first prepped with chloraprep.  Topical ethyl chloride was used as local.  A 22 gauge, 1 1/2 inch needle was used to inject 40 mg kenalong and 4 cc of 1% lidocaine each into the knee joint using an anterolateral joint line approach on the RIGHT.  Pt.  tolerated the procedure well without complications.  Patient injection instructions given.      -CSI injection as above  -Discussed to watch BG levels for the next week, as they will increased with this injection  -Physical therapy ordered  -Knee sleeve as needed  -Continue to work out, but modify to activities as tolerated  -Follow up in about 6-8 weeks      Large Joint Injection/Arthocentesis: R knee joint  Date/Time: 9/26/2019 1:01 PM  Performed by: Linda Zhu DO  Authorized by: Linda Zhu DO     Indications:  Osteoarthritis  Needle Size:  25 G  Guidance: landmark guided    Approach:  Anterolateral  Location:  Knee      Medications:  4 mL lidocaine 1 %; 40 mg triamcinolone 40 MG/ML  Procedure discussed: discussed risks, benefits, and alternatives    Consent Given by:  Patient  Timeout: timeout called immediately prior to procedure    Prep: patient was prepped and draped in usual sterile fashion     Scribed by Eric Ramirez ATC, ATC for  on 9/26/19 at 12:45, based on providers statements to me.      Seen and discussed with Dr. Rincon.    Linda Zhu DO  Primary Care Sports Medicine Fellow

## 2019-09-26 NOTE — PROGRESS NOTES
Attending Note:   I have personally examined this patient and have reviewed the clinical presentation and progress note with the fellow. I agree with the treatment plan as outlined. The plan was formulated with the fellow on the day of the patient's visit. I have reviewed all imaging with the fellow and agree with the findings in the documentation. I have supervised the injection.     Monica Rincon MD, CAQ, CCD  [facility]  Sports Medicine and Bone Health

## 2019-10-23 ENCOUNTER — THERAPY VISIT (OUTPATIENT)
Dept: PHYSICAL THERAPY | Facility: CLINIC | Age: 54
End: 2019-10-23
Attending: STUDENT IN AN ORGANIZED HEALTH CARE EDUCATION/TRAINING PROGRAM
Payer: COMMERCIAL

## 2019-10-23 DIAGNOSIS — G89.29 CHRONIC PAIN OF RIGHT KNEE: ICD-10-CM

## 2019-10-23 DIAGNOSIS — M25.561 CHRONIC PAIN OF RIGHT KNEE: ICD-10-CM

## 2019-10-23 DIAGNOSIS — M17.11 OSTEOARTHRITIS OF RIGHT PATELLOFEMORAL JOINT: ICD-10-CM

## 2019-10-23 PROCEDURE — 97161 PT EVAL LOW COMPLEX 20 MIN: CPT | Mod: GP | Performed by: PHYSICAL THERAPIST

## 2019-10-23 PROCEDURE — 97112 NEUROMUSCULAR REEDUCATION: CPT | Mod: GP | Performed by: PHYSICAL THERAPIST

## 2019-10-23 PROCEDURE — 97110 THERAPEUTIC EXERCISES: CPT | Mod: GP | Performed by: PHYSICAL THERAPIST

## 2019-10-23 ASSESSMENT — ACTIVITIES OF DAILY LIVING (ADL)
SWELLING: THE SYMPTOM AFFECTS MY ACTIVITY MODERATELY
GIVING WAY, BUCKLING OR SHIFTING OF KNEE: THE SYMPTOM AFFECTS MY ACTIVITY MODERATELY
STIFFNESS: THE SYMPTOM AFFECTS MY ACTIVITY SLIGHTLY
SIT WITH YOUR KNEE BENT: ACTIVITY IS SOMEWHAT DIFFICULT
LIMPING: THE SYMPTOM AFFECTS MY ACTIVITY SLIGHTLY
HOW_WOULD_YOU_RATE_THE_CURRENT_FUNCTION_OF_YOUR_KNEE_DURING_YOUR_USUAL_DAILY_ACTIVITIES_ON_A_SCALE_FROM_0_TO_100_WITH_100_BEING_YOUR_LEVEL_OF_KNEE_FUNCTION_PRIOR_TO_YOUR_INJURY_AND_0_BEING_THE_INABILITY_TO_PERFORM_ANY_OF_YOUR_USUAL_DAILY_ACTIVITIES?: 20
STAND: ACTIVITY IS MINIMALLY DIFFICULT
HOW_WOULD_YOU_RATE_THE_OVERALL_FUNCTION_OF_YOUR_KNEE_DURING_YOUR_USUAL_DAILY_ACTIVITIES?: ABNORMAL
WEAKNESS: THE SYMPTOM AFFECTS MY ACTIVITY MODERATELY
GO DOWN STAIRS: ACTIVITY IS SOMEWHAT DIFFICULT
AS_A_RESULT_OF_YOUR_KNEE_INJURY,_HOW_WOULD_YOU_RATE_YOUR_CURRENT_LEVEL_OF_DAILY_ACTIVITY?: ABNORMAL
KNEE_ACTIVITY_OF_DAILY_LIVING_SCORE: 57.14
GO UP STAIRS: ACTIVITY IS MINIMALLY DIFFICULT
WALK: ACTIVITY IS MINIMALLY DIFFICULT
KNEEL ON THE FRONT OF YOUR KNEE: ACTIVITY IS FAIRLY DIFFICULT
KNEE_ACTIVITY_OF_DAILY_LIVING_SUM: 40
RISE FROM A CHAIR: ACTIVITY IS MINIMALLY DIFFICULT
PAIN: THE SYMPTOM AFFECTS MY ACTIVITY MODERATELY
SQUAT: ACTIVITY IS FAIRLY DIFFICULT
RAW_SCORE: 40

## 2019-10-23 NOTE — LETTER
[facility]  [address]  [place] [address]  [phone]    2019    Re: Sola Silverman   :   1965  MRN:  [id number]   REFERRING PHYSICIAN:   [facility]    Date of Initial Evaluation:  10/23/19  Visits:  Rxs Used: 1  Reason for Referral:     Osteoarthritis of right patellofemoral joint  Chronic pain of right knee    [facility] Initial Evaluation    Subjective:  Sola Silverman is a 54 year old female with a right knee condition.    The condition occurred due to a fall.  The condition occurred in the community/at home.  This is a chronic condition.  Mechanism/History of injury/symptoms:  19 patient received MD orders for PT for right knee pain that started in 2019 when she started working out aggressively.  She had fallen on her knees on a sidewalk in 2018.  The pain is located anterior, medial and the quality of pain is reported as sharp, achy.  The degree of pain is reported as 0/10 today but gets >5/10. The timing of pain/symptoms is intermittent. Associated symptoms include: loss of motion, weakness  Symptoms are exacerbated by: squats, kneeling, descending stairs, working out.  Symptoms are relieved by: rest, bracing/taping.  Since onset symptoms are unchanged but better with some rest.  Special tests for this condition include: x-ray.  Previous treatments for this condition include: none.  General health as reported by patient is fair/good.  Pertinent medical history includes: cancer, diabetes, high blood pressure, overweight, calf pain/swelling, pain at rest/night.  Medical allergies include: latex.  Other surgeries include: left foot (cancer), left knee patellar stabilization.  Current medications:  Metformin, aspirin, allergy, high blood pressure, hormone replacement, simvastatin  Current occupation: Research .  Patient's home/work tasks include: Computer work, prolonged sitting, repetitive tasks.   Patient is currently working a normal job without restrictions.    Potential barriers to physical therapy: None.  Red flags: None.    Previous level of function: Able to descend stairs without knee pain, able to squat and kneel on right side without any pain  Current functional restrictions: Unable to kneel on right side, right knee pain with squatting/lunging, right knee pain descending stairs    Objective:  KNEE:    PROM:   L  R   Hyperextension 5 5   Extension 0 0   Flexion 135 125     Strength:   L R   HIP     Flex 5/5 5-/5   Ext 4/5 4/5   Abd 4/5 4/5   KNEE     Flex 4/5 4/5   Ext 5/5 5-/5     Special tests:   L R   Anterior Drawer - -   Posterior Drawer - -   Lachman's - -   Valgus 0 degrees - -   Valgus 30 degrees - -   Varus 0 degrees - -   Varus 30 degrees - -   Digna's - -   Appley's - -   Lateral Compression - -   Patellar Compression - +     Palpation: right knee tenderness noted along medial retinaculum, medial joint line, infrapatellar fat pad    Patellar tracking: lateral tracking noted with active extension, lateral tilt noted at rest in full extension    Functional: Fair to good double leg squat form with R knee pain at 80-90 degrees.  Bilateral knee pain with single leg squat to 30-40 degrees, demos dynamic valgus.    Gait: WNL    Assessment/Plan:    Patient is a 54 year old female with right side knee complaints.    Patient has the following significant findings with corresponding treatment plan.                Diagnosis 1:  Right knee pain, PFPS    Pain -  hot/cold therapy, US, electric stimulation, splint/taping/bracing/orthotics, self management, education and home program  Decreased ROM/flexibility - manual therapy, therapeutic exercise and home program  Decreased strength - therapeutic exercise, therapeutic activities and home program  Decreased proprioception - neuro re-education, therapeutic activities and home program  Decreased function - therapeutic activities and home  program  Instability -  Therapeutic Activity  Therapeutic Exercise  Neuromuscular Re-education  Splinting/Taping/Bracing/Orthotic  home program    Therapy Evaluation Codes:   1) History comprised of:   Personal factors that impact the plan of care:      None.    Comorbidity factors that impact the plan of care are:      Cancer, Diabetes, High blood pressure, Overweight and Pain at night/rest.     Medications impacting care: High blood pressure and hormone replacement, metformin, aspirin, allergy simvastatin.  2) Examination of Body Systems comprised of:   Body structures and functions that impact the plan of care:      Knee.   Activity limitations that impact the plan of care are:      Bending, Squatting/kneeling and Stairs.  3) Clinical presentation characteristics are:   Stable/Uncomplicated.  4) Decision-Making    Low complexity using standardized patient assessment instrument and/or measureable assessment of functional outcome.    Cumulative Therapy Evaluation is: Low complexity.  Previous and current functional limitations:  (See Goal Flow Sheet for this information)    Short term and Long term goals: (See Goal Flow Sheet for this information)     Communication ability:  Patient appears to be able to clearly communicate and understand verbal and written communication and follow directions correctly.  Treatment Explanation - The following has been discussed with the patient:   RX ordered/plan of care  Anticipated outcomes  Possible risks and side effects  This patient would benefit from PT intervention to resume normal activities.   Rehab potential is good.    Frequency:  1-2 X week, once daily  Duration:  for 6 weeks  Discharge Plan:  Achieve all LTG.  Independent in home treatment program.  Reach maximal therapeutic benefit.    Thank you for your referral.    INQUIRIES  Therapist: Andre Niño, PT, DPT, SCS  [facility] [place]  [address] [place] [address]  Phone: [phone]  Fax: [phone]

## 2019-10-23 NOTE — PROGRESS NOTES
[facility] Initial Evaluation  Subjective:  Sola Silverman is a 54 year old female with a right knee condition.    The condition occurred due to a fall.  The condition occurred in the community/at home.  This is a chronic condition.    Mechanism/History of injury/symptoms:  9/26/19 patient received MD orders for PT for right knee pain that started in June 2019 when she started working out aggressively.  She had fallen on her knees on a sidewalk in June 2018.  The pain is located anterior, medial and the quality of pain is reported as sharp, achy.  The degree of pain is reported as 0/10 today but gets >5/10. The timing of pain/symptoms is intermittent. Associated symptoms include: loss of motion, weakness    Symptoms are exacerbated by: squats, kneeling, descending stairs, working out.  Symptoms are relieved by: rest, bracing/taping.  Since onset symptoms are unchanged but better with some rest.    Special tests for this condition include: x-ray.  Previous treatments for this condition include: none.    General health as reported by patient is fair/good.  Pertinent medical history includes: cancer, diabetes, high blood pressure, overweight, calf pain/swelling, pain at rest/night.  Medical allergies include: latex.  Other surgeries include: left foot (cancer), left knee patellar stabilization.  Current medications:  Metformin, aspirin, allergy, high blood pressure, hormone replacement, simvastatin    Current occupation: Research .  Patient's home/work tasks include: Computer work, prolonged sitting, repetitive tasks.  Patient is currently working a normal job without restrictions.    Potential barriers to physical therapy: None.  Red flags: None.    Previous level of function: Able to descend stairs without knee pain, able to squat and kneel on right side without any pain  Current functional restrictions: Unable to kneel on right side, right knee pain with squatting/lunging, right knee  pain descending stairs    HPI                    Objective:  KNEE:    PROM:   L  R   Hyperextension 5 5   Extension 0 0   Flexion 135 125     Strength:   L R   HIP     Flex 5/5 5-/5   Ext 4/5 4/5   Abd 4/5 4/5   KNEE     Flex 4/5 4/5   Ext 5/5 5-/5     Special tests:   L R   Anterior Drawer - -   Posterior Drawer - -   Lachman's - -   Valgus 0 degrees - -   Valgus 30 degrees - -   Varus 0 degrees - -   Varus 30 degrees - -   Digna's - -   Appley's - -   Lateral Compression - -   Patellar Compression - +       Palpation: right knee tenderness noted along medial retinaculum, medial joint line, infrapatellar fat pad    Patellar tracking: lateral tracking noted with active extension, lateral tilt noted at rest in full extension    Functional: Fair to good double leg squat form with R knee pain at 80-90 degrees.  Bilateral knee pain with single leg squat to 30-40 degrees, demos dynamic valgus.    Gait: WNL        System    Physical Exam    General     ROS    Assessment/Plan:    Patient is a 54 year old female with right side knee complaints.    Patient has the following significant findings with corresponding treatment plan.                Diagnosis 1:  Right knee pain, PFPS    Pain -  hot/cold therapy, US, electric stimulation, splint/taping/bracing/orthotics, self management, education and home program  Decreased ROM/flexibility - manual therapy, therapeutic exercise and home program  Decreased strength - therapeutic exercise, therapeutic activities and home program  Decreased proprioception - neuro re-education, therapeutic activities and home program  Decreased function - therapeutic activities and home program  Instability -  Therapeutic Activity  Therapeutic Exercise  Neuromuscular Re-education  Splinting/Taping/Bracing/Orthotic  home program    Therapy Evaluation Codes:   1) History comprised of:   Personal factors that impact the plan of care:      None.    Comorbidity factors that impact the plan of care are:       Cancer, Diabetes, High blood pressure, Overweight and Pain at night/rest.     Medications impacting care: High blood pressure and hormone replacement, metformin, aspirin, allergy simvastatin.  2) Examination of Body Systems comprised of:   Body structures and functions that impact the plan of care:      Knee.   Activity limitations that impact the plan of care are:      Bending, Squatting/kneeling and Stairs.  3) Clinical presentation characteristics are:   Stable/Uncomplicated.  4) Decision-Making    Low complexity using standardized patient assessment instrument and/or measureable assessment of functional outcome.  Cumulative Therapy Evaluation is: Low complexity.    Previous and current functional limitations:  (See Goal Flow Sheet for this information)    Short term and Long term goals: (See Goal Flow Sheet for this information)     Communication ability:  Patient appears to be able to clearly communicate and understand verbal and written communication and follow directions correctly.  Treatment Explanation - The following has been discussed with the patient:   RX ordered/plan of care  Anticipated outcomes  Possible risks and side effects  This patient would benefit from PT intervention to resume normal activities.   Rehab potential is good.    Frequency:  1-2 X week, once daily  Duration:  for 6 weeks  Discharge Plan:  Achieve all LTG.  Independent in home treatment program.  Reach maximal therapeutic benefit.    Please refer to the daily flowsheet for treatment today, total treatment time and time spent performing 1:1 timed codes.

## 2019-10-25 ENCOUNTER — THERAPY VISIT (OUTPATIENT)
Dept: PHYSICAL THERAPY | Facility: CLINIC | Age: 54
End: 2019-10-25
Payer: COMMERCIAL

## 2019-10-25 DIAGNOSIS — M25.561 CHRONIC PAIN OF RIGHT KNEE: ICD-10-CM

## 2019-10-25 DIAGNOSIS — G89.29 CHRONIC PAIN OF RIGHT KNEE: ICD-10-CM

## 2019-10-25 PROCEDURE — 97112 NEUROMUSCULAR REEDUCATION: CPT | Mod: GP | Performed by: PHYSICAL THERAPIST

## 2019-10-25 PROCEDURE — 97110 THERAPEUTIC EXERCISES: CPT | Mod: GP | Performed by: PHYSICAL THERAPIST

## 2019-10-30 ENCOUNTER — THERAPY VISIT (OUTPATIENT)
Dept: PHYSICAL THERAPY | Facility: CLINIC | Age: 54
End: 2019-10-30
Payer: COMMERCIAL

## 2019-10-30 DIAGNOSIS — M25.561 CHRONIC PAIN OF RIGHT KNEE: ICD-10-CM

## 2019-10-30 DIAGNOSIS — G89.29 CHRONIC PAIN OF RIGHT KNEE: ICD-10-CM

## 2019-10-30 PROCEDURE — 97112 NEUROMUSCULAR REEDUCATION: CPT | Mod: GP | Performed by: PHYSICAL THERAPIST

## 2019-10-30 PROCEDURE — 97110 THERAPEUTIC EXERCISES: CPT | Mod: GP | Performed by: PHYSICAL THERAPIST

## 2019-11-01 ENCOUNTER — THERAPY VISIT (OUTPATIENT)
Dept: PHYSICAL THERAPY | Facility: CLINIC | Age: 54
End: 2019-11-01
Payer: COMMERCIAL

## 2019-11-01 DIAGNOSIS — M25.561 CHRONIC PAIN OF RIGHT KNEE: ICD-10-CM

## 2019-11-01 DIAGNOSIS — G89.29 CHRONIC PAIN OF RIGHT KNEE: ICD-10-CM

## 2019-11-01 PROCEDURE — 97110 THERAPEUTIC EXERCISES: CPT | Mod: GP | Performed by: PHYSICAL THERAPIST

## 2019-11-01 PROCEDURE — 97112 NEUROMUSCULAR REEDUCATION: CPT | Mod: GP | Performed by: PHYSICAL THERAPIST

## 2019-11-03 DIAGNOSIS — E11.9 TYPE 2 DIABETES MELLITUS WITHOUT COMPLICATION (H): ICD-10-CM

## 2019-11-03 DIAGNOSIS — E11.9 TYPE 2 DIABETES MELLITUS WITHOUT COMPLICATION, WITHOUT LONG-TERM CURRENT USE OF INSULIN (H): Primary | ICD-10-CM

## 2019-11-05 RX ORDER — METFORMIN HCL 500 MG
TABLET, EXTENDED RELEASE 24 HR ORAL
Qty: 360 TABLET | Refills: 0 | Status: SHIPPED | OUTPATIENT
Start: 2019-11-05 | End: 2019-11-08

## 2019-11-05 NOTE — TELEPHONE ENCOUNTER
Received refill request for metformin. Patient has upcoming follow up scheduled. Temporary Rx sent.

## 2019-11-06 ENCOUNTER — THERAPY VISIT (OUTPATIENT)
Dept: PHYSICAL THERAPY | Facility: CLINIC | Age: 54
End: 2019-11-06
Payer: COMMERCIAL

## 2019-11-06 DIAGNOSIS — G89.29 CHRONIC PAIN OF RIGHT KNEE: ICD-10-CM

## 2019-11-06 DIAGNOSIS — M25.561 CHRONIC PAIN OF RIGHT KNEE: ICD-10-CM

## 2019-11-06 PROCEDURE — 97112 NEUROMUSCULAR REEDUCATION: CPT | Mod: GP | Performed by: PHYSICAL THERAPIST

## 2019-11-06 PROCEDURE — 97110 THERAPEUTIC EXERCISES: CPT | Mod: GP | Performed by: PHYSICAL THERAPIST

## 2019-11-07 ENCOUNTER — HEALTH MAINTENANCE LETTER (OUTPATIENT)
Age: 54
End: 2019-11-07

## 2019-11-08 ENCOUNTER — OFFICE VISIT (OUTPATIENT)
Dept: INTERNAL MEDICINE | Facility: CLINIC | Age: 54
End: 2019-11-08
Attending: INTERNAL MEDICINE
Payer: COMMERCIAL

## 2019-11-08 VITALS
DIASTOLIC BLOOD PRESSURE: 80 MMHG | HEART RATE: 75 BPM | WEIGHT: 249 LBS | BODY MASS INDEX: 38.42 KG/M2 | SYSTOLIC BLOOD PRESSURE: 117 MMHG

## 2019-11-08 DIAGNOSIS — E11.9 TYPE 2 DIABETES MELLITUS WITHOUT COMPLICATION, WITHOUT LONG-TERM CURRENT USE OF INSULIN (H): ICD-10-CM

## 2019-11-08 DIAGNOSIS — I10 ESSENTIAL HYPERTENSION: ICD-10-CM

## 2019-11-08 DIAGNOSIS — E78.01 HYPERLIPIDEMIA TYPE II: ICD-10-CM

## 2019-11-08 LAB — HBA1C MFR BLD: 6.4 % (ref 0–5.6)

## 2019-11-08 PROCEDURE — 36415 COLL VENOUS BLD VENIPUNCTURE: CPT | Performed by: INTERNAL MEDICINE

## 2019-11-08 PROCEDURE — G0008 ADMIN INFLUENZA VIRUS VAC: HCPCS | Mod: ZF

## 2019-11-08 PROCEDURE — G0463 HOSPITAL OUTPT CLINIC VISIT: HCPCS | Mod: 25

## 2019-11-08 PROCEDURE — 90670 PCV13 VACCINE IM: CPT | Mod: ZF

## 2019-11-08 PROCEDURE — 83036 HEMOGLOBIN GLYCOSYLATED A1C: CPT | Performed by: INTERNAL MEDICINE

## 2019-11-08 PROCEDURE — G0009 ADMIN PNEUMOCOCCAL VACCINE: HCPCS | Mod: ZF

## 2019-11-08 PROCEDURE — 25000128 H RX IP 250 OP 636: Mod: ZF

## 2019-11-08 PROCEDURE — 90686 IIV4 VACC NO PRSV 0.5 ML IM: CPT | Mod: ZF

## 2019-11-08 RX ORDER — HYDROCHLOROTHIAZIDE 25 MG/1
25 TABLET ORAL DAILY
Qty: 90 TABLET | Refills: 3 | Status: SHIPPED | OUTPATIENT
Start: 2019-11-08 | End: 2020-02-11

## 2019-11-08 RX ORDER — LOSARTAN POTASSIUM 100 MG/1
100 TABLET ORAL DAILY
Qty: 90 TABLET | Refills: 3 | Status: SHIPPED | OUTPATIENT
Start: 2019-11-08 | End: 2020-02-11

## 2019-11-08 RX ORDER — METFORMIN HCL 500 MG
1000 TABLET, EXTENDED RELEASE 24 HR ORAL 2 TIMES DAILY WITH MEALS
Qty: 360 TABLET | Refills: 3 | Status: SHIPPED | OUTPATIENT
Start: 2019-11-08 | End: 2020-02-04

## 2019-11-08 RX ORDER — SIMVASTATIN 20 MG
TABLET ORAL
Qty: 90 TABLET | Refills: 3 | Status: SHIPPED | OUTPATIENT
Start: 2019-11-08 | End: 2020-02-11

## 2019-11-08 ASSESSMENT — PAIN SCALES - GENERAL: PAINLEVEL: NO PAIN (0)

## 2019-11-08 ASSESSMENT — ANXIETY QUESTIONNAIRES
5. BEING SO RESTLESS THAT IT IS HARD TO SIT STILL: NOT AT ALL
GAD7 TOTAL SCORE: 0
7. FEELING AFRAID AS IF SOMETHING AWFUL MIGHT HAPPEN: NOT AT ALL
2. NOT BEING ABLE TO STOP OR CONTROL WORRYING: NOT AT ALL
6. BECOMING EASILY ANNOYED OR IRRITABLE: NOT AT ALL
3. WORRYING TOO MUCH ABOUT DIFFERENT THINGS: NOT AT ALL
1. FEELING NERVOUS, ANXIOUS, OR ON EDGE: NOT AT ALL

## 2019-11-08 ASSESSMENT — PATIENT HEALTH QUESTIONNAIRE - PHQ9
5. POOR APPETITE OR OVEREATING: NOT AT ALL
SUM OF ALL RESPONSES TO PHQ QUESTIONS 1-9: 0

## 2019-11-08 NOTE — LETTER
11/8/2019       RE: Sola Silverman  [address] [place] [address]     Dear Colleague,    Thank you for referring your patient, Sola Silverman, to the [facility]  at [facility]. Please see a copy of my visit note below.    HPI      Review of Systems     Constitutional:  Negative for fever and fatigue.   HENT:  Negative for hearing loss, dry mouth and sinus congestion.    Respiratory:   Negative for cough and dyspnea on exertion.    Cardiovascular:  Negative for chest pain, dyspnea on exertion and edema.   Gastrointestinal:  Negative for nausea, vomiting, diarrhea and melena.   Skin:  Negative for itching and rash.   Neurological:  Negative for loss of consciousness, weakness and headaches.   Psychiatric/Behavioral:  Negative for depression, decreased concentration, mood swings and panic attacks.    Endocrine:  Negative for altered temperature regulation, polyphagia, polydipsia, unwanted hair growth and change in facial hair.    Current Outpatient Medications   Medication     aspirin 81 MG tablet     blood glucose monitoring (ACCU-CHEK COMPACT CARE KIT) meter device kit     blood glucose monitoring (ACCU-CHEK SMARTVIEW) test strip     Calcium-Vitamin D (CALCIUM + D PO)     Cetirizine HCl (ZYRTEC PO)     hydrochlorothiazide (HYDRODIURIL) 25 MG tablet     losartan (COZAAR) 100 MG tablet     metFORMIN (GLUCOPHAGE-XR) 500 MG 24 hr tablet     simvastatin (ZOCOR) 20 MG tablet     VITAMIN D, CHOLECALCIFEROL, PO     YUVAFEM 10 MCG TABS vaginal tablet     Current Facility-Administered Medications   Medication     lidocaine 1 % injection 4 mL     triamcinolone (KENALOG-40) injection 40 mg     Facility-Administered Medications Ordered in Other Visits   Medication     lidocaine (PF) (XYLOCAINE) 1 % injection 10 mL     lidocaine 1% with EPINEPHrine 1:100,000 injection 10 mL     Vitals:    11/08/19 1016 11/08/19 1017 11/08/19 1018 11/08/19 1019   BP: 122/83  116/80 113/79 117/80   Pulse: 75 75 75    Weight: 112.9 kg (249 lb)            Physical Exam  Vitals signs and nursing note reviewed.   Constitutional:       Appearance: Normal appearance.   HENT:      Head: Normocephalic and atraumatic.      Mouth/Throat:      Mouth: Mucous membranes are moist.      Pharynx: Oropharynx is clear.   Cardiovascular:      Rate and Rhythm: Normal rate.      Pulses:           Dorsalis pedis pulses are 3+ on the right side and 3+ on the left side.        Posterior tibial pulses are 3+ on the right side and 3+ on the left side.      Heart sounds: Normal heart sounds.   Pulmonary:      Effort: Pulmonary effort is normal.      Breath sounds: Normal breath sounds.   Musculoskeletal:         General: No edema.      Right foot: No deformity, bunion, Charcot foot or prominent metatarsal heads.      Left foot: No deformity, bunion, Charcot foot, foot drop or prominent metatarsal heads.   Feet:      Right foot:      Protective Sensation: 10 sites tested. 10 sites sensed.      Skin integrity: Skin integrity normal.      Left foot:      Protective Sensation: 10 sites tested. 10 sites sensed.      Skin integrity: Skin integrity normal.   Neurological:      General: No focal deficit present.      Mental Status: She is alert and oriented to person, place, and time. Mental status is at baseline.   Psychiatric:         Mood and Affect: Mood normal.         Behavior: Behavior normal.         Thought Content: Thought content normal.         Judgement: Judgment normal.       Assessment and Plan:  Sola was seen today for follow up.    Diagnoses and all orders for this visit:    Essential hypertension. Blood pressure is within acceptable range. Recommend to continue with current medical therapy without changes.   -     hydrochlorothiazide (HYDRODIURIL) 25 MG tablet; Take 1 tablet (25 mg) by mouth daily    Hyperlipidemia type II. Will continue with lifestyle modifications and simvastatin at current dose.   -      losartan (COZAAR) 100 MG tablet; Take 1 tablet (100 mg) by mouth daily  -     simvastatin (ZOCOR) 20 MG tablet; TAKE 1 TABLET BY MOUTH  DAILY    Type 2 diabetes mellitus without complication, without long-term current use of insulin (H). Discussed glycemic goals with patient. Recommend checking Hgb A1C today to determine the need for therapy adjustment. Patient will be advised on test results accordingly.   -     metFORMIN (GLUCOPHAGE-XR) 500 MG 24 hr tablet; Take 2 tablets (1,000 mg) by mouth 2 times daily (with meals)  -     blood glucose (ACCU-CHEK SMARTVIEW) test strip; TEST FOUR TIMES DAILY AS DIRECTED  -     FLU VAC PRESRV FREE QUAD SPLIT VIR 3+YRS IM  -     blood glucose (NO BRAND SPECIFIED) lancets standard; Use to test blood sugar 2 times daily or as directed.  -     Hemoglobin A1c  -     Pneumococcal vaccine 13 valent PCV13 IM (Prevnar) [[id number]]      Total time spent 25 minutes.  More than 50% of the time spent with Ms. Silverman on counseling / coordinating her care    Alda Santamaria MD

## 2019-11-08 NOTE — PROGRESS NOTES
HPI      Review of Systems     Constitutional:  Negative for fever and fatigue.   HENT:  Negative for hearing loss, dry mouth and sinus congestion.    Respiratory:   Negative for cough and dyspnea on exertion.    Cardiovascular:  Negative for chest pain, dyspnea on exertion and edema.   Gastrointestinal:  Negative for nausea, vomiting, diarrhea and melena.   Skin:  Negative for itching and rash.   Neurological:  Negative for loss of consciousness, weakness and headaches.   Psychiatric/Behavioral:  Negative for depression, decreased concentration, mood swings and panic attacks.    Endocrine:  Negative for altered temperature regulation, polyphagia, polydipsia, unwanted hair growth and change in facial hair.    Current Outpatient Medications   Medication     aspirin 81 MG tablet     blood glucose monitoring (ACCU-CHEK COMPACT CARE KIT) meter device kit     blood glucose monitoring (ACCU-CHEK SMARTVIEW) test strip     Calcium-Vitamin D (CALCIUM + D PO)     Cetirizine HCl (ZYRTEC PO)     hydrochlorothiazide (HYDRODIURIL) 25 MG tablet     losartan (COZAAR) 100 MG tablet     metFORMIN (GLUCOPHAGE-XR) 500 MG 24 hr tablet     simvastatin (ZOCOR) 20 MG tablet     VITAMIN D, CHOLECALCIFEROL, PO     YUVAFEM 10 MCG TABS vaginal tablet     Current Facility-Administered Medications   Medication     lidocaine 1 % injection 4 mL     triamcinolone (KENALOG-40) injection 40 mg     Facility-Administered Medications Ordered in Other Visits   Medication     lidocaine (PF) (XYLOCAINE) 1 % injection 10 mL     lidocaine 1% with EPINEPHrine 1:100,000 injection 10 mL     Vitals:    11/08/19 1016 11/08/19 1017 11/08/19 1018 11/08/19 1019   BP: 122/83 116/80 113/79 117/80   Pulse: 75 75 75    Weight: 112.9 kg (249 lb)            Physical Exam  Vitals signs and nursing note reviewed.   Constitutional:       Appearance: Normal appearance.   HENT:      Head: Normocephalic and atraumatic.      Mouth/Throat:      Mouth: Mucous membranes are moist.       Pharynx: Oropharynx is clear.   Cardiovascular:      Rate and Rhythm: Normal rate.      Pulses:           Dorsalis pedis pulses are 3+ on the right side and 3+ on the left side.        Posterior tibial pulses are 3+ on the right side and 3+ on the left side.      Heart sounds: Normal heart sounds.   Pulmonary:      Effort: Pulmonary effort is normal.      Breath sounds: Normal breath sounds.   Musculoskeletal:         General: No edema.      Right foot: No deformity, bunion, Charcot foot or prominent metatarsal heads.      Left foot: No deformity, bunion, Charcot foot, foot drop or prominent metatarsal heads.   Feet:      Right foot:      Protective Sensation: 10 sites tested. 10 sites sensed.      Skin integrity: Skin integrity normal.      Left foot:      Protective Sensation: 10 sites tested. 10 sites sensed.      Skin integrity: Skin integrity normal.   Neurological:      General: No focal deficit present.      Mental Status: She is alert and oriented to person, place, and time. Mental status is at baseline.   Psychiatric:         Mood and Affect: Mood normal.         Behavior: Behavior normal.         Thought Content: Thought content normal.         Judgement: Judgment normal.       Assessment and Plan:  Sola was seen today for follow up.    Diagnoses and all orders for this visit:    Essential hypertension. Blood pressure is within acceptable range. Recommend to continue with current medical therapy without changes.   -     hydrochlorothiazide (HYDRODIURIL) 25 MG tablet; Take 1 tablet (25 mg) by mouth daily    Hyperlipidemia type II. Will continue with lifestyle modifications and simvastatin at current dose.   -     losartan (COZAAR) 100 MG tablet; Take 1 tablet (100 mg) by mouth daily  -     simvastatin (ZOCOR) 20 MG tablet; TAKE 1 TABLET BY MOUTH  DAILY    Type 2 diabetes mellitus without complication, without long-term current use of insulin (H). Discussed glycemic goals with patient. Recommend  checking Hgb A1C today to determine the need for therapy adjustment. Patient will be advised on test results accordingly.   -     metFORMIN (GLUCOPHAGE-XR) 500 MG 24 hr tablet; Take 2 tablets (1,000 mg) by mouth 2 times daily (with meals)  -     blood glucose (ACCU-CHEK SMARTVIEW) test strip; TEST FOUR TIMES DAILY AS DIRECTED  -     FLU VAC PRESRV FREE QUAD SPLIT VIR 3+YRS IM  -     blood glucose (NO BRAND SPECIFIED) lancets standard; Use to test blood sugar 2 times daily or as directed.  -     Hemoglobin A1c  -     Pneumococcal vaccine 13 valent PCV13 IM (Prevnar) [[id number]]      Total time spent 25 minutes.  More than 50% of the time spent with Ms. Silverman on counseling / coordinating her care    Alda Santamaria MD

## 2019-11-09 ASSESSMENT — ANXIETY QUESTIONNAIRES: GAD7 TOTAL SCORE: 0

## 2019-11-10 ASSESSMENT — ENCOUNTER SYMPTOMS
NERVOUS/ANXIOUS: 0
INSOMNIA: 0
LOSS OF CONSCIOUSNESS: 0
POLYDIPSIA: 0
WEAKNESS: 0
DIARRHEA: 0
NAUSEA: 0
DEPRESSION: 0
DECREASED CONCENTRATION: 0
PANIC: 0
HEADACHES: 0
VOMITING: 0
COUGH: 0
FEVER: 0
POLYPHAGIA: 0
FATIGUE: 0
SINUS CONGESTION: 0
DYSPNEA ON EXERTION: 0
ALTERED TEMPERATURE REGULATION: 0

## 2019-11-15 ENCOUNTER — THERAPY VISIT (OUTPATIENT)
Dept: PHYSICAL THERAPY | Facility: CLINIC | Age: 54
End: 2019-11-15
Payer: COMMERCIAL

## 2019-11-15 DIAGNOSIS — M25.561 CHRONIC PAIN OF RIGHT KNEE: ICD-10-CM

## 2019-11-15 DIAGNOSIS — G89.29 CHRONIC PAIN OF RIGHT KNEE: ICD-10-CM

## 2019-11-15 PROCEDURE — 97112 NEUROMUSCULAR REEDUCATION: CPT | Mod: GP | Performed by: PHYSICAL THERAPIST

## 2019-11-15 PROCEDURE — 97110 THERAPEUTIC EXERCISES: CPT | Mod: GP | Performed by: PHYSICAL THERAPIST

## 2019-11-22 ENCOUNTER — THERAPY VISIT (OUTPATIENT)
Dept: PHYSICAL THERAPY | Facility: CLINIC | Age: 54
End: 2019-11-22
Payer: COMMERCIAL

## 2019-11-22 DIAGNOSIS — G89.29 CHRONIC PAIN OF RIGHT KNEE: ICD-10-CM

## 2019-11-22 DIAGNOSIS — M25.561 CHRONIC PAIN OF RIGHT KNEE: ICD-10-CM

## 2019-11-22 PROCEDURE — 97112 NEUROMUSCULAR REEDUCATION: CPT | Mod: GP | Performed by: PHYSICAL THERAPIST

## 2019-11-22 PROCEDURE — 97110 THERAPEUTIC EXERCISES: CPT | Mod: GP | Performed by: PHYSICAL THERAPIST

## 2019-12-04 ENCOUNTER — THERAPY VISIT (OUTPATIENT)
Dept: PHYSICAL THERAPY | Facility: CLINIC | Age: 54
End: 2019-12-04
Payer: COMMERCIAL

## 2019-12-04 DIAGNOSIS — G89.29 CHRONIC PAIN OF RIGHT KNEE: ICD-10-CM

## 2019-12-04 DIAGNOSIS — M25.561 CHRONIC PAIN OF RIGHT KNEE: ICD-10-CM

## 2019-12-04 PROCEDURE — 97110 THERAPEUTIC EXERCISES: CPT | Mod: GP | Performed by: PHYSICAL THERAPIST

## 2019-12-04 PROCEDURE — 97112 NEUROMUSCULAR REEDUCATION: CPT | Mod: GP | Performed by: PHYSICAL THERAPIST

## 2019-12-19 ENCOUNTER — THERAPY VISIT (OUTPATIENT)
Dept: PHYSICAL THERAPY | Facility: CLINIC | Age: 54
End: 2019-12-19
Payer: COMMERCIAL

## 2019-12-19 DIAGNOSIS — G89.29 CHRONIC PAIN OF RIGHT KNEE: ICD-10-CM

## 2019-12-19 DIAGNOSIS — M25.561 CHRONIC PAIN OF RIGHT KNEE: ICD-10-CM

## 2019-12-19 PROCEDURE — 97112 NEUROMUSCULAR REEDUCATION: CPT | Mod: GP | Performed by: PHYSICAL THERAPIST

## 2019-12-19 PROCEDURE — 97110 THERAPEUTIC EXERCISES: CPT | Mod: GP | Performed by: PHYSICAL THERAPIST

## 2019-12-19 ASSESSMENT — ACTIVITIES OF DAILY LIVING (ADL)
STIFFNESS: I DO NOT HAVE THE SYMPTOM
KNEE_ACTIVITY_OF_DAILY_LIVING_SUM: 60
WALK: ACTIVITY IS NOT DIFFICULT
PAIN: THE SYMPTOM AFFECTS MY ACTIVITY SLIGHTLY
RISE FROM A CHAIR: ACTIVITY IS NOT DIFFICULT
RAW_SCORE: 60
WEAKNESS: THE SYMPTOM AFFECTS MY ACTIVITY SLIGHTLY
SQUAT: ACTIVITY IS MINIMALLY DIFFICULT
SWELLING: I DO NOT HAVE THE SYMPTOM
HOW_WOULD_YOU_RATE_THE_CURRENT_FUNCTION_OF_YOUR_KNEE_DURING_YOUR_USUAL_DAILY_ACTIVITIES_ON_A_SCALE_FROM_0_TO_100_WITH_100_BEING_YOUR_LEVEL_OF_KNEE_FUNCTION_PRIOR_TO_YOUR_INJURY_AND_0_BEING_THE_INABILITY_TO_PERFORM_ANY_OF_YOUR_USUAL_DAILY_ACTIVITIES?: 80
STAND: ACTIVITY IS NOT DIFFICULT
AS_A_RESULT_OF_YOUR_KNEE_INJURY,_HOW_WOULD_YOU_RATE_YOUR_CURRENT_LEVEL_OF_DAILY_ACTIVITY?: NEARLY NORMAL
GIVING WAY, BUCKLING OR SHIFTING OF KNEE: I HAVE THE SYMPTOM BUT IT DOES NOT AFFECT MY ACTIVITY
GO UP STAIRS: ACTIVITY IS MINIMALLY DIFFICULT
HOW_WOULD_YOU_RATE_THE_OVERALL_FUNCTION_OF_YOUR_KNEE_DURING_YOUR_USUAL_DAILY_ACTIVITIES?: NEARLY NORMAL
SIT WITH YOUR KNEE BENT: ACTIVITY IS NOT DIFFICULT
GO DOWN STAIRS: ACTIVITY IS MINIMALLY DIFFICULT
LIMPING: I DO NOT HAVE THE SYMPTOM
KNEEL ON THE FRONT OF YOUR KNEE: ACTIVITY IS SOMEWHAT DIFFICULT
KNEE_ACTIVITY_OF_DAILY_LIVING_SCORE: 85.71

## 2019-12-27 PROBLEM — M25.561 CHRONIC PAIN OF RIGHT KNEE: Status: RESOLVED | Noted: 2019-10-23 | Resolved: 2019-12-27

## 2019-12-27 PROBLEM — G89.29 CHRONIC PAIN OF RIGHT KNEE: Status: RESOLVED | Noted: 2019-10-23 | Resolved: 2019-12-27

## 2019-12-27 NOTE — PROGRESS NOTES
"Subjective:  HPI       Knee Activity of Daily Living Score: 85.71            Objective:  System    Physical Exam    General     ROS    Assessment/Plan:    DISCHARGE REPORT    Progress reporting period is from 10/23/19 to 12/27/19.       SUBJECTIVE  Subjective changes noted by patient: Sola reports her knee continues to get better.  She has no issues at the gym and has not noticed the pain when she steps up onto a curb or stair.  She was able to do a little bit of kneeling with only mild soreness.  She is very pleased with the progress she has made and will continue to work independently with her home and gym programs.    Current pain level is 0/10.     Previous pain level was 0/10(>5/10 pain when irritated).   Changes in function:  Yes, see above.  Adverse reaction to treatment or activity: None    OBJECTIVE  Changes noted in objective findings:  Yes  Bilateral knee PROM full without pain.  Bilateral knee strength 5/5 in flexion and extension.  Bilateral hip strength 5/5 in flexion, extension, abduction, ER.  Good proprioception in single leg stance bilaterally.  Full squat depth without knee pain.  No knee pain with 3-4\" lateral step down or with functional lunge.    ASSESSMENT/PLAN  Updated problem list and treatment plan: Diagnosis 1:  Right knee pain   Decreased strength - home program  Decreased proprioception - home program  STG/LTGs have been met or progress has been made towards goals:  Yes (See Goal flow sheet completed today.)  Assessment of Progress: The patient has met all of their long term goals.  Self Management Plans:  Patient is independent in a home treatment program.  Patient is independent in self management of symptoms.    Sola continues to require the following intervention to meet STG and LTG's:  PT intervention is no longer required to meet STG/LTG.    Recommendations:  Sola is ready to be discharged from therapy and continue her home treatment program independently.    Please refer to " the daily flowsheet for treatment today, total treatment time and time spent performing 1:1 timed codes.

## 2020-01-27 DIAGNOSIS — E11.9 TYPE 2 DIABETES MELLITUS WITHOUT COMPLICATION, WITHOUT LONG-TERM CURRENT USE OF INSULIN (H): ICD-10-CM

## 2020-02-04 RX ORDER — METFORMIN HCL 500 MG
TABLET, EXTENDED RELEASE 24 HR ORAL
Qty: 360 TABLET | Refills: 3 | Status: SHIPPED | OUTPATIENT
Start: 2020-02-04 | End: 2020-02-11

## 2020-02-04 NOTE — TELEPHONE ENCOUNTER
Received refill request for Meformin to [facility]. Full year was sent to [facility] in November. Nurse sent refills to [facility].

## 2020-02-11 ENCOUNTER — OFFICE VISIT (OUTPATIENT)
Dept: INTERNAL MEDICINE | Facility: CLINIC | Age: 55
End: 2020-02-11
Attending: INTERNAL MEDICINE
Payer: COMMERCIAL

## 2020-02-11 VITALS
HEART RATE: 78 BPM | WEIGHT: 235 LBS | BODY MASS INDEX: 36.26 KG/M2 | SYSTOLIC BLOOD PRESSURE: 105 MMHG | DIASTOLIC BLOOD PRESSURE: 75 MMHG

## 2020-02-11 DIAGNOSIS — I10 ESSENTIAL HYPERTENSION: ICD-10-CM

## 2020-02-11 DIAGNOSIS — E11.9 TYPE 2 DIABETES MELLITUS WITHOUT COMPLICATION, WITHOUT LONG-TERM CURRENT USE OF INSULIN (H): ICD-10-CM

## 2020-02-11 DIAGNOSIS — N95.2 ATROPHIC VAGINITIS: ICD-10-CM

## 2020-02-11 DIAGNOSIS — E78.01 HYPERLIPIDEMIA TYPE II: ICD-10-CM

## 2020-02-11 LAB
HBA1C MFR BLD: 6.1 % (ref 0–5.6)
TSH SERPL DL<=0.005 MIU/L-ACNC: 2.74 MU/L (ref 0.4–4)

## 2020-02-11 PROCEDURE — 36415 COLL VENOUS BLD VENIPUNCTURE: CPT | Performed by: INTERNAL MEDICINE

## 2020-02-11 PROCEDURE — 83036 HEMOGLOBIN GLYCOSYLATED A1C: CPT | Performed by: INTERNAL MEDICINE

## 2020-02-11 PROCEDURE — 84443 ASSAY THYROID STIM HORMONE: CPT | Performed by: INTERNAL MEDICINE

## 2020-02-11 PROCEDURE — G0463 HOSPITAL OUTPT CLINIC VISIT: HCPCS | Mod: ZF

## 2020-02-11 RX ORDER — SIMVASTATIN 20 MG
TABLET ORAL
Qty: 90 TABLET | Refills: 3 | Status: SHIPPED | OUTPATIENT
Start: 2020-02-11 | End: 2020-12-16

## 2020-02-11 RX ORDER — HYDROCHLOROTHIAZIDE 12.5 MG/1
12.5 TABLET ORAL DAILY
Qty: 90 TABLET | Refills: 3 | Status: SHIPPED | OUTPATIENT
Start: 2020-02-11 | End: 2021-04-12

## 2020-02-11 RX ORDER — METFORMIN HCL 500 MG
1000 TABLET, EXTENDED RELEASE 24 HR ORAL 2 TIMES DAILY WITH MEALS
Qty: 360 TABLET | Refills: 3 | Status: SHIPPED | OUTPATIENT
Start: 2020-02-11 | End: 2021-01-14

## 2020-02-11 RX ORDER — LOSARTAN POTASSIUM 100 MG/1
100 TABLET ORAL DAILY
Qty: 90 TABLET | Refills: 3 | Status: SHIPPED | OUTPATIENT
Start: 2020-02-11 | End: 2020-12-16

## 2020-02-11 RX ORDER — ESTRADIOL 10 UG/1
INSERT VAGINAL
Qty: 24 TABLET | Refills: 3 | Status: SHIPPED | OUTPATIENT
Start: 2020-02-11 | End: 2021-07-26

## 2020-02-11 ASSESSMENT — ANXIETY QUESTIONNAIRES
5. BEING SO RESTLESS THAT IT IS HARD TO SIT STILL: NOT AT ALL
6. BECOMING EASILY ANNOYED OR IRRITABLE: SEVERAL DAYS
GAD7 TOTAL SCORE: 7
2. NOT BEING ABLE TO STOP OR CONTROL WORRYING: MORE THAN HALF THE DAYS
1. FEELING NERVOUS, ANXIOUS, OR ON EDGE: MORE THAN HALF THE DAYS
3. WORRYING TOO MUCH ABOUT DIFFERENT THINGS: NOT AT ALL
7. FEELING AFRAID AS IF SOMETHING AWFUL MIGHT HAPPEN: MORE THAN HALF THE DAYS

## 2020-02-11 ASSESSMENT — PATIENT HEALTH QUESTIONNAIRE - PHQ9
5. POOR APPETITE OR OVEREATING: NOT AT ALL
SUM OF ALL RESPONSES TO PHQ QUESTIONS 1-9: 2

## 2020-02-11 ASSESSMENT — PAIN SCALES - GENERAL: PAINLEVEL: NO PAIN (0)

## 2020-02-11 NOTE — LETTER
2/11/2020       RE: Sola Silverman  [address] [place] [address]     Dear Colleague,    Thank you for referring your patient, Sola Silverman, to the [facility]  at [facility]. Please see a copy of my visit note below.    HPI  Patient is here for follow up on several issues. She reports that she has been working on weight loss. She has been more physically active. She reports some orthostatic symptoms. SHe is wondering if her medications for blood pressure need to be reduced.     Review of Systems     Constitutional:  Negative for fever and fatigue.   Respiratory:   Negative for cough and dyspnea on exertion.    Cardiovascular:  Negative for chest pain, dyspnea on exertion and edema.   Skin:  Negative for itching and rash.   Neurological:  Positive for dizziness. Negative for tingling, tremors and headaches.   Endo/Heme:  Negative for anemia, swollen glands and bruises/bleeds easily.   Psychiatric/Behavioral:  Negative for depression, decreased concentration, mood swings and panic attacks.    Endocrine:  Negative for altered temperature regulation, polyphagia, polydipsia, unwanted hair growth and change in facial hair.    Current Outpatient Medications   Medication     aspirin 81 MG tablet     blood glucose (ACCU-CHEK SMARTVIEW) test strip     blood glucose (NO BRAND SPECIFIED) lancets standard     blood glucose monitoring (ACCU-CHEK COMPACT CARE KIT) meter device kit     Calcium-Vitamin D (CALCIUM + D PO)     Cetirizine HCl (ZYRTEC PO)     hydrochlorothiazide (HYDRODIURIL) 25 MG tablet     losartan (COZAAR) 100 MG tablet     metFORMIN (GLUCOPHAGE-XR) 500 MG 24 hr tablet     simvastatin (ZOCOR) 20 MG tablet     VITAMIN D, CHOLECALCIFEROL, PO     YUVAFEM 10 MCG TABS vaginal tablet     No current facility-administered medications for this visit.      Facility-Administered Medications Ordered in Other Visits   Medication     lidocaine (PF)  (XYLOCAINE) 1 % injection 10 mL     lidocaine 1% with EPINEPHrine 1:100,000 injection 10 mL     Vitals:    02/11/20 1101 02/11/20 1111 02/11/20 1112 02/11/20 1113   BP: 97/69 107/76 110/79 105/75   Pulse: 78 78 78 78   Weight: 106.6 kg (235 lb)            Physical Exam  Vitals signs and nursing note reviewed.   Constitutional:       Appearance: Normal appearance.   HENT:      Head: Normocephalic and atraumatic.      Mouth/Throat:      Mouth: Mucous membranes are moist.   Neck:      Musculoskeletal: Normal range of motion.   Cardiovascular:      Rate and Rhythm: Normal rate.      Pulses: Normal pulses.   Pulmonary:      Effort: Pulmonary effort is normal.   Abdominal:      Palpations: Abdomen is soft.   Musculoskeletal:         General: No edema.   Neurological:      General: No focal deficit present.      Mental Status: She is alert and oriented to person, place, and time.   Psychiatric:         Mood and Affect: Mood normal.         Behavior: Behavior normal.         Thought Content: Thought content normal.         Judgment: Judgment normal.       Assessment and Plan:  Sola was seen today for follow up.    Diagnoses and all orders for this visit:    Essential hypertension. Blood pressure is low today. Given orthostatic symptoms, recommend reducing the dose of hydrochlorothiazide. Patient was advised to follow up in 3-4 weeks for blood pressure check. She is in agreement with the plan.  -     hydrochlorothiazide (HYDRODIURIL) 12.5 MG tablet; Take 1 tablet (12.5 mg) by mouth daily    Hyperlipidemia type II  -     losartan (COZAAR) 100 MG tablet; Take 1 tablet (100 mg) by mouth daily  -     simvastatin (ZOCOR) 20 MG tablet; TAKE 1 TABLET BY MOUTH  DAILY    Type 2 diabetes mellitus without complication, without long-term current use of insulin (H). Discussed glucemic goals. Recommend checking Hgb A1C today. Patient was also encouraged to continue with lifestyle modifications.   -     metFORMIN (GLUCOPHAGE-XR) 500 MG  24 hr tablet; Take 2 tablets (1,000 mg) by mouth 2 times daily (with meals)  -     Hgb A1c  -     TSH with free T4 reflex    Atrophic vaginitis  -     estradiol (YUVAFEM) 10 MCG TABS vaginal tablet; INSERT 1 TABLET VAGINALLY  TWICE WEEKLY      Total time spent 25 minutes.  More than 50% of the time spent with Ms. Silverman on counseling / coordinating her care    Alda Santamaria MD

## 2020-02-11 NOTE — PROGRESS NOTES
HPI  Patient is here for follow up on several issues. She reports that she has been working on weight loss. She has been more physically active. She reports some orthostatic symptoms. SHe is wondering if her medications for blood pressure need to be reduced.     Review of Systems     Constitutional:  Negative for fever and fatigue.   Respiratory:   Negative for cough and dyspnea on exertion.    Cardiovascular:  Negative for chest pain, dyspnea on exertion and edema.   Skin:  Negative for itching and rash.   Neurological:  Positive for dizziness. Negative for tingling, tremors and headaches.   Endo/Heme:  Negative for anemia, swollen glands and bruises/bleeds easily.   Psychiatric/Behavioral:  Negative for depression, decreased concentration, mood swings and panic attacks.    Endocrine:  Negative for altered temperature regulation, polyphagia, polydipsia, unwanted hair growth and change in facial hair.    Current Outpatient Medications   Medication     aspirin 81 MG tablet     blood glucose (ACCU-CHEK SMARTVIEW) test strip     blood glucose (NO BRAND SPECIFIED) lancets standard     blood glucose monitoring (ACCU-CHEK COMPACT CARE KIT) meter device kit     Calcium-Vitamin D (CALCIUM + D PO)     Cetirizine HCl (ZYRTEC PO)     hydrochlorothiazide (HYDRODIURIL) 25 MG tablet     losartan (COZAAR) 100 MG tablet     metFORMIN (GLUCOPHAGE-XR) 500 MG 24 hr tablet     simvastatin (ZOCOR) 20 MG tablet     VITAMIN D, CHOLECALCIFEROL, PO     YUVAFEM 10 MCG TABS vaginal tablet     No current facility-administered medications for this visit.      Facility-Administered Medications Ordered in Other Visits   Medication     lidocaine (PF) (XYLOCAINE) 1 % injection 10 mL     lidocaine 1% with EPINEPHrine 1:100,000 injection 10 mL     Vitals:    02/11/20 1101 02/11/20 1111 02/11/20 1112 02/11/20 1113   BP: 97/69 107/76 110/79 105/75   Pulse: 78 78 78 78   Weight: 106.6 kg (235 lb)            Physical Exam  Vitals signs and nursing note  reviewed.   Constitutional:       Appearance: Normal appearance.   HENT:      Head: Normocephalic and atraumatic.      Mouth/Throat:      Mouth: Mucous membranes are moist.   Neck:      Musculoskeletal: Normal range of motion.   Cardiovascular:      Rate and Rhythm: Normal rate.      Pulses: Normal pulses.   Pulmonary:      Effort: Pulmonary effort is normal.   Abdominal:      Palpations: Abdomen is soft.   Musculoskeletal:         General: No edema.   Neurological:      General: No focal deficit present.      Mental Status: She is alert and oriented to person, place, and time.   Psychiatric:         Mood and Affect: Mood normal.         Behavior: Behavior normal.         Thought Content: Thought content normal.         Judgment: Judgment normal.       Assessment and Plan:  Sola was seen today for follow up.    Diagnoses and all orders for this visit:    Essential hypertension. Blood pressure is low today. Given orthostatic symptoms, recommend reducing the dose of hydrochlorothiazide. Patient was advised to follow up in 3-4 weeks for blood pressure check. She is in agreement with the plan.  -     hydrochlorothiazide (HYDRODIURIL) 12.5 MG tablet; Take 1 tablet (12.5 mg) by mouth daily    Hyperlipidemia type II  -     losartan (COZAAR) 100 MG tablet; Take 1 tablet (100 mg) by mouth daily  -     simvastatin (ZOCOR) 20 MG tablet; TAKE 1 TABLET BY MOUTH  DAILY    Type 2 diabetes mellitus without complication, without long-term current use of insulin (H). Discussed glucemic goals. Recommend checking Hgb A1C today. Patient was also encouraged to continue with lifestyle modifications.   -     metFORMIN (GLUCOPHAGE-XR) 500 MG 24 hr tablet; Take 2 tablets (1,000 mg) by mouth 2 times daily (with meals)  -     Hgb A1c  -     TSH with free T4 reflex    Atrophic vaginitis  -     estradiol (YUVAFEM) 10 MCG TABS vaginal tablet; INSERT 1 TABLET VAGINALLY  TWICE WEEKLY      Total time spent 25 minutes.  More than 50% of the time  spent with Ms. Silverman on counseling / coordinating her care    Alda Santamaria MD

## 2020-02-11 NOTE — NURSING NOTE
Chief Complaint   Patient presents with     Follow Up     B/P check , medication refill   Gina Parker LPN

## 2020-02-12 ASSESSMENT — ENCOUNTER SYMPTOMS
DIZZINESS: 1
DECREASED CONCENTRATION: 0
TINGLING: 0
POLYDIPSIA: 0
DEPRESSION: 0
HEADACHES: 0
BRUISES/BLEEDS EASILY: 0
NERVOUS/ANXIOUS: 0
INSOMNIA: 0
PANIC: 0
TREMORS: 0
FATIGUE: 0
ALTERED TEMPERATURE REGULATION: 0
SWOLLEN GLANDS: 0
DYSPNEA ON EXERTION: 0
POLYPHAGIA: 0
COUGH: 0
FEVER: 0

## 2020-02-12 ASSESSMENT — ANXIETY QUESTIONNAIRES: GAD7 TOTAL SCORE: 7

## 2020-03-10 ENCOUNTER — HOSPITAL ENCOUNTER (OUTPATIENT)
Dept: GENERAL RADIOLOGY | Facility: CLINIC | Age: 55
End: 2020-03-10
Attending: INTERNAL MEDICINE
Payer: COMMERCIAL

## 2020-03-10 ENCOUNTER — OFFICE VISIT (OUTPATIENT)
Dept: INTERNAL MEDICINE | Facility: CLINIC | Age: 55
End: 2020-03-10
Attending: INTERNAL MEDICINE
Payer: COMMERCIAL

## 2020-03-10 VITALS
BODY MASS INDEX: 36.57 KG/M2 | DIASTOLIC BLOOD PRESSURE: 82 MMHG | WEIGHT: 237 LBS | SYSTOLIC BLOOD PRESSURE: 127 MMHG | HEART RATE: 93 BPM

## 2020-03-10 DIAGNOSIS — R05.9 COUGH: ICD-10-CM

## 2020-03-10 DIAGNOSIS — R09.82 POSTNASAL DRIP: ICD-10-CM

## 2020-03-10 DIAGNOSIS — R05.9 COUGH: Primary | ICD-10-CM

## 2020-03-10 PROCEDURE — 71046 X-RAY EXAM CHEST 2 VIEWS: CPT

## 2020-03-10 PROCEDURE — G0463 HOSPITAL OUTPT CLINIC VISIT: HCPCS | Mod: 25,ZF

## 2020-03-10 RX ORDER — FLUTICASONE PROPIONATE 50 MCG
1 SPRAY, SUSPENSION (ML) NASAL DAILY
Qty: 16 G | Refills: 4 | Status: SHIPPED | OUTPATIENT
Start: 2020-03-10 | End: 2021-04-23

## 2020-03-10 NOTE — PROGRESS NOTES
HPI  Patient is here for evaluation of cough. She reports that her cough has started in December around [place] when she had influenza. She states that initially her cough was dry. More recently she developed productive cough, chest pressure. She denies shortness of breath at rest and with exertion. She states that she started to feel worse last week. She has some nasal congestion, however, has no sinus pressure. She has some postnasal drainage. She started taking Nyquil. She was considering sleeping in a recliner.      Review of Systems     Constitutional:  Positive for fatigue. Negative for fever and chills.   HENT:  Positive for sinus congestion. Negative for ear pain.    Eyes:  Negative for decreased vision.   Respiratory:   Positive for cough. Negative for dyspnea on exertion.    Cardiovascular:  Negative for chest pain, dyspnea on exertion and edema.   Gastrointestinal:  Negative for abdominal pain, diarrhea and constipation.   Genitourinary:  Negative for dysuria.   Skin:  Negative for itching and rash.   Endo/Heme:  Negative for anemia, swollen glands and bruises/bleeds easily.   Psychiatric/Behavioral:  Negative for depression, decreased concentration, mood swings and panic attacks.    Endocrine:  Negative for altered temperature regulation, polyphagia, polydipsia, unwanted hair growth and change in facial hair.    Current Outpatient Medications   Medication     aspirin 81 MG tablet     blood glucose (ACCU-CHEK SMARTVIEW) test strip     blood glucose (NO BRAND SPECIFIED) lancets standard     blood glucose monitoring (ACCU-CHEK COMPACT CARE KIT) meter device kit     Calcium-Vitamin D (CALCIUM + D PO)     Cetirizine HCl (ZYRTEC PO)     estradiol (YUVAFEM) 10 MCG TABS vaginal tablet     hydrochlorothiazide (HYDRODIURIL) 12.5 MG tablet     losartan (COZAAR) 100 MG tablet     metFORMIN (GLUCOPHAGE-XR) 500 MG 24 hr tablet     simvastatin (ZOCOR) 20 MG tablet     VITAMIN D, CHOLECALCIFEROL, PO     No current  facility-administered medications for this visit.      Facility-Administered Medications Ordered in Other Visits   Medication     lidocaine (PF) (XYLOCAINE) 1 % injection 10 mL     lidocaine 1% with EPINEPHrine 1:100,000 injection 10 mL     Vitals:    03/10/20 1022 03/10/20 1023 03/10/20 1024   BP: 132/82 121/82 127/82   Pulse: 93 93 93   Weight: 107.5 kg (237 lb)           Physical Exam  Vitals signs and nursing note reviewed.   Constitutional:       Appearance: Normal appearance.   HENT:      Head: Normocephalic and atraumatic.      Nose: Congestion and rhinorrhea present.      Mouth/Throat:      Mouth: Mucous membranes are moist.      Pharynx: Oropharynx is clear.   Neck:      Musculoskeletal: Normal range of motion and neck supple.   Cardiovascular:      Rate and Rhythm: Normal rate and regular rhythm.   Pulmonary:      Effort: Pulmonary effort is normal.      Breath sounds: Normal breath sounds.   Musculoskeletal: Normal range of motion.         General: No edema.   Skin:     General: Skin is warm and dry.   Neurological:      Mental Status: She is alert.   Psychiatric:         Mood and Affect: Mood normal.         Behavior: Behavior normal.         Thought Content: Thought content normal.         Judgment: Judgment normal.       Assessment and Plan:  Sola was seen today for follow up.    Diagnoses and all orders for this visit:    Cough. Discussed possible causes of cough. Recommend evaluation for lung pathology. Patient will be advised on test results accordingly.   -     XR Chest 2 Views; Future    Postnasal drip. Reviewed likely causes of cough, including postnasal drip. Patient was given prescription for Flonase. Recommend follow up in 2-3 weeks if symptoms do not improve or sooner with worsening.   -     fluticasone (FLONASE) 50 MCG/ACT nasal spray; Spray 1 spray into both nostrils daily    Total time spent 25 minutes.  More than 50% of the time spent with Ms. Silverman on counseling / coordinating her  keeley Santamaria MD

## 2020-03-10 NOTE — LETTER
3/10/2020       RE: Sola Silverman  [address] [place] [address]     Dear Colleague,    Thank you for referring your patient, Sola Silverman, to the [facility]  at [facility]. Please see a copy of my visit note below.    HPI  Patient is here for evaluation of cough. She reports that her cough has started in December around [place] when she had influenza. She states that initially her cough was dry. More recently she developed productive cough, chest pressure. She denies shortness of breath at rest and with exertion. She states that she started to feel worse last week. She has some nasal congestion, however, has no sinus pressure. She has some postnasal drainage. She started taking Nyquil. She was considering sleeping in a recliner.      Review of Systems     Constitutional:  Positive for fatigue. Negative for fever and chills.   HENT:  Positive for sinus congestion. Negative for ear pain.    Eyes:  Negative for decreased vision.   Respiratory:   Positive for cough. Negative for dyspnea on exertion.    Cardiovascular:  Negative for chest pain, dyspnea on exertion and edema.   Gastrointestinal:  Negative for abdominal pain, diarrhea and constipation.   Genitourinary:  Negative for dysuria.   Skin:  Negative for itching and rash.   Endo/Heme:  Negative for anemia, swollen glands and bruises/bleeds easily.   Psychiatric/Behavioral:  Negative for depression, decreased concentration, mood swings and panic attacks.    Endocrine:  Negative for altered temperature regulation, polyphagia, polydipsia, unwanted hair growth and change in facial hair.    Current Outpatient Medications   Medication     aspirin 81 MG tablet     blood glucose (ACCU-CHEK SMARTVIEW) test strip     blood glucose (NO BRAND SPECIFIED) lancets standard     blood glucose monitoring (ACCU-CHEK COMPACT CARE KIT) meter device kit     Calcium-Vitamin D (CALCIUM + D PO)     Cetirizine  HCl (ZYRTEC PO)     estradiol (YUVAFEM) 10 MCG TABS vaginal tablet     hydrochlorothiazide (HYDRODIURIL) 12.5 MG tablet     losartan (COZAAR) 100 MG tablet     metFORMIN (GLUCOPHAGE-XR) 500 MG 24 hr tablet     simvastatin (ZOCOR) 20 MG tablet     VITAMIN D, CHOLECALCIFEROL, PO     No current facility-administered medications for this visit.      Facility-Administered Medications Ordered in Other Visits   Medication     lidocaine (PF) (XYLOCAINE) 1 % injection 10 mL     lidocaine 1% with EPINEPHrine 1:100,000 injection 10 mL     Vitals:    03/10/20 1022 03/10/20 1023 03/10/20 1024   BP: 132/82 121/82 127/82   Pulse: 93 93 93   Weight: 107.5 kg (237 lb)           Physical Exam  Vitals signs and nursing note reviewed.   Constitutional:       Appearance: Normal appearance.   HENT:      Head: Normocephalic and atraumatic.      Nose: Congestion and rhinorrhea present.      Mouth/Throat:      Mouth: Mucous membranes are moist.      Pharynx: Oropharynx is clear.   Neck:      Musculoskeletal: Normal range of motion and neck supple.   Cardiovascular:      Rate and Rhythm: Normal rate and regular rhythm.   Pulmonary:      Effort: Pulmonary effort is normal.      Breath sounds: Normal breath sounds.   Musculoskeletal: Normal range of motion.         General: No edema.   Skin:     General: Skin is warm and dry.   Neurological:      Mental Status: She is alert.   Psychiatric:         Mood and Affect: Mood normal.         Behavior: Behavior normal.         Thought Content: Thought content normal.         Judgment: Judgment normal.       Assessment and Plan:  Sola was seen today for follow up.    Diagnoses and all orders for this visit:    Cough. Discussed possible causes of cough. Recommend evaluation for lung pathology. Patient will be advised on test results accordingly.   -     XR Chest 2 Views; Future    Postnasal drip. Reviewed likely causes of cough, including postnasal drip. Patient was given prescription for Flonase.  Recommend follow up in 2-3 weeks if symptoms do not improve or sooner with worsening.   -     fluticasone (FLONASE) 50 MCG/ACT nasal spray; Spray 1 spray into both nostrils daily    Total time spent 25 minutes.  More than 50% of the time spent with Ms. Silverman on counseling / coordinating her care    Alda Santamaria MD

## 2020-03-15 ASSESSMENT — ENCOUNTER SYMPTOMS
CONSTIPATION: 0
POLYPHAGIA: 0
DEPRESSION: 0
ALTERED TEMPERATURE REGULATION: 0
SINUS CONGESTION: 1
DYSURIA: 0
DYSPNEA ON EXERTION: 0
INSOMNIA: 0
ABDOMINAL PAIN: 0
FEVER: 0
COUGH: 1
BRUISES/BLEEDS EASILY: 0
DIARRHEA: 0
FATIGUE: 1
CHILLS: 0
PANIC: 0
DECREASED CONCENTRATION: 0
SWOLLEN GLANDS: 0
NERVOUS/ANXIOUS: 0
POLYDIPSIA: 0

## 2020-03-24 ENCOUNTER — TELEPHONE (OUTPATIENT)
Dept: OBGYN | Facility: CLINIC | Age: 55
End: 2020-03-24

## 2020-03-24 DIAGNOSIS — J01.90 ACUTE SINUSITIS WITH SYMPTOMS > 10 DAYS: Primary | ICD-10-CM

## 2020-03-24 NOTE — TELEPHONE ENCOUNTER
Received message on triage voicemail from Sola stating that she was in clinic 2 weeks ago with Dr. Santamaria and was supposed to call in 2 weeks to get antibiotic if her symptoms are not better.   She is calling for antibiotic.    Forwarding to Dr. Santamaria.

## 2020-03-24 NOTE — TELEPHONE ENCOUNTER
Discussed symptoms with patient. She reports that she continues to have cough. She continues to have postnasal drip. She has been using saline for nasal irrigation as well as Flonase and Benadryl at night. She denies fever, chills or night sweats.   Discussed symptoms, recommend treatment with antibiotics. Patient was advised to continue with symptomatic management in addition to antibiotic therapy.   She is in agreement with the plan.   Alda Santamaria MD

## 2020-07-23 ENCOUNTER — ANCILLARY PROCEDURE (OUTPATIENT)
Dept: MAMMOGRAPHY | Facility: CLINIC | Age: 55
End: 2020-07-23
Attending: INTERNAL MEDICINE
Payer: COMMERCIAL

## 2020-07-23 DIAGNOSIS — Z12.31 VISIT FOR SCREENING MAMMOGRAM: ICD-10-CM

## 2020-07-24 ENCOUNTER — OFFICE VISIT (OUTPATIENT)
Dept: INTERNAL MEDICINE | Facility: CLINIC | Age: 55
End: 2020-07-24
Attending: INTERNAL MEDICINE
Payer: COMMERCIAL

## 2020-07-24 VITALS
DIASTOLIC BLOOD PRESSURE: 80 MMHG | SYSTOLIC BLOOD PRESSURE: 116 MMHG | HEIGHT: 68 IN | HEART RATE: 76 BPM | BODY MASS INDEX: 36.53 KG/M2 | WEIGHT: 241 LBS

## 2020-07-24 DIAGNOSIS — Z12.11 COLON CANCER SCREENING: Primary | ICD-10-CM

## 2020-07-24 DIAGNOSIS — Z00.00 PREVENTATIVE HEALTH CARE: ICD-10-CM

## 2020-07-24 DIAGNOSIS — E11.9 TYPE 2 DIABETES MELLITUS WITHOUT COMPLICATION, WITHOUT LONG-TERM CURRENT USE OF INSULIN (H): ICD-10-CM

## 2020-07-24 PROCEDURE — 90472 IMMUNIZATION ADMIN EACH ADD: CPT | Mod: ZF

## 2020-07-24 PROCEDURE — 25000581 ZZH RX MED A9270 GY (STAT IND- M) 250: Mod: ZF

## 2020-07-24 PROCEDURE — 90750 HZV VACC RECOMBINANT IM: CPT | Mod: ZF

## 2020-07-24 PROCEDURE — G0010 ADMIN HEPATITIS B VACCINE: HCPCS

## 2020-07-24 PROCEDURE — 90471 IMMUNIZATION ADMIN: CPT | Mod: ZF

## 2020-07-24 PROCEDURE — G0463 HOSPITAL OUTPT CLINIC VISIT: HCPCS | Mod: 25

## 2020-07-24 PROCEDURE — 87624 HPV HI-RISK TYP POOLED RSLT: CPT | Performed by: INTERNAL MEDICINE

## 2020-07-24 PROCEDURE — G0145 SCR C/V CYTO,THINLAYER,RESCR: HCPCS | Performed by: INTERNAL MEDICINE

## 2020-07-24 PROCEDURE — 90746 HEPB VACCINE 3 DOSE ADULT IM: CPT | Mod: ZF

## 2020-07-24 PROCEDURE — 25000128 H RX IP 250 OP 636: Mod: ZF

## 2020-07-24 ASSESSMENT — ANXIETY QUESTIONNAIRES
7. FEELING AFRAID AS IF SOMETHING AWFUL MIGHT HAPPEN: SEVERAL DAYS
3. WORRYING TOO MUCH ABOUT DIFFERENT THINGS: NOT AT ALL
6. BECOMING EASILY ANNOYED OR IRRITABLE: SEVERAL DAYS
IF YOU CHECKED OFF ANY PROBLEMS ON THIS QUESTIONNAIRE, HOW DIFFICULT HAVE THESE PROBLEMS MADE IT FOR YOU TO DO YOUR WORK, TAKE CARE OF THINGS AT HOME, OR GET ALONG WITH OTHER PEOPLE: NOT DIFFICULT AT ALL
2. NOT BEING ABLE TO STOP OR CONTROL WORRYING: SEVERAL DAYS
5. BEING SO RESTLESS THAT IT IS HARD TO SIT STILL: NOT AT ALL
1. FEELING NERVOUS, ANXIOUS, OR ON EDGE: SEVERAL DAYS
GAD7 TOTAL SCORE: 4

## 2020-07-24 ASSESSMENT — ENCOUNTER SYMPTOMS
NERVOUS/ANXIOUS: 1
POOR WOUND HEALING: 0
JOINT SWELLING: 1
DECREASED CONCENTRATION: 0
ARTHRALGIAS: 1
PANIC: 0
SKIN CHANGES: 1
DEPRESSION: 1
MUSCLE CRAMPS: 0
STIFFNESS: 1
MYALGIAS: 0
INSOMNIA: 1
NECK PAIN: 0
NAIL CHANGES: 0
BACK PAIN: 0
MUSCLE WEAKNESS: 1

## 2020-07-24 ASSESSMENT — PATIENT HEALTH QUESTIONNAIRE - PHQ9
SUM OF ALL RESPONSES TO PHQ QUESTIONS 1-9: 4
5. POOR APPETITE OR OVEREATING: NOT AT ALL

## 2020-07-24 ASSESSMENT — PAIN SCALES - GENERAL: PAINLEVEL: NO PAIN (0)

## 2020-07-24 ASSESSMENT — MIFFLIN-ST. JEOR: SCORE: 1733.73

## 2020-07-24 NOTE — LETTER
7/24/2020       RE: Sola Silverman  [address] [place] [address]     Dear Colleague,    Thank you for referring your patient, Sola Silverman, to the [facility]  at [facility]. Please see a copy of my visit note below.     SUBJECTIVE:   CC: Sola Silverman is an 54 year old woman who presents for preventive health visit.     Healthy Habits:    Do you get at least three servings of calcium containing foods daily (dairy, green leafy vegetables, etc.)? yes    Amount of exercise or daily activities, outside of work: not regular    Problems taking medications regularly No    Medication side effects: No    Have you had an eye exam in the past two years? yes    Do you see a dentist twice per year? yes    Do you have sleep apnea, excessive snoring or daytime drowsiness?no      -------------------------------------    Today's PHQ-2 Score:   PHQ-2 ( 1999 [employer]) 7/24/2020 2/11/2020   Q1: Little interest or pleasure in doing things 0 0   Q2: Feeling down, depressed or hopeless 1 1   PHQ-2 Score 1 1   Q1: Little interest or pleasure in doing things - -   Q2: Feeling down, depressed or hopeless - -   PHQ-2 Score - -       Abuse: Current or Past(Physical, Sexual or Emotional)- No  Do you feel safe in your environment? Yes        Social History     Tobacco Use     Smoking status: Never Smoker     Smokeless tobacco: Never Used   Substance Use Topics     Alcohol use: Yes     Comment: rare     If you drink alcohol do you typically have >3 drinks per day or >7 drinks per week? No                     Reviewed orders with patient.  Reviewed health maintenance and updated orders accordingly - Yes  Labs reviewed in EPIC    Mammogram Screening: Patient over age 50, mutual decision to screen reflected in health maintenance.    Pertinent mammograms are reviewed under the imaging tab.  History of abnormal Pap smear: NO - age 30-65 PAP every 5 years with negative HPV  co-testing recommended  PAP / HPV Latest Ref Rng & Units 6/15/2015 1/4/2012   PAP - NIL NIL   HPV 16 DNA NEG Negative -   HPV 18 DNA NEG Negative -   OTHER HR HPV NEG Negative -     Reviewed and updated as needed this visit by clinical staff  Tobacco  Allergies  Meds         Reviewed and updated as needed this visit by Provider        Past Medical History:   Diagnosis Date     Allergy      Ankle joint pain      BRCA2 positive      Depression      Diabetes (H)      Hyperlipidemia      Hypertension      Lumbago      Morbid obesity (H)       Past Surgical History:   Procedure Laterality Date     ABDOMEN SURGERY       EXCISE MASS LOWER EXTREMITY  4/11/2011    Procedure:EXCISE MASS LOWER EXTREMITY; Wound Mass; Surgeon:DREW LAURA; Location:[facility] OR     FOOT SURGERY  2000    left     KNEE SURGERY  1986    left     SALPINGO-OOPHORECTOMY BILATERAL         ROS:  Review of Systems     Constitutional:  Negative for fever, chills, weight loss, weight gain, fatigue, decreased appetite, night sweats, recent stressors, height gain, height loss, post-operative complications, incisional pain, hallucinations, increased energy, hyperactivity and confused.   HENT:  Negative for ear pain, hearing loss, tinnitus, nosebleeds, trouble swallowing, hoarse voice, mouth sores, sore throat, ear discharge, tooth pain, gum tenderness, taste disturbance, smell disturbance, hearing aid, bleeding gums, dry mouth, sinus pain, sinus congestion and neck mass.    Eyes:  Negative for double vision, pain, redness, eye pain, decreased vision, eye watering, eye bulging, eye dryness, flashing lights, spots, floaters, strabismus, tunnel vision, jaundice and eye irritation.   Respiratory:   Negative for cough, hemoptysis, sputum production, shortness of breath, wheezing, sleep disturbances due to breathing, snores loudly, respiratory pain, dyspnea on exertion, cough disturbing sleep and postural dyspnea.    Cardiovascular:  Negative for chest  pain, dyspnea on exertion, palpitations, orthopnea, claudication, leg swelling, fingers/toes turn blue, hypertension, hypotension, syncope, history of heart murmur, chest pain on exertion, chest pain at rest, pacemaker, few scattered varicosities, leg pain, sleep disturbances due to breathing, tachycardia, light-headedness, exercise intolerance and edema.   Gastrointestinal:  Negative for heartburn, nausea, vomiting, abdominal pain, diarrhea, constipation, blood in stool, melena, rectal pain, bloating, hemorrhoids, bowel incontinence, jaundice, rectal bleeding, coffee ground emesis and change in stool.   Genitourinary:  Negative for bladder incontinence, dysuria, urgency, hematuria, flank pain, vaginal discharge, difficulty urinating, genital sores, dyspareunia, decreased libido, nocturia, voiding less frequently, arousal difficulty, abnormal vaginal bleeding, excessive menstruation, menstrual changes, hot flashes, vaginal dryness and postmenopausal bleeding.   Musculoskeletal:  Positive for joint swelling, arthralgias, stiffness and muscle weakness. Negative for myalgias, back pain, muscle cramps, neck pain, bone pain and fracture.   Skin:  Positive for hair changes, skin changes and warts. Negative for nail changes, itching, poor wound healing, rash, acne, poor wound healing, scarring, flaky skin, Raynaud's phenomenon, sensitivity to sunlight and skin thickening.   Neurological:  Negative for dizziness, tingling, tremors, speech change, seizures, loss of consciousness, weakness, light-headedness, numbness, headaches, disturbances in coordination, extremity numbness, memory loss, difficulty walking and paralysis.   Endo/Heme:  Negative for anemia, swollen glands and bruises/bleeds easily.   Psychiatric/Behavioral:  Positive for depression. Negative for hallucinations, memory loss, decreased concentration, mood swings and panic attacks.    Breast:  Negative for breast discharge, breast mass, breast pain and nipple  "retraction.   Endocrine:  Negative for altered temperature regulation, polyphagia, polydipsia, unwanted hair growth and change in facial hair.        OBJECTIVE:   /80   Pulse 76   Ht 1.715 m (5' 7.5\")   Wt 109.3 kg (241 lb)   Breastfeeding No   BMI 37.19 kg/m    EXAM:  GENERAL APPEARANCE: healthy, alert and no distress  EYES: Eyes grossly normal to inspection, PERRL and conjunctivae and sclerae normal  HENT: ear canals and TM's normal, nose and mouth without ulcers or lesions, oropharynx clear and oral mucous membranes moist  NECK: no adenopathy, no asymmetry, masses, or scars and thyroid normal to palpation  RESP: lungs clear to auscultation - no rales, rhonchi or wheezes  BREAST: normal without masses, tenderness or nipple discharge and no palpable axillary masses or adenopathy  CV: regular rate and rhythm, normal S1 S2, no S3 or S4, no murmur, click or rub, no peripheral edema and peripheral pulses strong  ABDOMEN: soft, nontender, no hepatosplenomegaly, no masses and bowel sounds normal  MS: no musculoskeletal defects are noted and gait is age appropriate without ataxia  SKIN: no suspicious lesions or rashes  NEURO: Normal strength and tone, sensory exam grossly normal, mentation intact and speech normal  PSYCH: mentation appears normal and affect normal/bright    Diagnostic Test Results:  Labs reviewed in Epic    ASSESSMENT/PLAN:   1. Colon cancer screening  Discussed colon cancer screening. Recommend colonoscopy.   - GASTROENTEROLOGY ADULT REF PROCEDURE ONLY; Future    2. Preventative health care  PAP smear was collected today. Reviewed screening for hyperlipidemia, recommend checking lipid panel.   - Obtaining, preparing and conveyance of cervical or vaginal smear to laboratory.  - Pap imaged thin layer screen with HPV - recommended age 30 - 65 years (select HPV order below)  - HPV High Risk Types DNA Cervical  - Hemoglobin A1c; Future  - Comprehensive metabolic panel; Future  - Lipid Profile; " "Future    3. Type 2 diabetes mellitus without complication, without long-term current use of insulin (H)  Discussed goals of diabetes care. Will check for microalbuminuria today.   - Routine UA with micro reflex to culture; Future  - Albumin Random Urine Quantitative with Creat Ratio; Future    COUNSELING:   Reviewed preventive health counseling, as reflected in patient instructions       Regular exercise       Healthy diet/nutrition       Vision screening    Estimated body mass index is 37.19 kg/m  as calculated from the following:    Height as of this encounter: 1.715 m (5' 7.5\").    Weight as of this encounter: 109.3 kg (241 lb).         reports that she has never smoked. She has never used smokeless tobacco.      Counseling Resources:  ATP IV Guidelines  Pooled Cohorts Equation Calculator  Breast Cancer Risk Calculator  FRAX Risk Assessment  ICSI Preventive Guidelines  Dietary Guidelines for Americans, 2010  USDA's MyPlate  ASA Prophylaxis  Lung CA Screening    Alda Santamaria MD  [facility]       "

## 2020-07-24 NOTE — PROGRESS NOTES
SUBJECTIVE:   CC: Sola Silverman is an 54 year old woman who presents for preventive health visit.     Healthy Habits:    Do you get at least three servings of calcium containing foods daily (dairy, green leafy vegetables, etc.)? yes    Amount of exercise or daily activities, outside of work: not regular    Problems taking medications regularly No    Medication side effects: No    Have you had an eye exam in the past two years? yes    Do you see a dentist twice per year? yes    Do you have sleep apnea, excessive snoring or daytime drowsiness?no      -------------------------------------    Today's PHQ-2 Score:   PHQ-2 ( 1999 [employer]) 7/24/2020 2/11/2020   Q1: Little interest or pleasure in doing things 0 0   Q2: Feeling down, depressed or hopeless 1 1   PHQ-2 Score 1 1   Q1: Little interest or pleasure in doing things - -   Q2: Feeling down, depressed or hopeless - -   PHQ-2 Score - -       Abuse: Current or Past(Physical, Sexual or Emotional)- No  Do you feel safe in your environment? Yes        Social History     Tobacco Use     Smoking status: Never Smoker     Smokeless tobacco: Never Used   Substance Use Topics     Alcohol use: Yes     Comment: rare     If you drink alcohol do you typically have >3 drinks per day or >7 drinks per week? No                     Reviewed orders with patient.  Reviewed health maintenance and updated orders accordingly - Yes  Labs reviewed in [facility]    Mammogram Screening: Patient over age 50, mutual decision to screen reflected in health maintenance.    Pertinent mammograms are reviewed under the imaging tab.  History of abnormal Pap smear: NO - age 30-65 PAP every 5 years with negative HPV co-testing recommended  PAP / HPV Latest Ref Rng & Units 6/15/2015 1/4/2012   PAP - NIL NIL   HPV 16 DNA NEG Negative -   HPV 18 DNA NEG Negative -   OTHER HR HPV NEG Negative -     Reviewed and updated as needed this visit by clinical staff  Tobacco  Allergies  Meds         Reviewed and  updated as needed this visit by Provider        Past Medical History:   Diagnosis Date     Allergy      Ankle joint pain      BRCA2 positive      Depression      Diabetes (H)      Hyperlipidemia      Hypertension      Lumbago      Morbid obesity (H)       Past Surgical History:   Procedure Laterality Date     ABDOMEN SURGERY       EXCISE MASS LOWER EXTREMITY  4/11/2011    Procedure:EXCISE MASS LOWER EXTREMITY; Wound Mass; Surgeon:DREW LAURA; Location:[facility]     FOOT SURGERY  2000    left     KNEE SURGERY  1986    left     SALPINGO-OOPHORECTOMY BILATERAL         ROS:  Review of Systems     Constitutional:  Negative for fever, chills, weight loss, weight gain, fatigue, decreased appetite, night sweats, recent stressors, height gain, height loss, post-operative complications, incisional pain, hallucinations, increased energy, hyperactivity and confused.   HENT:  Negative for ear pain, hearing loss, tinnitus, nosebleeds, trouble swallowing, hoarse voice, mouth sores, sore throat, ear discharge, tooth pain, gum tenderness, taste disturbance, smell disturbance, hearing aid, bleeding gums, dry mouth, sinus pain, sinus congestion and neck mass.    Eyes:  Negative for double vision, pain, redness, eye pain, decreased vision, eye watering, eye bulging, eye dryness, flashing lights, spots, floaters, strabismus, tunnel vision, jaundice and eye irritation.   Respiratory:   Negative for cough, hemoptysis, sputum production, shortness of breath, wheezing, sleep disturbances due to breathing, snores loudly, respiratory pain, dyspnea on exertion, cough disturbing sleep and postural dyspnea.    Cardiovascular:  Negative for chest pain, dyspnea on exertion, palpitations, orthopnea, claudication, leg swelling, fingers/toes turn blue, hypertension, hypotension, syncope, history of heart murmur, chest pain on exertion, chest pain at rest, pacemaker, few scattered varicosities, leg pain, sleep disturbances due to breathing,  "tachycardia, light-headedness, exercise intolerance and edema.   Gastrointestinal:  Negative for heartburn, nausea, vomiting, abdominal pain, diarrhea, constipation, blood in stool, melena, rectal pain, bloating, hemorrhoids, bowel incontinence, jaundice, rectal bleeding, coffee ground emesis and change in stool.   Genitourinary:  Negative for bladder incontinence, dysuria, urgency, hematuria, flank pain, vaginal discharge, difficulty urinating, genital sores, dyspareunia, decreased libido, nocturia, voiding less frequently, arousal difficulty, abnormal vaginal bleeding, excessive menstruation, menstrual changes, hot flashes, vaginal dryness and postmenopausal bleeding.   Musculoskeletal:  Positive for joint swelling, arthralgias, stiffness and muscle weakness. Negative for myalgias, back pain, muscle cramps, neck pain, bone pain and fracture.   Skin:  Positive for hair changes, skin changes and warts. Negative for nail changes, itching, poor wound healing, rash, acne, poor wound healing, scarring, flaky skin, Raynaud's phenomenon, sensitivity to sunlight and skin thickening.   Neurological:  Negative for dizziness, tingling, tremors, speech change, seizures, loss of consciousness, weakness, light-headedness, numbness, headaches, disturbances in coordination, extremity numbness, memory loss, difficulty walking and paralysis.   Endo/Heme:  Negative for anemia, swollen glands and bruises/bleeds easily.   Psychiatric/Behavioral:  Positive for depression. Negative for hallucinations, memory loss, decreased concentration, mood swings and panic attacks.    Breast:  Negative for breast discharge, breast mass, breast pain and nipple retraction.   Endocrine:  Negative for altered temperature regulation, polyphagia, polydipsia, unwanted hair growth and change in facial hair.        OBJECTIVE:   /80   Pulse 76   Ht 1.715 m (5' 7.5\")   Wt 109.3 kg (241 lb)   Breastfeeding No   BMI 37.19 kg/m    EXAM:  GENERAL " APPEARANCE: healthy, alert and no distress  EYES: Eyes grossly normal to inspection, PERRL and conjunctivae and sclerae normal  HENT: ear canals and TM's normal, nose and mouth without ulcers or lesions, oropharynx clear and oral mucous membranes moist  NECK: no adenopathy, no asymmetry, masses, or scars and thyroid normal to palpation  RESP: lungs clear to auscultation - no rales, rhonchi or wheezes  BREAST: normal without masses, tenderness or nipple discharge and no palpable axillary masses or adenopathy  CV: regular rate and rhythm, normal S1 S2, no S3 or S4, no murmur, click or rub, no peripheral edema and peripheral pulses strong  ABDOMEN: soft, nontender, no hepatosplenomegaly, no masses and bowel sounds normal  MS: no musculoskeletal defects are noted and gait is age appropriate without ataxia  SKIN: no suspicious lesions or rashes  NEURO: Normal strength and tone, sensory exam grossly normal, mentation intact and speech normal  PSYCH: mentation appears normal and affect normal/bright    Diagnostic Test Results:  Labs reviewed in Epic    ASSESSMENT/PLAN:   1. Colon cancer screening  Discussed colon cancer screening. Recommend colonoscopy.   - GASTROENTEROLOGY ADULT REF PROCEDURE ONLY; Future    2. Preventative health care  PAP smear was collected today. Reviewed screening for hyperlipidemia, recommend checking lipid panel.   - Obtaining, preparing and conveyance of cervical or vaginal smear to laboratory.  - Pap imaged thin layer screen with HPV - recommended age 30 - 65 years (select HPV order below)  - HPV High Risk Types DNA Cervical  - Hemoglobin A1c; Future  - Comprehensive metabolic panel; Future  - Lipid Profile; Future    3. Type 2 diabetes mellitus without complication, without long-term current use of insulin (H)  Discussed goals of diabetes care. Will check for microalbuminuria today.   - Routine UA with micro reflex to culture; Future  - Albumin Random Urine Quantitative with Creat Ratio;  "Future    COUNSELING:   Reviewed preventive health counseling, as reflected in patient instructions       Regular exercise       Healthy diet/nutrition       Vision screening    Estimated body mass index is 37.19 kg/m  as calculated from the following:    Height as of this encounter: 1.715 m (5' 7.5\").    Weight as of this encounter: 109.3 kg (241 lb).         reports that she has never smoked. She has never used smokeless tobacco.      Counseling Resources:  ATP IV Guidelines  Pooled Cohorts Equation Calculator  Breast Cancer Risk Calculator  FRAX Risk Assessment  ICSI Preventive Guidelines  Dietary Guidelines for Americans, 2010  USDA's MyPlate  ASA Prophylaxis  Lung CA Screening    Alda Santamaria MD  [facility]   "

## 2020-07-25 ASSESSMENT — ANXIETY QUESTIONNAIRES: GAD7 TOTAL SCORE: 4

## 2020-07-26 ASSESSMENT — ENCOUNTER SYMPTOMS
DISTURBANCES IN COORDINATION: 0
LEG PAIN: 0
EXERCISE INTOLERANCE: 0
TACHYCARDIA: 0
NIGHT SWEATS: 0
HOARSE VOICE: 0
MUSCLE WEAKNESS: 1
SPUTUM PRODUCTION: 0
DIZZINESS: 0
RESPIRATORY PAIN: 0
LEG SWELLING: 0
TINGLING: 0
HEMATURIA: 0
DIARRHEA: 0
NAUSEA: 0
DEPRESSION: 1
HEMOPTYSIS: 0
POOR WOUND HEALING: 0
DYSPNEA ON EXERTION: 0
SLEEP DISTURBANCES DUE TO BREATHING: 0
MYALGIAS: 0
TROUBLE SWALLOWING: 0
JAUNDICE: 0
STIFFNESS: 1
FLANK PAIN: 0
CLAUDICATION: 0
EYE REDNESS: 0
EYE WATERING: 0
ORTHOPNEA: 0
BREAST MASS: 0
MEMORY LOSS: 0
CHILLS: 0
SMELL DISTURBANCE: 0
BLOATING: 0
LIGHT-HEADEDNESS: 0
DIFFICULTY URINATING: 0
POLYPHAGIA: 0
COUGH DISTURBING SLEEP: 0
NERVOUS/ANXIOUS: 1
INSOMNIA: 1
HYPOTENSION: 0
BREAST PAIN: 0
WHEEZING: 0
DECREASED APPETITE: 0
BACK PAIN: 0
PANIC: 0
EXTREMITY NUMBNESS: 0
EYE IRRITATION: 0
RECTAL PAIN: 0
HYPERTENSION: 0
PARALYSIS: 0
TASTE DISTURBANCE: 0
SINUS PAIN: 0
SORE THROAT: 0
SKIN CHANGES: 1
SHORTNESS OF BREATH: 0
COUGH: 0
HEADACHES: 0
BOWEL INCONTINENCE: 0
HALLUCINATIONS: 0
WEIGHT GAIN: 0
PALPITATIONS: 0
SINUS CONGESTION: 0
DECREASED LIBIDO: 0
BRUISES/BLEEDS EASILY: 0
VOMITING: 0
HOT FLASHES: 0
WEIGHT LOSS: 0
SNORES LOUDLY: 0
ARTHRALGIAS: 1
DOUBLE VISION: 0
POSTURAL DYSPNEA: 0
NAIL CHANGES: 0
SPEECH CHANGE: 0
NUMBNESS: 0
SYNCOPE: 0
HEARTBURN: 0
FEVER: 0
BLOOD IN STOOL: 0
CONSTIPATION: 0
NECK MASS: 0
TREMORS: 0
NECK PAIN: 0
ABDOMINAL PAIN: 0
SWOLLEN GLANDS: 0
POLYDIPSIA: 0
INCREASED ENERGY: 0
SEIZURES: 0
DECREASED CONCENTRATION: 0
LOSS OF CONSCIOUSNESS: 0
MUSCLE CRAMPS: 0
EYE PAIN: 0
WEAKNESS: 0
JOINT SWELLING: 1
RECTAL BLEEDING: 0
DYSURIA: 0
ALTERED TEMPERATURE REGULATION: 0
FATIGUE: 0

## 2020-07-29 LAB
COPATH REPORT: NORMAL
PAP: NORMAL

## 2020-07-31 LAB
FINAL DIAGNOSIS: NORMAL
HPV HR 12 DNA CVX QL NAA+PROBE: NEGATIVE
HPV16 DNA SPEC QL NAA+PROBE: NEGATIVE
HPV18 DNA SPEC QL NAA+PROBE: NEGATIVE
SPECIMEN DESCRIPTION: NORMAL
SPECIMEN SOURCE CVX/VAG CYTO: NORMAL

## 2020-08-28 ENCOUNTER — ALLIED HEALTH/NURSE VISIT (OUTPATIENT)
Dept: OBGYN | Facility: CLINIC | Age: 55
End: 2020-08-28
Attending: INTERNAL MEDICINE
Payer: COMMERCIAL

## 2020-08-28 DIAGNOSIS — E11.9 TYPE 2 DIABETES MELLITUS WITHOUT COMPLICATION, WITHOUT LONG-TERM CURRENT USE OF INSULIN (H): ICD-10-CM

## 2020-08-28 DIAGNOSIS — Z09 NEED FOR IMMUNIZATION FOLLOW-UP: Primary | ICD-10-CM

## 2020-08-28 DIAGNOSIS — Z00.00 PREVENTATIVE HEALTH CARE: ICD-10-CM

## 2020-08-28 LAB
ALBUMIN SERPL-MCNC: 3.8 G/DL (ref 3.4–5)
ALBUMIN UR-MCNC: NEGATIVE MG/DL
ALP SERPL-CCNC: 76 U/L (ref 40–150)
ALT SERPL W P-5'-P-CCNC: 28 U/L (ref 0–50)
ANION GAP SERPL CALCULATED.3IONS-SCNC: 9 MMOL/L (ref 3–14)
APPEARANCE UR: CLEAR
AST SERPL W P-5'-P-CCNC: 18 U/L (ref 0–45)
BILIRUB SERPL-MCNC: 0.4 MG/DL (ref 0.2–1.3)
BILIRUB UR QL STRIP: NEGATIVE
BUN SERPL-MCNC: 13 MG/DL (ref 7–30)
CALCIUM SERPL-MCNC: 9.5 MG/DL (ref 8.5–10.1)
CHLORIDE SERPL-SCNC: 101 MMOL/L (ref 94–109)
CHOLEST SERPL-MCNC: 144 MG/DL
CO2 SERPL-SCNC: 28 MMOL/L (ref 20–32)
COLOR UR AUTO: ABNORMAL
CREAT SERPL-MCNC: 0.93 MG/DL (ref 0.52–1.04)
CREAT UR-MCNC: 54 MG/DL
GFR SERPL CREATININE-BSD FRML MDRD: 69 ML/MIN/{1.73_M2}
GLUCOSE SERPL-MCNC: 117 MG/DL (ref 70–99)
GLUCOSE UR STRIP-MCNC: NEGATIVE MG/DL
HBA1C MFR BLD: 6.1 % (ref 0–5.6)
HDLC SERPL-MCNC: 60 MG/DL
HGB UR QL STRIP: NEGATIVE
KETONES UR STRIP-MCNC: NEGATIVE MG/DL
LDLC SERPL CALC-MCNC: 59 MG/DL
LEUKOCYTE ESTERASE UR QL STRIP: NEGATIVE
MICROALBUMIN UR-MCNC: <5 MG/L
MICROALBUMIN/CREAT UR: NORMAL MG/G CR (ref 0–25)
MUCOUS THREADS #/AREA URNS LPF: PRESENT /LPF
NITRATE UR QL: NEGATIVE
NONHDLC SERPL-MCNC: 84 MG/DL
PH UR STRIP: 6 PH (ref 5–7)
POTASSIUM SERPL-SCNC: 4.4 MMOL/L (ref 3.4–5.3)
PROT SERPL-MCNC: 7.7 G/DL (ref 6.8–8.8)
RBC #/AREA URNS AUTO: <1 /HPF (ref 0–2)
SODIUM SERPL-SCNC: 138 MMOL/L (ref 133–144)
SOURCE: ABNORMAL
SP GR UR STRIP: 1.01 (ref 1–1.03)
SQUAMOUS #/AREA URNS AUTO: <1 /HPF (ref 0–1)
TRIGL SERPL-MCNC: 127 MG/DL
UROBILINOGEN UR STRIP-MCNC: NORMAL MG/DL (ref 0–2)
WBC #/AREA URNS AUTO: 1 /HPF (ref 0–5)

## 2020-08-28 PROCEDURE — 40000269 ZZH STATISTIC NO CHARGE FACILITY FEE: Mod: ZF

## 2020-08-28 PROCEDURE — 83036 HEMOGLOBIN GLYCOSYLATED A1C: CPT | Performed by: INTERNAL MEDICINE

## 2020-08-28 PROCEDURE — 90746 HEPB VACCINE 3 DOSE ADULT IM: CPT | Mod: ZF

## 2020-08-28 PROCEDURE — G0010 ADMIN HEPATITIS B VACCINE: HCPCS

## 2020-08-28 PROCEDURE — 81001 URINALYSIS AUTO W/SCOPE: CPT | Performed by: INTERNAL MEDICINE

## 2020-08-28 PROCEDURE — 25000128 H RX IP 250 OP 636: Mod: ZF

## 2020-08-28 PROCEDURE — 80053 COMPREHEN METABOLIC PANEL: CPT | Performed by: INTERNAL MEDICINE

## 2020-08-28 PROCEDURE — 80061 LIPID PANEL: CPT | Performed by: INTERNAL MEDICINE

## 2020-08-28 PROCEDURE — 36415 COLL VENOUS BLD VENIPUNCTURE: CPT | Performed by: INTERNAL MEDICINE

## 2020-08-28 PROCEDURE — 90471 IMMUNIZATION ADMIN: CPT | Mod: ZF

## 2020-08-28 PROCEDURE — 82043 UR ALBUMIN QUANTITATIVE: CPT | Performed by: INTERNAL MEDICINE

## 2020-08-28 NOTE — NURSING NOTE
Chief Complaint   Patient presents with     Allied Health Visit      2nd Hep B injection   Gina Parker LPN

## 2020-11-29 ENCOUNTER — HEALTH MAINTENANCE LETTER (OUTPATIENT)
Age: 55
End: 2020-11-29

## 2020-12-13 DIAGNOSIS — E78.01 HYPERLIPIDEMIA TYPE II: ICD-10-CM

## 2020-12-16 RX ORDER — LOSARTAN POTASSIUM 100 MG/1
TABLET ORAL
Qty: 90 TABLET | Refills: 0 | Status: SHIPPED | OUTPATIENT
Start: 2020-12-16 | End: 2021-03-12

## 2020-12-16 RX ORDER — SIMVASTATIN 20 MG
TABLET ORAL
Qty: 90 TABLET | Refills: 0 | Status: SHIPPED | OUTPATIENT
Start: 2020-12-16 | End: 2021-03-12

## 2020-12-16 NOTE — TELEPHONE ENCOUNTER
Refilled 90 day supply.  Sent a My Chart message to patient regarding needing a follow up with Hina

## 2021-01-09 DIAGNOSIS — E11.9 TYPE 2 DIABETES MELLITUS WITHOUT COMPLICATION, WITHOUT LONG-TERM CURRENT USE OF INSULIN (H): ICD-10-CM

## 2021-01-14 RX ORDER — METFORMIN HCL 500 MG
TABLET, EXTENDED RELEASE 24 HR ORAL
Qty: 180 TABLET | Refills: 3 | Status: SHIPPED | OUTPATIENT
Start: 2021-01-14 | End: 2021-07-26

## 2021-01-14 NOTE — TELEPHONE ENCOUNTER
Received refill request for Metformin.  Last in clinic for annual July 2020.  Sending refill to get to next due annual summer 2021.

## 2021-03-04 DIAGNOSIS — E78.01 HYPERLIPIDEMIA TYPE II: ICD-10-CM

## 2021-03-12 RX ORDER — LOSARTAN POTASSIUM 100 MG/1
TABLET ORAL
Qty: 90 TABLET | Refills: 3 | Status: SHIPPED | OUTPATIENT
Start: 2021-03-12 | End: 2021-07-26

## 2021-03-12 RX ORDER — SIMVASTATIN 20 MG
TABLET ORAL
Qty: 90 TABLET | Refills: 3 | Status: SHIPPED | OUTPATIENT
Start: 2021-03-12 | End: 2021-07-26

## 2021-03-12 NOTE — TELEPHONE ENCOUNTER
Refill request received for losartan and simvastatin. Annual exam 07/2020 with labs 08/2020, no changes recommended. Refills sent per protocol.

## 2021-03-16 ENCOUNTER — TELEPHONE (OUTPATIENT)
Dept: GASTROENTEROLOGY | Facility: CLINIC | Age: 56
End: 2021-03-16

## 2021-03-16 NOTE — TELEPHONE ENCOUNTER
Patient is scheduled for COLONOSCOPY with Dr. HERNANDEZ    Spoke with: EVE    Date of Procedure: 04/23/2021    Location: [facility]    Sedation Type CS    Conscious Sedation- Needs  for 6 hours after the procedure  MAC/General-Needs  for 24 hours after procedure    Pre-op for Unit [address] MAC and OR NOT NEEDED    (if yes advise patient they will need a pre-op prior to procedure)      Is patient on blood thinners? -YES BABY ASPRIN (If yes- inform patient to follow up with PCP or provider for follow up instructions)     Informed patient they will need an adult  YES  Cannot take any type of public or medical transportation alone    Informed Patient of COVID Test Requirement YES    Preferred Pharmacy for Pre Prescription NA    Confirmed Nurse will call to complete assessment YES    Additional comments: NA

## 2021-04-07 DIAGNOSIS — Z11.59 ENCOUNTER FOR SCREENING FOR OTHER VIRAL DISEASES: ICD-10-CM

## 2021-04-10 ENCOUNTER — HEALTH MAINTENANCE LETTER (OUTPATIENT)
Age: 56
End: 2021-04-10

## 2021-04-12 ENCOUNTER — TELEPHONE (OUTPATIENT)
Dept: OBGYN | Facility: CLINIC | Age: 56
End: 2021-04-12

## 2021-04-12 DIAGNOSIS — I10 ESSENTIAL HYPERTENSION: ICD-10-CM

## 2021-04-12 RX ORDER — HYDROCHLOROTHIAZIDE 12.5 MG/1
12.5 TABLET ORAL DAILY
Qty: 90 TABLET | Refills: 1 | Status: SHIPPED | OUTPATIENT
Start: 2021-04-12 | End: 2021-07-26

## 2021-04-12 NOTE — TELEPHONE ENCOUNTER
Received refill request for HCTZ.  Last in clinic July 2020 for annual. Refill sent to get to next due annual.

## 2021-04-12 NOTE — TELEPHONE ENCOUNTER
----- Message from Angelina Church RN sent at 4/12/2021 10:19 AM CDT -----  Regarding: HCTZ refill  To [facility] in [place]. ASAP

## 2021-04-19 DIAGNOSIS — Z11.59 ENCOUNTER FOR SCREENING FOR OTHER VIRAL DISEASES: ICD-10-CM

## 2021-04-19 LAB
LABORATORY COMMENT REPORT: NORMAL
SARS-COV-2 RNA RESP QL NAA+PROBE: NEGATIVE
SARS-COV-2 RNA RESP QL NAA+PROBE: NORMAL
SPECIMEN SOURCE: NORMAL
SPECIMEN SOURCE: NORMAL

## 2021-04-19 PROCEDURE — U0005 INFEC AGEN DETEC AMPLI PROBE: HCPCS | Performed by: INTERNAL MEDICINE

## 2021-04-19 PROCEDURE — U0003 INFECTIOUS AGENT DETECTION BY NUCLEIC ACID (DNA OR RNA); SEVERE ACUTE RESPIRATORY SYNDROME CORONAVIRUS 2 (SARS-COV-2) (CORONAVIRUS DISEASE [COVID-19]), AMPLIFIED PROBE TECHNIQUE, MAKING USE OF HIGH THROUGHPUT TECHNOLOGIES AS DESCRIBED BY CMS-2020-01-R: HCPCS | Performed by: INTERNAL MEDICINE

## 2021-04-23 ENCOUNTER — HOSPITAL ENCOUNTER (OUTPATIENT)
Facility: AMBULATORY SURGERY CENTER | Age: 56
Discharge: HOME | End: 2021-04-23
Attending: INTERNAL MEDICINE | Admitting: INTERNAL MEDICINE
Payer: COMMERCIAL

## 2021-04-23 VITALS
SYSTOLIC BLOOD PRESSURE: 159 MMHG | OXYGEN SATURATION: 100 % | DIASTOLIC BLOOD PRESSURE: 90 MMHG | HEART RATE: 69 BPM | TEMPERATURE: 97.3 F | RESPIRATION RATE: 12 BRPM

## 2021-04-23 LAB
COLONOSCOPY: NORMAL
GLUCOSE BLDC GLUCOMTR-MCNC: 115 MG/DL (ref 70–99)

## 2021-04-23 PROCEDURE — 36415 COLL VENOUS BLD VENIPUNCTURE: CPT | Performed by: PATHOLOGY

## 2021-04-23 PROCEDURE — 82962 GLUCOSE BLOOD TEST: CPT | Performed by: PATHOLOGY

## 2021-04-23 PROCEDURE — 45380 COLONOSCOPY AND BIOPSY: CPT | Mod: 33,59

## 2021-04-23 PROCEDURE — 45385 COLONOSCOPY W/LESION REMOVAL: CPT | Mod: 33

## 2021-04-23 PROCEDURE — 88305 TISSUE EXAM BY PATHOLOGIST: CPT | Performed by: PATHOLOGY

## 2021-04-23 RX ORDER — LIDOCAINE 40 MG/G
CREAM TOPICAL
Status: DISCONTINUED | OUTPATIENT
Start: 2021-04-23 | End: 2021-04-24 | Stop reason: HOSPADM

## 2021-04-23 RX ORDER — ONDANSETRON 2 MG/ML
4 INJECTION INTRAMUSCULAR; INTRAVENOUS EVERY 6 HOURS PRN
Status: CANCELLED | OUTPATIENT
Start: 2021-04-23

## 2021-04-23 RX ORDER — ONDANSETRON 4 MG/1
4 TABLET, ORALLY DISINTEGRATING ORAL EVERY 6 HOURS PRN
Status: CANCELLED | OUTPATIENT
Start: 2021-04-23

## 2021-04-23 RX ORDER — NALOXONE HYDROCHLORIDE 0.4 MG/ML
0.4 INJECTION, SOLUTION INTRAMUSCULAR; INTRAVENOUS; SUBCUTANEOUS
Status: CANCELLED | OUTPATIENT
Start: 2021-04-23

## 2021-04-23 RX ORDER — PROCHLORPERAZINE MALEATE 10 MG
10 TABLET ORAL EVERY 6 HOURS PRN
Status: CANCELLED | OUTPATIENT
Start: 2021-04-23

## 2021-04-23 RX ORDER — NALOXONE HYDROCHLORIDE 0.4 MG/ML
0.2 INJECTION, SOLUTION INTRAMUSCULAR; INTRAVENOUS; SUBCUTANEOUS
Status: CANCELLED | OUTPATIENT
Start: 2021-04-23

## 2021-04-23 RX ORDER — FENTANYL CITRATE 50 UG/ML
INJECTION, SOLUTION INTRAMUSCULAR; INTRAVENOUS PRN
Status: DISCONTINUED | OUTPATIENT
Start: 2021-04-23 | End: 2021-04-23 | Stop reason: HOSPADM

## 2021-04-23 RX ORDER — FLUMAZENIL 0.1 MG/ML
0.2 INJECTION, SOLUTION INTRAVENOUS
Status: CANCELLED | OUTPATIENT
Start: 2021-04-23 | End: 2021-04-24

## 2021-04-23 RX ORDER — ONDANSETRON 2 MG/ML
4 INJECTION INTRAMUSCULAR; INTRAVENOUS
Status: DISCONTINUED | OUTPATIENT
Start: 2021-04-23 | End: 2021-04-24 | Stop reason: HOSPADM

## 2021-04-23 NOTE — H&P
Sola [facility]  [id number]  female  55 year old      Reason for procedure/surgery: screening, BRCA    Patient Active Problem List   Diagnosis     Malignant neoplasm of connective and other soft tissue of lower limb, including hip     Primary localized osteoarthrosis, lower leg     Obesity     Plantar fasciitis     Hyperlipidemia with target LDL less than 130     Essential hypertension, benign     Presbyopia - Both Eyes     Myopia     Adjustment disorder with mixed anxiety and depressed mood     Complicated grief     Type 2 diabetes mellitus without complication, without long-term current use of insulin (H)       Past Surgical History:    Past Surgical History:   Procedure Laterality Date     ABDOMEN SURGERY       EXCISE MASS LOWER EXTREMITY  4/11/2011    Procedure:EXCISE MASS LOWER EXTREMITY; Wound Mass; Surgeon:DREW LAURA; Location:[facility]     FOOT SURGERY  2000    left     KNEE SURGERY  1986    left     SALPINGO-OOPHORECTOMY BILATERAL         Past Medical History:   Past Medical History:   Diagnosis Date     Allergy      Ankle joint pain      BRCA2 positive      Depression      Diabetes (H)      Hyperlipidemia      Hypertension      Lumbago      Morbid obesity (H)        Social History:   Social History     Tobacco Use     Smoking status: Never Smoker     Smokeless tobacco: Never Used   Substance Use Topics     Alcohol use: Yes     Comment: rare       Family History:   Family History   Problem Relation Age of Onset     C.A.D. Other      Diabetes Other      Diabetes Other      Cancer Other         Ovary     Depression Mother      Depression Father      Nystagmus No family hx of      Anxiety Disorder No family hx of      Obesity No family hx of      Breast Cancer No family hx of      Hyperlipidemia No family hx of        Allergies:   Allergies   Allergen Reactions     Seasonal Allergies      Latex Itching, Swelling and Rash       Active Medications:   Current Outpatient Medications   Medication Sig  Dispense Refill     aspirin 81 MG tablet Take 1 tablet by mouth daily.       blood glucose (ACCU-CHEK SMARTVIEW) test strip TEST FOUR TIMES DAILY AS DIRECTED 100 strip 3     blood glucose (NO BRAND SPECIFIED) lancets standard Use to test blood sugar 2 times daily or as directed. 100 each 3     blood glucose monitoring (ACCU-CHEK COMPACT CARE KIT) meter device kit Test 4 times daily.  May dispense whatever type of brand is covered by patients insurance. 1 kit 0     Calcium-Vitamin D (CALCIUM + D PO) Take 1 tablet by mouth daily.       Cetirizine HCl (ZYRTEC PO) Take 1 tablet by mouth as needed.       estradiol (YUVAFEM) 10 MCG TABS vaginal tablet INSERT 1 TABLET VAGINALLY  TWICE WEEKLY 24 tablet 3     fluticasone (FLONASE) 50 MCG/ACT nasal spray Spray 1 spray into both nostrils daily 16 g 4     hydrochlorothiazide (HYDRODIURIL) 12.5 MG tablet Take 1 tablet (12.5 mg) by mouth daily 90 tablet 1     losartan (COZAAR) 100 MG tablet TAKE 1 TABLET BY MOUTH  DAILY 90 tablet 3     metFORMIN (GLUCOPHAGE-XR) 500 MG 24 hr tablet TAKE 2 TABLETS BY MOUTH  TWICE A DAY WITH MEALS 180 tablet 3     simvastatin (ZOCOR) 20 MG tablet TAKE 1 TABLET BY MOUTH  DAILY 90 tablet 3     VITAMIN D, CHOLECALCIFEROL, PO Take 2,000 Units by mouth daily         Systemic Review:   CONSTITUTIONAL: NEGATIVE for fever, chills, change in weight  ENT/MOUTH: NEGATIVE for ear, mouth and throat problems  RESP: NEGATIVE for significant cough or SOB  CV: NEGATIVE for chest pain, palpitations or peripheral edema    Physical Examination:   Vital Signs: BP (!) 146/97   Pulse 90   Temp 97.1  F (36.2  C) (Temporal)   Resp 18   SpO2 99%   GENERAL: healthy, alert and no distress  NECK: no adenopathy, no asymmetry, masses, or scars  RESP: lungs clear to auscultation - no rales, rhonchi or wheezes  CV: regular rate and rhythm, normal S1 S2, no S3 or S4, no murmur, click or rub, no peripheral edema and peripheral pulses strong  ABDOMEN: soft, nontender, no  hepatosplenomegaly, no masses and bowel sounds normal  MS: no gross musculoskeletal defects noted, no edema    ASA 2  MP 2    Plan: Appropriate to proceed as scheduled.      Aquiles Fonseca MD  4/23/2021    PCP:  Alda Santamaria

## 2021-04-26 LAB — COPATH REPORT: NORMAL

## 2021-04-30 ENCOUNTER — IMMUNIZATION (OUTPATIENT)
Dept: NURSING | Facility: CLINIC | Age: 56
End: 2021-04-30
Payer: COMMERCIAL

## 2021-04-30 PROCEDURE — 91300 PR COVID VAC PFIZER DIL RECON 30 MCG/0.3 ML IM: CPT

## 2021-04-30 PROCEDURE — 0001A PR COVID VAC PFIZER DIL RECON 30 MCG/0.3 ML IM: CPT

## 2021-05-21 ENCOUNTER — IMMUNIZATION (OUTPATIENT)
Dept: NURSING | Facility: CLINIC | Age: 56
End: 2021-05-21
Attending: INTERNAL MEDICINE
Payer: COMMERCIAL

## 2021-05-21 PROCEDURE — 91300 PR COVID VAC PFIZER DIL RECON 30 MCG/0.3 ML IM: CPT

## 2021-05-21 PROCEDURE — 0002A PR COVID VAC PFIZER DIL RECON 30 MCG/0.3 ML IM: CPT

## 2021-07-26 ENCOUNTER — LAB (OUTPATIENT)
Dept: LAB | Facility: CLINIC | Age: 56
End: 2021-07-26
Attending: INTERNAL MEDICINE
Payer: COMMERCIAL

## 2021-07-26 ENCOUNTER — OFFICE VISIT (OUTPATIENT)
Dept: INTERNAL MEDICINE | Facility: CLINIC | Age: 56
End: 2021-07-26
Attending: INTERNAL MEDICINE
Payer: COMMERCIAL

## 2021-07-26 VITALS
DIASTOLIC BLOOD PRESSURE: 81 MMHG | HEART RATE: 73 BPM | SYSTOLIC BLOOD PRESSURE: 118 MMHG | WEIGHT: 251 LBS | BODY MASS INDEX: 38.73 KG/M2

## 2021-07-26 DIAGNOSIS — N95.2 ATROPHIC VAGINITIS: ICD-10-CM

## 2021-07-26 DIAGNOSIS — Z15.01 BRCA POSITIVE: ICD-10-CM

## 2021-07-26 DIAGNOSIS — Z15.09 BRCA POSITIVE: ICD-10-CM

## 2021-07-26 DIAGNOSIS — I10 ESSENTIAL HYPERTENSION: ICD-10-CM

## 2021-07-26 DIAGNOSIS — E11.9 TYPE 2 DIABETES MELLITUS WITHOUT COMPLICATION, WITHOUT LONG-TERM CURRENT USE OF INSULIN (H): ICD-10-CM

## 2021-07-26 DIAGNOSIS — E78.01 HYPERLIPIDEMIA TYPE II: ICD-10-CM

## 2021-07-26 DIAGNOSIS — Z00.00 PREVENTATIVE HEALTH CARE: Primary | ICD-10-CM

## 2021-07-26 LAB
ALBUMIN SERPL-MCNC: 3.7 G/DL (ref 3.4–5)
ALP SERPL-CCNC: 76 U/L (ref 40–150)
ALT SERPL W P-5'-P-CCNC: 32 U/L (ref 0–50)
ANION GAP SERPL CALCULATED.3IONS-SCNC: 3 MMOL/L (ref 3–14)
AST SERPL W P-5'-P-CCNC: 20 U/L (ref 0–45)
BILIRUB SERPL-MCNC: 0.3 MG/DL (ref 0.2–1.3)
BUN SERPL-MCNC: 18 MG/DL (ref 7–30)
CALCIUM SERPL-MCNC: 9.4 MG/DL (ref 8.5–10.1)
CHLORIDE BLD-SCNC: 104 MMOL/L (ref 94–109)
CHOLEST SERPL-MCNC: 162 MG/DL
CO2 SERPL-SCNC: 30 MMOL/L (ref 20–32)
CREAT SERPL-MCNC: 1.03 MG/DL (ref 0.52–1.04)
FASTING STATUS PATIENT QL REPORTED: YES
GFR SERPL CREATININE-BSD FRML MDRD: 61 ML/MIN/1.73M2
GLUCOSE BLD-MCNC: 121 MG/DL (ref 70–99)
HBA1C MFR BLD: 6 % (ref 0–5.6)
HDLC SERPL-MCNC: 61 MG/DL
LDLC SERPL CALC-MCNC: 83 MG/DL
NONHDLC SERPL-MCNC: 101 MG/DL
POTASSIUM BLD-SCNC: 4.4 MMOL/L (ref 3.4–5.3)
PROT SERPL-MCNC: 7.6 G/DL (ref 6.8–8.8)
SODIUM SERPL-SCNC: 137 MMOL/L (ref 133–144)
TRIGL SERPL-MCNC: 90 MG/DL

## 2021-07-26 PROCEDURE — G0463 HOSPITAL OUTPT CLINIC VISIT: HCPCS

## 2021-07-26 PROCEDURE — 99396 PREV VISIT EST AGE 40-64: CPT | Mod: GC | Performed by: INTERNAL MEDICINE

## 2021-07-26 PROCEDURE — 83036 HEMOGLOBIN GLYCOSYLATED A1C: CPT

## 2021-07-26 PROCEDURE — 36415 COLL VENOUS BLD VENIPUNCTURE: CPT

## 2021-07-26 PROCEDURE — 82040 ASSAY OF SERUM ALBUMIN: CPT

## 2021-07-26 PROCEDURE — 82465 ASSAY BLD/SERUM CHOLESTEROL: CPT

## 2021-07-26 RX ORDER — LOSARTAN POTASSIUM 100 MG/1
100 TABLET ORAL DAILY
Qty: 90 TABLET | Refills: 3 | Status: SHIPPED | OUTPATIENT
Start: 2021-07-26 | End: 2022-07-18

## 2021-07-26 RX ORDER — SIMVASTATIN 20 MG
20 TABLET ORAL DAILY
Qty: 90 TABLET | Refills: 3 | Status: SHIPPED | OUTPATIENT
Start: 2021-07-26 | End: 2022-11-21

## 2021-07-26 RX ORDER — BLOOD SUGAR DIAGNOSTIC
STRIP MISCELLANEOUS
Qty: 100 STRIP | Refills: 3 | Status: SHIPPED | OUTPATIENT
Start: 2021-07-26 | End: 2022-11-21

## 2021-07-26 RX ORDER — METFORMIN HCL 500 MG
TABLET, EXTENDED RELEASE 24 HR ORAL
Qty: 180 TABLET | Refills: 3 | Status: SHIPPED | OUTPATIENT
Start: 2021-07-26 | End: 2022-07-18

## 2021-07-26 RX ORDER — ESTRADIOL 10 UG/1
INSERT VAGINAL
Qty: 24 TABLET | Refills: 3 | Status: SHIPPED | OUTPATIENT
Start: 2021-07-26 | End: 2022-11-21

## 2021-07-26 RX ORDER — HYDROCHLOROTHIAZIDE 12.5 MG/1
12.5 TABLET ORAL DAILY
Qty: 90 TABLET | Refills: 1 | Status: SHIPPED | OUTPATIENT
Start: 2021-07-26 | End: 2022-03-30

## 2021-07-26 ASSESSMENT — ANXIETY QUESTIONNAIRES
IF YOU CHECKED OFF ANY PROBLEMS ON THIS QUESTIONNAIRE, HOW DIFFICULT HAVE THESE PROBLEMS MADE IT FOR YOU TO DO YOUR WORK, TAKE CARE OF THINGS AT HOME, OR GET ALONG WITH OTHER PEOPLE: NOT DIFFICULT AT ALL
5. BEING SO RESTLESS THAT IT IS HARD TO SIT STILL: NOT AT ALL
7. FEELING AFRAID AS IF SOMETHING AWFUL MIGHT HAPPEN: SEVERAL DAYS
1. FEELING NERVOUS, ANXIOUS, OR ON EDGE: SEVERAL DAYS
2. NOT BEING ABLE TO STOP OR CONTROL WORRYING: NOT AT ALL
GAD7 TOTAL SCORE: 2
3. WORRYING TOO MUCH ABOUT DIFFERENT THINGS: NOT AT ALL
6. BECOMING EASILY ANNOYED OR IRRITABLE: NOT AT ALL

## 2021-07-26 ASSESSMENT — PATIENT HEALTH QUESTIONNAIRE - PHQ9
5. POOR APPETITE OR OVEREATING: NOT AT ALL
SUM OF ALL RESPONSES TO PHQ QUESTIONS 1-9: 2

## 2021-07-26 ASSESSMENT — PAIN SCALES - GENERAL: PAINLEVEL: NO PAIN (0)

## 2021-07-26 NOTE — PROGRESS NOTES
SUBJECTIVE:   CC: Sola Silverman is an 55 year old woman who presents for preventive health visit.       Patient has been advised of split billing requirements and indicates understanding: Yes  Healthy Habits:    Do you get at least three servings of calcium containing foods daily (dairy, green leafy vegetables, etc.)? Yes also taking calcium supplements in gummy vitamins    Amount of exercise or daily activities, outside of work: hasn't been going to the gym, no really exercising but moving.  Does close apple watch exercise ring    Problems taking medications regularly No    Medication side effects: No    Have you had an eye exam in the past two years? Yes (saw this past fall)    Do you see a dentist twice per year? yes    Do you have sleep apnea, excessive snoring or daytime drowsiness?no    -------------------------------------    Today's PHQ-2 Score:   PHQ-2 ( 1999 Pfizer) 7/24/2020 2/11/2020   Q1: Little interest or pleasure in doing things 0 0   Q2: Feeling down, depressed or hopeless 1 1   PHQ-2 Score 1 1   Q1: Little interest or pleasure in doing things - -   Q2: Feeling down, depressed or hopeless - -   PHQ-2 Score - -       Abuse: Current or Past(Physical, Sexual or Emotional)- No  Do you feel safe in your environment? Yes    Have you ever done Advance Care Planning? (For example, a Health Directive, POLST, or a discussion with a medical provider or your loved ones about your wishes): Yes, patient states has an Advance Care Planning document and will bring a copy to the clinic.    Social History     Tobacco Use     Smoking status: Never Smoker     Smokeless tobacco: Never Used   Substance Use Topics     Alcohol use: Yes     Comment: rare     If you drink alcohol do you typically have >3 drinks per day or >7 drinks per week? No                     Reviewed orders with patient.  Reviewed health maintenance and updated orders accordingly - Yes  Lab work is in process  Patient Active Problem List    Diagnosis     Malignant neoplasm of connective and other soft tissue of lower limb, including hip     Primary localized osteoarthrosis, lower leg     Obesity     Plantar fasciitis     Hyperlipidemia with target LDL less than 130     Essential hypertension, benign     Presbyopia - Both Eyes     Myopia     Adjustment disorder with mixed anxiety and depressed mood     Complicated grief     Type 2 diabetes mellitus without complication, without long-term current use of insulin (H)     Past Surgical History:   Procedure Laterality Date     ABDOMEN SURGERY       COLONOSCOPY N/A 4/23/2021    Procedure: COLONOSCOPY, WITH POLYPECTOMY AND BIOPSY;  Surgeon: Aquiles Fonseca MD;  Location: [facility] OR     EXCISE MASS LOWER EXTREMITY  4/11/2011    Procedure:EXCISE MASS LOWER EXTREMITY; Wound Mass; Surgeon:DREW LAURA; [facility]     FOOT SURGERY  2000    left     KNEE SURGERY  1986    left     SALPINGO-OOPHORECTOMY BILATERAL         Social History     Tobacco Use     Smoking status: Never Smoker     Smokeless tobacco: Never Used   Substance Use Topics     Alcohol use: Yes     Comment: rare     Family History   Problem Relation Age of Onset     C.A.D. Other      Diabetes Other      Diabetes Other      Cancer Other         Ovary     Depression Mother      Depression Father      Nystagmus No family hx of      Anxiety Disorder No family hx of      Obesity No family hx of      Breast Cancer No family hx of      Hyperlipidemia No family hx of          Current Outpatient Medications   Medication Sig Dispense Refill     aspirin 81 MG tablet Take 1 tablet by mouth daily.       blood glucose (ACCU-CHEK SMARTVIEW) test strip TEST FOUR TIMES DAILY AS DIRECTED 100 strip 3     Calcium-Vitamin D (CALCIUM + D PO) Take 1 tablet by mouth daily.       Cetirizine HCl (ZYRTEC PO) Take 1 tablet by mouth as needed.       estradiol (YUVAFEM) 10 MCG TABS vaginal tablet INSERT 1 TABLET VAGINALLY  TWICE WEEKLY 24 tablet 3      hydrochlorothiazide (HYDRODIURIL) 12.5 MG tablet Take 1 tablet (12.5 mg) by mouth daily 90 tablet 1     losartan (COZAAR) 100 MG tablet Take 1 tablet (100 mg) by mouth daily 90 tablet 3     metFORMIN (GLUCOPHAGE-XR) 500 MG 24 hr tablet TAKE 2 TABLETS BY MOUTH  TWICE A DAY WITH MEALS 180 tablet 3     simvastatin (ZOCOR) 20 MG tablet Take 1 tablet (20 mg) by mouth daily 90 tablet 3     VITAMIN D, CHOLECALCIFEROL, PO Take 2,000 Units by mouth daily       blood glucose (NO BRAND SPECIFIED) lancets standard Use to test blood sugar 2 times daily or as directed. 100 each 3     blood glucose monitoring (ACCU-CHEK COMPACT CARE KIT) meter device kit Test 4 times daily.  May dispense whatever type of brand is covered by patients insurance. 1 kit 0     Recent Labs   Lab Test 07/26/21  1153 08/28/20  1006 02/11/20  1210 08/09/19  1030 01/10/18  1230 10/16/17  1002   A1C 6.0* 6.1* 6.1* 7.2*   < > 6.3*   LDL 83 59  --  53   < >  --    HDL 61 60  --  58   < >  --    TRIG 90 127  --  95   < >  --    ALT 32 28  --  57*   < > 54*   CR 1.03 0.93  --  0.84  --  0.61   GFRESTIMATED 61 69  --  79  --  >90   GFRESTBLACK  --  80  --  >90  --  >90   POTASSIUM 4.4 4.4  --  4.0  --  4.1   TSH  --   --  2.74  --   --  4.00    < > = values in this interval not displayed.        FHS-7: No flowsheet data found.    Known BRCA2 with alternating MRI and mammograms   Pertinent mammograms are reviewed under the imaging tab.    Pertinent mammograms are reviewed under the imaging tab.  History of abnormal Pap smear: NO - age 30-65 PAP every 5 years with negative HPV co-testing recommended  PAP / HPV Latest Ref Rng & Units 7/24/2020 6/15/2015 1/4/2012   PAP (Historical) - NIL NIL NIL   HPV16 NEG:Negative Negative Negative -   HPV18 NEG:Negative Negative Negative -   HRHPV NEG:Negative Negative Negative -     Reviewed and updated as needed this visit by clinical staff  Tobacco  Allergies  Meds              Reviewed and updated as needed this visit by  Provider                  Past Medical History:   Diagnosis Date     Allergy      Ankle joint pain      BRCA2 positive      Depression      Diabetes (H)      Hyperlipidemia      Hypertension      Lumbago      Morbid obesity (H)       Past Surgical History:   Procedure Laterality Date     ABDOMEN SURGERY       COLONOSCOPY N/A 4/23/2021    Procedure: COLONOSCOPY, WITH POLYPECTOMY AND BIOPSY;  Surgeon: Aquiles Fonseca MD;  Location: [facility] OR     EXCISE MASS LOWER EXTREMITY  4/11/2011    Procedure:EXCISE MASS LOWER EXTREMITY; Wound Mass; Surgeon:DREW LAURA; [facility]     FOOT SURGERY  2000    left     KNEE SURGERY  1986    left     SALPINGO-OOPHORECTOMY BILATERAL       OB History   No obstetric history on file.       ROS:  CONSTITUTIONAL: NEGATIVE for fever, chills, change in weight  INTEGUMENTARY/SKIN: NEGATIVE for worrisome rashes, moles or lesions  EYES: NEGATIVE for vision changes or irritation  ENT: NEGATIVE for ear, mouth and throat problems  RESP: NEGATIVE for significant cough or SOB  BREAST: NEGATIVE for masses, tenderness or discharge  CV: NEGATIVE for chest pain, palpitations or peripheral edema  GI: NEGATIVE for nausea, abdominal pain, heartburn, or change in bowel habits  : NEGATIVE for unusual urinary or vaginal symptoms. No vaginal bleeding.  MUSCULOSKELETAL: NEGATIVE for significant arthralgias or myalgia  NEURO: NEGATIVE for weakness, dizziness or paresthesias  PSYCHIATRIC: NEGATIVE for changes in mood or affect   oophrectomy     OBJECTIVE:   /81   Pulse 73   Wt 113.9 kg (251 lb)   Breastfeeding No   BMI 38.73 kg/m    EXAM:  GENERAL: healthy, alert and no distress  EYES: Eyes grossly normal to inspection, PERRL and conjunctivae and sclerae normal  HENT: ear canals and TM's normal, nose and mouth without ulcers or lesions  NECK: no adenopathy, no asymmetry, masses, or scars and thyroid normal to palpation  RESP: lungs clear to auscultation - no rales, rhonchi or  wheezes  CV: regular rate and rhythm, normal S1 S2, no S3 or S4, no murmur, click or rub, no peripheral edema and peripheral pulses strong  ABDOMEN: soft, nontender, no hepatosplenomegaly, no masses and bowel sounds normal  MS: no gross musculoskeletal defects noted, no edema  SKIN: no suspicious lesions or rashes  NEURO: Normal strength and tone, mentation intact and speech normal  PSYCH: mentation appears normal, affect normal/bright  Diabetic foot exam: normal DP and PT pulses, no trophic changes or ulcerative lesions and normal sensory exam    Diagnostic Test Results:  Labs reviewed in Epic  No results found for this or any previous visit (from the past 24 hour(s)).    ASSESSMENT/PLAN:   1. Preventative health/ health maintainance  - Colonoscopy in April, next due in 2031  - Will get MRI scheduled (last one missed due to COVID)  - Received COVID vaccine  - Due for hep B and second zoster, ordered today but due to miscommunication left before they were given, will give at next visit     2. Type 2 diabetes mellitus without complication, without long-term current use of insulin (H)  Diabetes well controlled.  She has been getting her yearly eye exams and foot exam completed today.  She is not going to the gym due to covid but is interested in going back if safe.  She notes that she has been able to close her move and exercise rings on her apple watch just by the amount of walking she is doing.  - blood glucose (ACCU-CHEK SMARTVIEW) test strip; TEST FOUR TIMES DAILY AS DIRECTED  Dispense: 100 strip; Refill: 3  - metFORMIN (GLUCOPHAGE-XR) 500 MG 24 hr tablet; TAKE 2 TABLETS BY MOUTH  TWICE A DAY WITH MEALS  Dispense: 180 tablet; Refill: 3  - Lipid Profile; Future  - Comprehensive metabolic panel; Future  - Hemoglobin A1c; Future  - FOOT EXAM  NO CHARGE [55795.032]    3. Atrophic vaginitis  Using intermittently for symptomatic relief  - estradiol (YUVAFEM) 10 MCG TABS vaginal tablet; INSERT 1 TABLET VAGINALLY  TWICE  "WEEKLY  Dispense: 24 tablet; Refill: 3    4. Essential hypertension  Well controlled  - hydrochlorothiazide (HYDRODIURIL) 12.5 MG tablet; Take 1 tablet (12.5 mg) by mouth daily  Dispense: 90 tablet; Refill: 1    5. Hyperlipidemia type II  Will recheck lipids today.   - losartan (COZAAR) 100 MG tablet; Take 1 tablet (100 mg) by mouth daily  Dispense: 90 tablet; Refill: 3  - simvastatin (ZOCOR) 20 MG tablet; Take 1 tablet (20 mg) by mouth daily  Dispense: 90 tablet; Refill: 3  - fasting lipids     6. BRCA positive  Known BRCA 2 positive.  Has already had bilateral oophrectomy. Missed her last MRI due to COVID.   - MR Breast Bilateral w/o & w Contrast; Future    Patient has been advised of split billing requirements and indicates understanding: Yes  COUNSELING:   Reviewed preventive health counseling, as reflected in patient instructions       Regular exercise       Healthy diet/nutrition       Advance Care Planning    Estimated body mass index is 38.73 kg/m  as calculated from the following:    Height as of 7/24/20: 1.715 m (5' 7.5\").    Weight as of this encounter: 113.9 kg (251 lb).    Weight management plan: Discussed healthy diet and exercise guidelines    She reports that she has never smoked. She has never used smokeless tobacco.      Counseling Resources:  ATP IV Guidelines  Pooled Cohorts Equation Calculator  Breast Cancer Risk Calculator  BRCA-Related Cancer Risk Assessment: FHS-7 Tool  FRAX Risk Assessment  ICSI Preventive Guidelines  Dietary Guidelines for Americans, 2010  USDA's MyPlate  ASA Prophylaxis  Lung CA Screening    Alda Santamaria MD  [facility] [place]  "

## 2021-07-26 NOTE — LETTER
7/26/2021       RE: Sola Silverman  [address] [place] [address]     Dear Colleague,    Thank you for referring your patient, Sola Silverman, to the [facility] [place] [facility]. Please see a copy of my visit note below.     SUBJECTIVE:   CC: Sola Silverman is an 55 year old woman who presents for preventive health visit.       Patient has been advised of split billing requirements and indicates understanding: Yes  Healthy Habits:    Do you get at least three servings of calcium containing foods daily (dairy, green leafy vegetables, etc.)? Yes also taking calcium supplements in gummy vitamins    Amount of exercise or daily activities, outside of work: hasn't been going to the gym, no really exercising but moving.  Does close apple watch exercise ring    Problems taking medications regularly No    Medication side effects: No    Have you had an eye exam in the past two years? Yes (saw this past fall)    Do you see a dentist twice per year? yes    Do you have sleep apnea, excessive snoring or daytime drowsiness?no    -------------------------------------    Today's PHQ-2 Score:   PHQ-2 ( 1999 Pfizer) 7/24/2020 2/11/2020   Q1: Little interest or pleasure in doing things 0 0   Q2: Feeling down, depressed or hopeless 1 1   PHQ-2 Score 1 1   Q1: Little interest or pleasure in doing things - -   Q2: Feeling down, depressed or hopeless - -   PHQ-2 Score - -       Abuse: Current or Past(Physical, Sexual or Emotional)- No  Do you feel safe in your environment? Yes    Have you ever done Advance Care Planning? (For example, a Health Directive, POLST, or a discussion with a medical provider or your loved ones about your wishes): Yes, patient states has an Advance Care Planning document and will bring a copy to the clinic.    Social History     Tobacco Use     Smoking status: Never Smoker     Smokeless tobacco: Never Used   Substance Use  Topics     Alcohol use: Yes     Comment: rare     If you drink alcohol do you typically have >3 drinks per day or >7 drinks per week? No                     Reviewed orders with patient.  Reviewed health maintenance and updated orders accordingly - Yes  Lab work is in process  Patient Active Problem List   Diagnosis     Malignant neoplasm of connective and other soft tissue of lower limb, including hip     Primary localized osteoarthrosis, lower leg     Obesity     Plantar fasciitis     Hyperlipidemia with target LDL less than 130     Essential hypertension, benign     Presbyopia - Both Eyes     Myopia     Adjustment disorder with mixed anxiety and depressed mood     Complicated grief     Type 2 diabetes mellitus without complication, without long-term current use of insulin (H)     Past Surgical History:   Procedure Laterality Date     ABDOMEN SURGERY       COLONOSCOPY N/A 4/23/2021    Procedure: COLONOSCOPY, WITH POLYPECTOMY AND BIOPSY;  Surgeon: Aquiles Fonseca MD;  Location: [facility] OR     EXCISE MASS LOWER EXTREMITY  4/11/2011    Procedure:EXCISE MASS LOWER EXTREMITY; Wound Mass; Surgeon:DREW LAURA; [facility]     FOOT SURGERY  2000    left     KNEE SURGERY  1986    left     SALPINGO-OOPHORECTOMY BILATERAL         Social History     Tobacco Use     Smoking status: Never Smoker     Smokeless tobacco: Never Used   Substance Use Topics     Alcohol use: Yes     Comment: rare     Family History   Problem Relation Age of Onset     C.A.D. Other      Diabetes Other      Diabetes Other      Cancer Other         Ovary     Depression Mother      Depression Father      Nystagmus No family hx of      Anxiety Disorder No family hx of      Obesity No family hx of      Breast Cancer No family hx of      Hyperlipidemia No family hx of          Current Outpatient Medications   Medication Sig Dispense Refill     aspirin 81 MG tablet Take 1 tablet by mouth daily.       blood glucose (ACCU-CHEK SMARTVIEW) test  strip TEST FOUR TIMES DAILY AS DIRECTED 100 strip 3     Calcium-Vitamin D (CALCIUM + D PO) Take 1 tablet by mouth daily.       Cetirizine HCl (ZYRTEC PO) Take 1 tablet by mouth as needed.       estradiol (YUVAFEM) 10 MCG TABS vaginal tablet INSERT 1 TABLET VAGINALLY  TWICE WEEKLY 24 tablet 3     hydrochlorothiazide (HYDRODIURIL) 12.5 MG tablet Take 1 tablet (12.5 mg) by mouth daily 90 tablet 1     losartan (COZAAR) 100 MG tablet Take 1 tablet (100 mg) by mouth daily 90 tablet 3     metFORMIN (GLUCOPHAGE-XR) 500 MG 24 hr tablet TAKE 2 TABLETS BY MOUTH  TWICE A DAY WITH MEALS 180 tablet 3     simvastatin (ZOCOR) 20 MG tablet Take 1 tablet (20 mg) by mouth daily 90 tablet 3     VITAMIN D, CHOLECALCIFEROL, PO Take 2,000 Units by mouth daily       blood glucose (NO BRAND SPECIFIED) lancets standard Use to test blood sugar 2 times daily or as directed. 100 each 3     blood glucose monitoring (ACCU-CHEK COMPACT CARE KIT) meter device kit Test 4 times daily.  May dispense whatever type of brand is covered by patients insurance. 1 kit 0     Recent Labs   Lab Test 07/26/21  1153 08/28/20  1006 02/11/20  1210 08/09/19  1030 01/10/18  1230 10/16/17  1002   A1C 6.0* 6.1* 6.1* 7.2*   < > 6.3*   LDL 83 59  --  53   < >  --    HDL 61 60  --  58   < >  --    TRIG 90 127  --  95   < >  --    ALT 32 28  --  57*   < > 54*   CR 1.03 0.93  --  0.84  --  0.61   GFRESTIMATED 61 69  --  79  --  >90   GFRESTBLACK  --  80  --  >90  --  >90   POTASSIUM 4.4 4.4  --  4.0  --  4.1   TSH  --   --  2.74  --   --  4.00    < > = values in this interval not displayed.        FHS-7: No flowsheet data found.    Known BRCA2 with alternating MRI and mammograms   Pertinent mammograms are reviewed under the imaging tab.    Pertinent mammograms are reviewed under the imaging tab.  History of abnormal Pap smear: NO - age 30-65 PAP every 5 years with negative HPV co-testing recommended  PAP / HPV Latest Ref Rng & Units 7/24/2020 6/15/2015 1/4/2012   PAP  (Historical) - NIL NIL NIL   HPV16 NEG:Negative Negative Negative -   HPV18 NEG:Negative Negative Negative -   HRHPV NEG:Negative Negative Negative -     Reviewed and updated as needed this visit by clinical staff  Tobacco  Allergies  Meds              Reviewed and updated as needed this visit by Provider                  Past Medical History:   Diagnosis Date     Allergy      Ankle joint pain      BRCA2 positive      Depression      Diabetes (H)      Hyperlipidemia      Hypertension      Lumbago      Morbid obesity (H)       Past Surgical History:   Procedure Laterality Date     ABDOMEN SURGERY       COLONOSCOPY N/A 4/23/2021    Procedure: COLONOSCOPY, WITH POLYPECTOMY AND BIOPSY;  Surgeon: Aquiles Fonseca MD;  Location: [facility] OR     EXCISE MASS LOWER EXTREMITY  4/11/2011    Procedure:EXCISE MASS LOWER EXTREMITY; Wound Mass; Surgeon:DREW LAURA; [facility]     FOOT SURGERY  2000    left     KNEE SURGERY  1986    left     SALPINGO-OOPHORECTOMY BILATERAL       OB History   No obstetric history on file.       ROS:  CONSTITUTIONAL: NEGATIVE for fever, chills, change in weight  INTEGUMENTARY/SKIN: NEGATIVE for worrisome rashes, moles or lesions  EYES: NEGATIVE for vision changes or irritation  ENT: NEGATIVE for ear, mouth and throat problems  RESP: NEGATIVE for significant cough or SOB  BREAST: NEGATIVE for masses, tenderness or discharge  CV: NEGATIVE for chest pain, palpitations or peripheral edema  GI: NEGATIVE for nausea, abdominal pain, heartburn, or change in bowel habits  : NEGATIVE for unusual urinary or vaginal symptoms. No vaginal bleeding.  MUSCULOSKELETAL: NEGATIVE for significant arthralgias or myalgia  NEURO: NEGATIVE for weakness, dizziness or paresthesias  PSYCHIATRIC: NEGATIVE for changes in mood or affect   oophrectomy     OBJECTIVE:   /81   Pulse 73   Wt 113.9 kg (251 lb)   Breastfeeding No   BMI 38.73 kg/m    EXAM:  GENERAL: healthy, alert and no distress  EYES:  Eyes grossly normal to inspection, PERRL and conjunctivae and sclerae normal  HENT: ear canals and TM's normal, nose and mouth without ulcers or lesions  NECK: no adenopathy, no asymmetry, masses, or scars and thyroid normal to palpation  RESP: lungs clear to auscultation - no rales, rhonchi or wheezes  CV: regular rate and rhythm, normal S1 S2, no S3 or S4, no murmur, click or rub, no peripheral edema and peripheral pulses strong  ABDOMEN: soft, nontender, no hepatosplenomegaly, no masses and bowel sounds normal  MS: no gross musculoskeletal defects noted, no edema  SKIN: no suspicious lesions or rashes  NEURO: Normal strength and tone, mentation intact and speech normal  PSYCH: mentation appears normal, affect normal/bright  Diabetic foot exam: normal DP and PT pulses, no trophic changes or ulcerative lesions and normal sensory exam    Diagnostic Test Results:  Labs reviewed in Epic  No results found for this or any previous visit (from the past 24 hour(s)).    ASSESSMENT/PLAN:   1. Preventative health/ health maintainance  - Colonoscopy in April, next due in 2031  - Will get MRI scheduled (last one missed due to COVID)  - Received COVID vaccine  - Due for hep B and second zoster, ordered today but due to miscommunication left before they were given, will give at next visit     2. Type 2 diabetes mellitus without complication, without long-term current use of insulin (H)  Diabetes well controlled.  She has been getting her yearly eye exams and foot exam completed today.  She is not going to the gym due to covid but is interested in going back if safe.  She notes that she has been able to close her move and exercise rings on her apple watch just by the amount of walking she is doing.  - blood glucose (ACCU-CHEK SMARTVIEW) test strip; TEST FOUR TIMES DAILY AS DIRECTED  Dispense: 100 strip; Refill: 3  - metFORMIN (GLUCOPHAGE-XR) 500 MG 24 hr tablet; TAKE 2 TABLETS BY MOUTH  TWICE A DAY WITH MEALS  Dispense: 180  "tablet; Refill: 3  - Lipid Profile; Future  - Comprehensive metabolic panel; Future  - Hemoglobin A1c; Future  - FOOT EXAM  NO CHARGE [55722.109]    3. Atrophic vaginitis  Using intermittently for symptomatic relief  - estradiol (YUVAFEM) 10 MCG TABS vaginal tablet; INSERT 1 TABLET VAGINALLY  TWICE WEEKLY  Dispense: 24 tablet; Refill: 3    4. Essential hypertension  Well controlled  - hydrochlorothiazide (HYDRODIURIL) 12.5 MG tablet; Take 1 tablet (12.5 mg) by mouth daily  Dispense: 90 tablet; Refill: 1    5. Hyperlipidemia type II  Will recheck lipids today.   - losartan (COZAAR) 100 MG tablet; Take 1 tablet (100 mg) by mouth daily  Dispense: 90 tablet; Refill: 3  - simvastatin (ZOCOR) 20 MG tablet; Take 1 tablet (20 mg) by mouth daily  Dispense: 90 tablet; Refill: 3  - fasting lipids     6. BRCA positive  Known BRCA 2 positive.  Has already had bilateral oophrectomy. Missed her last MRI due to COVID.   - MR Breast Bilateral w/o & w Contrast; Future    Patient has been advised of split billing requirements and indicates understanding: Yes  COUNSELING:   Reviewed preventive health counseling, as reflected in patient instructions       Regular exercise       Healthy diet/nutrition       Advance Care Planning    Estimated body mass index is 38.73 kg/m  as calculated from the following:    Height as of 7/24/20: 1.715 m (5' 7.5\").    Weight as of this encounter: 113.9 kg (251 lb).    Weight management plan: Discussed healthy diet and exercise guidelines    She reports that she has never smoked. She has never used smokeless tobacco.      Counseling Resources:  ATP IV Guidelines  Pooled Cohorts Equation Calculator  Breast Cancer Risk Calculator  BRCA-Related Cancer Risk Assessment: FHS-7 Tool  FRAX Risk Assessment  ICSI Preventive Guidelines  Dietary Guidelines for Americans, 2010  USDA's MyPlate  ASA Prophylaxis  Lung CA Screening    Alda Santamaria MD  [facility]    Attestation " signed by Alda Santamaria MD at 7/27/2021 10:00 AM:  Physician Attestation   I, Alda Santamaria MD, saw this patient and agree with the findings and plan of care as documented in the note.      Items personally reviewed/procedural attestation: vitals and labs.    Alda Santamaria MD

## 2021-07-26 NOTE — PATIENT INSTRUCTIONS
Great meeting you today!     We will do your blood work today and let your know the results.  We will send refills for your medications.    Please do your breast MRI first and then your mammogram in 6 months    Please bring your Health Directive to clinic so we can scan it into your chart

## 2021-07-27 ASSESSMENT — ANXIETY QUESTIONNAIRES: GAD7 TOTAL SCORE: 2

## 2021-09-25 ENCOUNTER — HEALTH MAINTENANCE LETTER (OUTPATIENT)
Age: 56
End: 2021-09-25

## 2021-10-04 DIAGNOSIS — I10 ESSENTIAL HYPERTENSION: ICD-10-CM

## 2021-10-04 RX ORDER — HYDROCHLOROTHIAZIDE 12.5 MG/1
TABLET ORAL
Qty: 90 TABLET | Refills: 1 | Status: CANCELLED | OUTPATIENT
Start: 2021-10-04

## 2021-10-05 RX ORDER — HYDROCHLOROTHIAZIDE 12.5 MG/1
TABLET ORAL
Qty: 90 TABLET | Refills: 1 | OUTPATIENT
Start: 2021-10-05

## 2021-11-20 ENCOUNTER — HEALTH MAINTENANCE LETTER (OUTPATIENT)
Age: 56
End: 2021-11-20

## 2022-01-21 NOTE — TELEPHONE ENCOUNTER
DIAGNOSIS: (R) knee pain / self ref   APPOINTMENT DATE: 1.28.22   NOTES STATUS DETAILS   OFFICE NOTE from referring provider N/A    OFFICE NOTE from other specialist Internal PT in [facility]  9.26.19 Dr Linda Zhu, [facility]   DISCHARGE SUMMARY from hospital N/A    DISCHARGE REPORT from the ER N/A    OPERATIVE REPORT N/A    EMG report N/A    MEDICATION LIST Internal    MRI N/A    DEXA (osteoporosis/bone health) N/A    CT SCAN N/A    XRAYS (IMAGES & REPORTS) Internal 9.26.19 R knee

## 2022-01-27 DIAGNOSIS — M25.569 KNEE PAIN: Primary | ICD-10-CM

## 2022-01-27 NOTE — PROGRESS NOTES
"[facility]  Sports Medicine Clinic  Clinics and Surgery Center           SUBJECTIVE       Sola Silverman is a 56 year old female presenting to clinic today with concerns for right knee pain. Today, the patient reports that she has been seen by Dr. Zhu in the past, most recently on 9/26/19. She received a right knee steroid injection and physical therapy. She reports that she had great pain relief at least 2 years. Denies any new injuries. She is currently taking Advil for the pain prn.     Background:   Date of injury: None  Duration of symptoms: 1 month   Mechanism of Injury: No injury  Intensity: 3/10   Aggravating factors: Laying in bed, lateral movements, downstairs  Relieving Factors: Rest   Prior Evaluation: , 2019, YAKOV Zhu;  \"-CSI injection as above  -Discussed to watch BG levels for the next week, as they will increased with this injection  -Physical therapy ordered  -Knee sleeve as needed  -Continue to work out, but modify to activities as tolerated  -Follow up in about 6-8 weeks\"  Study Result    Narrative & Impression   3 views right knee radiographs 9/26/2019 1:50 PM     History: Standing bilateral AP Lat and sunrise; Chronic pain of right  knee; Chronic pain of right knee     Comparison: Left knee radiograph 1/20/2012     Findings:     Standing AP view of bilateral knees, and lateral and patellofemoral  views of the right knee were obtained.      Right:     No acute osseous abnormality. Small joint effusion.     Osteophytosis with preservation of joint space.     Lateral patellar subluxation with complete joint space loss at the  lateral aspect of patellofemoral compartment.  Soft tissue is  unremarkable.     Left: No acute osseous abnormality.     Osteophytosis with preservation of joint space. Ossicle projecting at  the intercondylar notch, similar to prior.     Bone proliferation at the medial femoral epicondyle, increased from  prior, most consistent with remote trauma.          "                                                             Impression:  1. No acute osseous abnormality.  2. Right knee lateral patellar subluxation with severe joint space  loss at the lateral aspect of the patellofemoral compartment.         PMH, Medications and Allergies were reviewed and updated as needed.    ROS:  As noted above otherwise negative.    Patient Active Problem List   Diagnosis     Malignant neoplasm of connective and other soft tissue of lower limb, including hip     Primary localized osteoarthrosis, lower leg     Obesity     Plantar fasciitis     Hyperlipidemia with target LDL less than 130     Essential hypertension, benign     Presbyopia - Both Eyes     Myopia     Adjustment disorder with mixed anxiety and depressed mood     Complicated grief     Type 2 diabetes mellitus without complication, without long-term current use of insulin (H)       Current Outpatient Medications   Medication Sig Dispense Refill     aspirin 81 MG tablet Take 1 tablet by mouth daily.       blood glucose (ACCU-CHEK SMARTVIEW) test strip TEST FOUR TIMES DAILY AS DIRECTED 100 strip 3     blood glucose (NO BRAND SPECIFIED) lancets standard Use to test blood sugar 2 times daily or as directed. 100 each 3     blood glucose monitoring (ACCU-CHEK COMPACT CARE KIT) meter device kit Test 4 times daily.  May dispense whatever type of brand is covered by patients insurance. 1 kit 0     Calcium-Vitamin D (CALCIUM + D PO) Take 1 tablet by mouth daily.       Cetirizine HCl (ZYRTEC PO) Take 1 tablet by mouth as needed.       estradiol (YUVAFEM) 10 MCG TABS vaginal tablet INSERT 1 TABLET VAGINALLY  TWICE WEEKLY 24 tablet 3     hydrochlorothiazide (HYDRODIURIL) 12.5 MG tablet Take 1 tablet (12.5 mg) by mouth daily 90 tablet 1     losartan (COZAAR) 100 MG tablet Take 1 tablet (100 mg) by mouth daily 90 tablet 3     metFORMIN (GLUCOPHAGE-XR) 500 MG 24 hr tablet TAKE 2 TABLETS BY MOUTH  TWICE A DAY WITH MEALS 180 tablet 3     simvastatin  (ZOCOR) 20 MG tablet Take 1 tablet (20 mg) by mouth daily 90 tablet 3     VITAMIN D, CHOLECALCIFEROL, PO Take 2,000 Units by mouth daily              OBJECTIVE:       Vitals:   Vitals:    01/28/22 1417   Weight: 113.9 kg (251 lb)     BMI: Body mass index is 38.73 kg/m .    Gen:  Well nourished and in no acute distress  HEENT: Extraocular movement intact  Neck: Supple  Pulm:  Breathing Comfortably. No increased respiratory effort.  Psych: Euthymic. Appropriately answers questions    MSK: Right knee without effusion.  Tenderness to palpation around the medial and lateral borders of the patella.  No joint line tenderness to palpation.  Negative Lachman and posterior drawer.  Negative Digna.      Study Result    Narrative & Impression   3 views right knee radiographs 1/28/2022 3:01 PM     History: bilateral WB AP/LAT/OBL; Knee pain      Comparison: 9/26/2019     Findings:     AP view of bilateral knees, and lateral and patellofemoral views of  the right knee were obtained.      No acute osseous abnormality.  No joint effusion.     Trace lateral patellar subluxation with significant patellofemoral  joint space loss, mildly progressed compared to prior. Degenerative  spurring medial and lateral compartments bilaterally. Mild left knee  lateral compartment joint space loss.      Soft tissue is unremarkable.                                                                      Impression:  1. No acute osseous abnormality.  2. Significant right knee patellofemoral osteoarthrosis.  3. Mild left knee lateral compartment joint space loss.               ASSESSMENT and PLAN:     Sola was seen today for consult for.    Diagnoses and all orders for this visit:    Osteoarthritis of right patellofemoral joint    56-year-old female presenting to clinic today with concerns for chronic right knee pain.  Patient has had an injection into the right knee in the past due to patellofemoral OA.  Her pain pattern is consistent with this.   She has not had any new injuries nor she having any soft tissue damage.  Her overall presentation is concerning for severe patellofemoral OA.  X-rays were reviewed in detail with the patient.    Plan: In terms of the patient's pain pattern, we have elected to proceed with invasive management for her pain today.  She will undergo a corticosteroid injection for the patellofemoral arthritis.  For long-term prognosis, we have discussed starting physical therapy again.  Patient is aware of taping of the patella which she has been doing.  She said that she does not have much time to embark on formal physical therapy but is willing to do home exercise program that she has been provided before.  I am okay with this.  Patient can follow-up in our clinic as needed.  See attached note for full injection details.      Options for treatment and/or follow-up care were reviewed with the patient was actively involved in the decision making process. Patient verbalized understanding and was in agreement with the plan.    Saul Larkin DO  , Sports Medicine  Department of Family Medicine and [facility]

## 2022-01-28 ENCOUNTER — OFFICE VISIT (OUTPATIENT)
Dept: ORTHOPEDICS | Facility: CLINIC | Age: 57
End: 2022-01-28
Payer: COMMERCIAL

## 2022-01-28 ENCOUNTER — PRE VISIT (OUTPATIENT)
Dept: ORTHOPEDICS | Facility: CLINIC | Age: 57
End: 2022-01-28

## 2022-01-28 ENCOUNTER — ANCILLARY PROCEDURE (OUTPATIENT)
Dept: GENERAL RADIOLOGY | Facility: CLINIC | Age: 57
End: 2022-01-28
Attending: STUDENT IN AN ORGANIZED HEALTH CARE EDUCATION/TRAINING PROGRAM
Payer: COMMERCIAL

## 2022-01-28 VITALS — WEIGHT: 251 LBS | BODY MASS INDEX: 38.73 KG/M2

## 2022-01-28 DIAGNOSIS — M25.569 KNEE PAIN: ICD-10-CM

## 2022-01-28 DIAGNOSIS — M17.11 OSTEOARTHRITIS OF RIGHT PATELLOFEMORAL JOINT: Primary | ICD-10-CM

## 2022-01-28 PROCEDURE — 99203 OFFICE O/P NEW LOW 30 MIN: CPT | Mod: 25 | Performed by: STUDENT IN AN ORGANIZED HEALTH CARE EDUCATION/TRAINING PROGRAM

## 2022-01-28 PROCEDURE — 73562 X-RAY EXAM OF KNEE 3: CPT | Mod: RT | Performed by: RADIOLOGY

## 2022-01-28 PROCEDURE — 20610 DRAIN/INJ JOINT/BURSA W/O US: CPT | Mod: RT | Performed by: STUDENT IN AN ORGANIZED HEALTH CARE EDUCATION/TRAINING PROGRAM

## 2022-01-28 NOTE — PROGRESS NOTES
PROCEDURE ENCOUNTER    [facility]  Saul Larkin DO  2022     Name: Sola Silverman  MRN: [id number]  Age: 56 year old  : 1965    Referring provider: self  Diagnosis: patellofemoral oa    PROCEDURE    Knee Injection - Intraarticular  The patient was informed of the risks and the benefits of the procedure and a written consent was signed.  The patient s right knee was prepped with chlorhexidine in sterile fashion.   40 mg of triamcinolone suspension was drawn up into a 5 mL syringe with 4 mL of 1% lidocaine.  Topical ethyl chloride was used as an anesthetic.  Injection was performed using substerile technique.  A 1.5-inch 22-gauge needle was used to enter the anterolateral aspect of the right knee by landmark guidance.  Injection performed successfully without difficulty.  There were no complications. The patient tolerated the procedure well. There was negligible bleeding. Band-Aid applied after.   The patient was instructed to ice the knee upon leaving clinic and refrain from overuse over the next 3-5 days.   The patient was instructed to call or go to the emergency room with any unusual pain, swelling, redness, or if otherwise concerned.  Saul Larkin DO  , Sports Medicine  Department of Family Medicine and [facility]

## 2022-01-28 NOTE — LETTER
"  1/28/2022      RE: Sola Silverman  [address] [place] [address]       [facility]  Sports Medicine Clinic  Clinics and Surgery Center           SUBJECTIVE       Sola Silverman is a 56 year old female presenting to clinic today with concerns for right knee pain. Today, the patient reports that she has been seen by Dr. Zhu in the past, most recently on 9/26/19. She received a right knee steroid injection and physical therapy. She reports that she had great pain relief at least 2 years. Denies any new injuries. She is currently taking Advil for the pain prn.     Background:   Date of injury: None  Duration of symptoms: 1 month   Mechanism of Injury: No injury  Intensity: 3/10   Aggravating factors: Laying in bed, lateral movements, downstairs  Relieving Factors: Rest   Prior Evaluation: CINTHYA, Meghan, YAKOV Zhu;  \"-CSI injection as above  -Discussed to watch BG levels for the next week, as they will increased with this injection  -Physical therapy ordered  -Knee sleeve as needed  -Continue to work out, but modify to activities as tolerated  -Follow up in about 6-8 weeks\"  Study Result    Narrative & Impression   3 views right knee radiographs 9/26/2019 1:50 PM     History: Standing bilateral AP Lat and sunrise; Chronic pain of right  knee; Chronic pain of right knee     Comparison: Left knee radiograph 1/20/2012     Findings:     Standing AP view of bilateral knees, and lateral and patellofemoral  views of the right knee were obtained.      Right:     No acute osseous abnormality. Small joint effusion.     Osteophytosis with preservation of joint space.     Lateral patellar subluxation with complete joint space loss at the  lateral aspect of patellofemoral compartment.  Soft tissue is  unremarkable.     Left: No acute osseous abnormality.     Osteophytosis with preservation of joint space. Ossicle projecting at  the intercondylar notch, similar to prior.     Bone proliferation at the medial " femoral epicondyle, increased from  prior, most consistent with remote trauma.                                                                      Impression:  1. No acute osseous abnormality.  2. Right knee lateral patellar subluxation with severe joint space  loss at the lateral aspect of the patellofemoral compartment.         PMH, Medications and Allergies were reviewed and updated as needed.    ROS:  As noted above otherwise negative.    Patient Active Problem List   Diagnosis     Malignant neoplasm of connective and other soft tissue of lower limb, including hip     Primary localized osteoarthrosis, lower leg     Obesity     Plantar fasciitis     Hyperlipidemia with target LDL less than 130     Essential hypertension, benign     Presbyopia - Both Eyes     Myopia     Adjustment disorder with mixed anxiety and depressed mood     Complicated grief     Type 2 diabetes mellitus without complication, without long-term current use of insulin (H)       Current Outpatient Medications   Medication Sig Dispense Refill     aspirin 81 MG tablet Take 1 tablet by mouth daily.       blood glucose (ACCU-CHEK SMARTVIEW) test strip TEST FOUR TIMES DAILY AS DIRECTED 100 strip 3     blood glucose (NO BRAND SPECIFIED) lancets standard Use to test blood sugar 2 times daily or as directed. 100 each 3     blood glucose monitoring (ACCU-CHEK COMPACT CARE KIT) meter device kit Test 4 times daily.  May dispense whatever type of brand is covered by patients insurance. 1 kit 0     Calcium-Vitamin D (CALCIUM + D PO) Take 1 tablet by mouth daily.       Cetirizine HCl (ZYRTEC PO) Take 1 tablet by mouth as needed.       estradiol (YUVAFEM) 10 MCG TABS vaginal tablet INSERT 1 TABLET VAGINALLY  TWICE WEEKLY 24 tablet 3     hydrochlorothiazide (HYDRODIURIL) 12.5 MG tablet Take 1 tablet (12.5 mg) by mouth daily 90 tablet 1     losartan (COZAAR) 100 MG tablet Take 1 tablet (100 mg) by mouth daily 90 tablet 3     metFORMIN (GLUCOPHAGE-XR) 500 MG 24  hr tablet TAKE 2 TABLETS BY MOUTH  TWICE A DAY WITH MEALS 180 tablet 3     simvastatin (ZOCOR) 20 MG tablet Take 1 tablet (20 mg) by mouth daily 90 tablet 3     VITAMIN D, CHOLECALCIFEROL, PO Take 2,000 Units by mouth daily              OBJECTIVE:       Vitals:   Vitals:    01/28/22 1417   Weight: 113.9 kg (251 lb)     BMI: Body mass index is 38.73 kg/m .    Gen:  Well nourished and in no acute distress  HEENT: Extraocular movement intact  Neck: Supple  Pulm:  Breathing Comfortably. No increased respiratory effort.  Psych: Euthymic. Appropriately answers questions    MSK: Right knee without effusion.  Tenderness to palpation around the medial and lateral borders of the patella.  No joint line tenderness to palpation.  Negative Lachman and posterior drawer.  Negative Digna.      Study Result    Narrative & Impression   3 views right knee radiographs 1/28/2022 3:01 PM     History: bilateral WB AP/LAT/OBL; Knee pain      Comparison: 9/26/2019     Findings:     AP view of bilateral knees, and lateral and patellofemoral views of  the right knee were obtained.      No acute osseous abnormality.  No joint effusion.     Trace lateral patellar subluxation with significant patellofemoral  joint space loss, mildly progressed compared to prior. Degenerative  spurring medial and lateral compartments bilaterally. Mild left knee  lateral compartment joint space loss.      Soft tissue is unremarkable.                                                                      Impression:  1. No acute osseous abnormality.  2. Significant right knee patellofemoral osteoarthrosis.  3. Mild left knee lateral compartment joint space loss.               ASSESSMENT and PLAN:     Sola was seen today for consult for.    Diagnoses and all orders for this visit:    Osteoarthritis of right patellofemoral joint    56-year-old female presenting to clinic today with concerns for chronic right knee pain.  Patient has had an injection into the right  knee in the past due to patellofemoral OA.  Her pain pattern is consistent with this.  She has not had any new injuries nor she having any soft tissue damage.  Her overall presentation is concerning for severe patellofemoral OA.  X-rays were reviewed in detail with the patient.    Plan: In terms of the patient's pain pattern, we have elected to proceed with invasive management for her pain today.  She will undergo a corticosteroid injection for the patellofemoral arthritis.  For long-term prognosis, we have discussed starting physical therapy again.  Patient is aware of taping of the patella which she has been doing.  She said that she does not have much time to embark on formal physical therapy but is willing to do home exercise program that she has been provided before.  I am okay with this.  Patient can follow-up in our clinic as needed.  See attached note for full injection details.      Options for treatment and/or follow-up care were reviewed with the patient was actively involved in the decision making process. Patient verbalized understanding and was in agreement with the plan.    Saul Larkin DO  , Sports Medicine  Department of Family Medicine and [facility]      PROCEDURE ENCOUNTER    [facility]  Saul Larkin DO  2022     Name: Sola Silverman  MRN: [id number]  Age: 56 year old  : 1965    Referring provider: self  Diagnosis: patellofemoral oa    PROCEDURE    Knee Injection - Intraarticular  The patient was informed of the risks and the benefits of the procedure and a written consent was signed.  The patient s right knee was prepped with chlorhexidine in sterile fashion.   40 mg of triamcinolone suspension was drawn up into a 5 mL syringe with 4 mL of 1% lidocaine.  Topical ethyl chloride was used as an anesthetic.  Injection was performed using substerile technique.  A 1.5-inch 22-gauge needle was used to enter the  anterolateral aspect of the right knee by landmark guidance.  Injection performed successfully without difficulty.  There were no complications. The patient tolerated the procedure well. There was negligible bleeding. Band-Aid applied after.   The patient was instructed to ice the knee upon leaving clinic and refrain from overuse over the next 3-5 days.   The patient was instructed to call or go to the emergency room with any unusual pain, swelling, redness, or if otherwise concerned.  Saul Larkin DO  , Sports Medicine  Department of Family Medicine and [facility]

## 2022-03-12 ENCOUNTER — HEALTH MAINTENANCE LETTER (OUTPATIENT)
Age: 57
End: 2022-03-12

## 2022-03-30 DIAGNOSIS — I10 ESSENTIAL HYPERTENSION: ICD-10-CM

## 2022-03-30 RX ORDER — HYDROCHLOROTHIAZIDE 12.5 MG/1
12.5 TABLET ORAL DAILY
Qty: 90 TABLET | Refills: 1 | Status: SHIPPED | OUTPATIENT
Start: 2022-03-30 | End: 2022-10-13

## 2022-03-30 NOTE — TELEPHONE ENCOUNTER
hydrochlorothiazide (HYDRODIURIL) 12.5 MG tablet  Last Written Prescription Date:   7/26/2021  Last Fill Quantity: 90,   # refills: 1  Last Office Visit :  7/26/2021  Future Office visit:  None  90 Tabs, 1 Refill sent to pharm 3/30/2022      Cheyenne Pepper RN  Central Triage Red Flags/Med Refills

## 2022-04-01 NOTE — LETTER
1/16/2018         Sola Silverman   [address] [place] [address]        Dear Ms. Silverman:      Your CT did not demonstrate any masses of infection process.        Results for orders placed or performed in visit on 01/15/18   CT Abdomen Pelvis w Contrast    Narrative    EXAMINATION: CT ABDOMEN PELVIS W CONTRAST, 1/15/2018 3:59 PM    TECHNIQUE: Helical CT images from the lung bases through the symphysis  pubis were obtained with IV contrast. Contrast dose: Isovue 370      135  MLS    COMPARISON: 6/17/2015, 3/11/2011, 3/6/2010    HISTORY: BRCA positive, right lower quadrant pain    FINDINGS:    Abdomen and pelvis:   Hypoattenuating hepatic parenchyma. No focal liver lesions. The  contracted gallbladder, spleen, adrenal glands, pancreas, and kidneys  appear normal. Normal appearance of the uterus. Small nabothian cysts  as better demonstrated on prior MRI of 10/15/2010. Surgically absent  ovaries.    No intra-abdominal free air or free fluid. No dilated loops of bowel.  The appendix is normal. Minimal sigmoid diverticulosis without  surrounding inflammatory changes. Normal caliber abdominal aorta. The  major abdominal vasculature is patent. No lymphadenopathy in the  abdomen or pelvis, although there are prominent subcentimeter  mesenteric lymph nodes measuring up to 9 mm in the right lower  quadrant, not significantly changed from 2015..    Lung bases:   The heart is not enlarged. No pericardial effusion. Subpleural 3 mm  solid nodule in the lateral left lower lobe (series 3, image 5),  unchanged since 2011 and statistically benign. The lung bases are  clear.    Bones and soft tissues:   Mild degenerative changes in lumbar spine most pronounced at L4-5 and  L5-S1. No acute or worrisome osseous lesions. Tiny fat-containing  umbilical hernia.        Impression    IMPRESSION:  1. Negative CT of the abdomen and pelvis for acute abnormality or  identifiable etiology for right lower quadrant pain. Normal  Ambulating well, unassisted. No distress noted.  Last seen (2-)   . Bp  ( 97/60,P-98)   taking meds: Yes.  Denied missed medications, took today Yes  Blood pressure in the office-(126/71),P-63,today.  Non smoker, watches salt & caffeine in diet.  Reviewed goals of blood pressure management.  Patient advised:Eat healthy,exercise,& drink 64 ounces of water a day.  Follow Up w/Mally Hidalgo, ( 4-4-2022), & Dr.Joyal Stafford-(8-).  Written information given to patient on High Blood Pressure and salt reduction   Call office PRN for problems  Call 911 for any life treating symptoms ( reviewed), denied at present  Verbalized understanding of above     appendix.  2. Postoperative changes of bilateral salpingo-oophorectomy.  3. Minimal sigmoid diverticulosis without evidence of diverticulitis.  4. Diffuse hepatic steatosis.    I have personally reviewed the examination and initial interpretation  and I agree with the findings.    KENTRELL WILCOX MD         Please note that test explanations are brief and do not reflect all diagnostic uses.  If you have any questions or concerns, please call the clinic at [phone].      Sincerely,      Gina Parker sent on behalf of  Alda Santamaria MD

## 2022-05-16 ENCOUNTER — OFFICE VISIT (OUTPATIENT)
Dept: ORTHOPEDICS | Facility: CLINIC | Age: 57
End: 2022-05-16
Payer: COMMERCIAL

## 2022-05-16 DIAGNOSIS — M17.11 OSTEOARTHRITIS OF RIGHT PATELLOFEMORAL JOINT: Primary | ICD-10-CM

## 2022-05-16 PROCEDURE — 20610 DRAIN/INJ JOINT/BURSA W/O US: CPT | Mod: RT | Performed by: FAMILY MEDICINE

## 2022-05-16 RX ADMIN — LIDOCAINE HYDROCHLORIDE 4 ML: 10 INJECTION, SOLUTION EPIDURAL; INFILTRATION; INTRACAUDAL; PERINEURAL at 08:54

## 2022-05-16 RX ADMIN — TRIAMCINOLONE ACETONIDE 40 MG: 40 INJECTION, SUSPENSION INTRA-ARTICULAR; INTRAMUSCULAR at 08:54

## 2022-05-16 NOTE — LETTER
5/16/2022      RE: Sola Silverman  [address] [place] [address]     Dear Colleague,    Thank you for referring your patient, Sola Silverman, to the [facility] SPORTS MEDICINE [facility]. Please see a copy of my visit note below.      [facility] AND SURGERY CENTER  SPORTS & ORTHOPEDIC CLINIC VISIT     May 16, 2022        ASSESSMENT & PLAN    56-year-old with severe right patellofemoral OA    Reviewed imaging and assessment with patient in detail  Use patellar cutout knee brace for comfort.  Brace provided today in clinic.  Submitted prior authorization for viscosupplementation injection  Steroid injection provided today.  See procedure note for details.  Discussed regular exercise and continuing home exercises.  May consider revisiting with physical therapy.      Kaiden Onofre MD  [facility] SPORTS MEDICINE [facility]    Face-to-face time:15 minutes  Record review time: 3Minutes  Documentation time: 2Minutes  Total time: 20 Minutes on day of visit independent of procedure    -----  Chief Complaint   Patient presents with     Right Knee - Follow Up       SUBJECTIVE  Sola Silverman is a/an 56 year old female who is seen for follow up of Right knee pain.     The patient is seen by themselves.    Date of injury: 2 years   Date of Last Visit: 1/28/22 By Dr. Petr Larkin    Symptoms: worsened  Worsened by: Walking, Standing, Down stairs, twisting,   Better with: taped, Sleeve, brace,   Treatment to date: corticosteroid injection (most recent date: 1/28/22) that provided  2 month(s) of relief  Associated symptoms: swelling        REVIEW OF SYSTEMS:    See HPI     OBJECTIVE:  There were no vitals taken for this visit.     Patient is alert, No acute distress, pleasant and conversational.      right knee:   Skin intact. No erythema or ecchymosis.  No effusion or soft tissue swelling.    AROM: Zero to approximately 135  with crepitus noted in the anterior knee    Palpation: No  medial or lateral facet joint tenderness.  No posterior medial or posterior lateral joint line tenderness        Full Isometric quad strength, extensor mechanism in place     Neurovascularly intact in the lower extremity    Hip and Ankle with full AROM and nontender      RADIOLOGY:    Reviewed previous x-rays of the right knee dated 1/28/2022.  Per independent review these demonstrate severe patellofemoral DJD with mild medial lateral compartment DJD.  See EMR for formal radiology report.      Large Joint Injection/Arthocentesis: R knee joint    Date/Time: 5/16/2022 8:54 AM  Performed by: Kaiden Onofre MD  Authorized by: Kaiden Onofre MD     Indications:  Pain and osteoarthritis  Needle Size:  22 G  Guidance: landmark guided    Approach:  Anterolateral  Location:  Knee      Medications:  40 mg triamcinolone 40 MG/ML; 4 mL lidocaine (PF) 1 %  Outcome:  Tolerated well, no immediate complications  Procedure discussed: discussed risks, benefits, and alternatives    Consent Given by:  Patient  Timeout: timeout called immediately prior to procedure    Prep: patient was prepped and draped in usual sterile fashion       There were no complications. The patient tolerated the procedure well. There was minimal bleeding.   The patient was instructed to ice the knee upon leaving clinic and refrain from overuse over the next 2 days.   The patient was instructed to go to the emergency room with any unusual pain, swelling, or redness occurred in the injected area.     Kaiden Onofre MD

## 2022-05-16 NOTE — PATIENT INSTRUCTIONS
Try using topical diclofenac on the knee up to 4 times daily for pain  Use brace as needed for comfort

## 2022-05-16 NOTE — NURSING NOTE
[address] [address]  Dept: [phone]  ______________________________________________________________________________    Patient: Sola Silverman   : 1965   MRN: [id number]   May 16, 2022    INVASIVE PROCEDURE SAFETY CHECKLIST    Date: May 16, 2022   Procedure:R knee CSI   Patient Name: Sola Silverman  MRN: [id number]  YOB: 1965    Action: Complete sections as appropriate. Any discrepancy results in a HARD COPY until resolved.     PRE PROCEDURE:  Patient ID verified with 2 identifiers (name and  or MRN): Yes  Procedure and site verified with patient/designee (when able): Yes  Accurate consent documentation in medical record: Yes  H&P (or appropriate assessment) documented in medical record: Yes  H&P must be up to 20 days prior to procedure and updates within 24 hours of procedure as applicable: NA  Relevant diagnostic and radiology test results appropriately labeled and displayed as applicable: Yes  Procedure site(s) marked with provider initials: NA    TIMEOUT:  Time-Out performed immediately prior to starting procedure, including verbal and active participation of all team members addressing the following:Yes  * Correct patient identify  * Confirmed that the correct side and site are marked  * An accurate procedure consent form  * Agreement on the procedure to be done  * Correct patient position  * Relevant images and results are properly labeled and appropriately displayed  * The need to administer antibiotics or fluids for irrigation purposes during the procedure as applicable   * Safety precautions based on patient history or medication use    DURING PROCEDURE: Verification of correct person, site, and procedures any time the responsibility for care of the patient is transferred to another member of the care team.       Prior to injection, verified patient identity using patient's name and date of birth.  Due to  injection administration, patient instructed to remain in clinic for 15 minutes  afterwards, and to report any adverse reaction to me immediately.    Joint injection was performed.      Drug Amount Wasted:  Yes: 1 mg/ml   Vial/Syringe: Single dose vial  Expiration Date:  1/1/25      Jodi Rasheed, ATC  May 16, 2022

## 2022-05-16 NOTE — PROGRESS NOTES
[facility] CLINICS AND SURGERY CENTER  SPORTS & ORTHOPEDIC CLINIC VISIT     May 16, 2022        ASSESSMENT & PLAN    56-year-old with severe right patellofemoral OA    Reviewed imaging and assessment with patient in detail  Use patellar cutout knee brace for comfort.  Brace provided today in clinic.  Submitted prior authorization for viscosupplementation injection  Steroid injection provided today.  See procedure note for details.  Discussed regular exercise and continuing home exercises.  May consider revisiting with physical therapy.      Kaiden Onofre MD  [facility] SPORTS MEDICINE CLINIC [place]    Face-to-face time:15 minutes  Record review time: 3Minutes  Documentation time: 2Minutes  Total time: 20 Minutes on day of visit independent of procedure    -----  Chief Complaint   Patient presents with     Right Knee - Follow Up       SUBJECTIVE  Sola Silverman is a/an 56 year old female who is seen for follow up of Right knee pain.     The patient is seen by themselves.    Date of injury: 2 years   Date of Last Visit: 1/28/22 By Dr. Petr Larkin    Symptoms: worsened  Worsened by: Walking, Standing, Down stairs, twisting,   Better with: taped, Sleeve, brace,   Treatment to date: corticosteroid injection (most recent date: 1/28/22) that provided  2 month(s) of relief  Associated symptoms: swelling        REVIEW OF SYSTEMS:    See HPI     OBJECTIVE:  There were no vitals taken for this visit.     Patient is alert, No acute distress, pleasant and conversational.      right knee:   Skin intact. No erythema or ecchymosis.  No effusion or soft tissue swelling.    AROM: Zero to approximately 135  with crepitus noted in the anterior knee    Palpation: No medial or lateral facet joint tenderness.  No posterior medial or posterior lateral joint line tenderness        Full Isometric quad strength, extensor mechanism in place     Neurovascularly intact in the lower extremity    Hip and Ankle with full AROM and  nontender      RADIOLOGY:    Reviewed previous x-rays of the right knee dated 1/28/2022.  Per independent review these demonstrate severe patellofemoral DJD with mild medial lateral compartment DJD.  See EMR for formal radiology report.      Large Joint Injection/Arthocentesis: R knee joint    Date/Time: 5/16/2022 8:54 AM  Performed by: Kaiden Onofre MD  Authorized by: Kaiden Onofre MD     Indications:  Pain and osteoarthritis  Needle Size:  22 G  Guidance: landmark guided    Approach:  Anterolateral  Location:  Knee      Medications:  40 mg triamcinolone 40 MG/ML; 4 mL lidocaine (PF) 1 %  Outcome:  Tolerated well, no immediate complications  Procedure discussed: discussed risks, benefits, and alternatives    Consent Given by:  Patient  Timeout: timeout called immediately prior to procedure    Prep: patient was prepped and draped in usual sterile fashion       There were no complications. The patient tolerated the procedure well. There was minimal bleeding.   The patient was instructed to ice the knee upon leaving clinic and refrain from overuse over the next 2 days.   The patient was instructed to go to the emergency room with any unusual pain, swelling, or redness occurred in the injected area.     Kaiden Onofre MD

## 2022-05-19 ENCOUNTER — TELEPHONE (OUTPATIENT)
Dept: ORTHOPEDICS | Facility: CLINIC | Age: 57
End: 2022-05-19

## 2022-05-19 NOTE — TELEPHONE ENCOUNTER
ATC spoke with Micah, pharmacist at [facility] regarding patient's PA for Synvisc injection. ATC explained patient will receive this injection in clinic and does not need to be processed by the pharmacy. Micah will close encounter through their system.     YESICA Leahy

## 2022-05-19 NOTE — TELEPHONE ENCOUNTER
YESICA DOYLEM for patient informing her that her insurance does not require a PA to receive a Synvisc-One injection for her right knee. She can contact [phone] to schedule her follow up appointment with Dr. Onofre for her knee injection. This can be scheduled as a 20 minute return appointment.     YESICA Leahy

## 2022-05-19 NOTE — TELEPHONE ENCOUNTER
[facility] Call Center    Phone Message    May a detailed message be left on voicemail: no     Reason for Call: Other: Wicho @ [facility] checking status of prior auth of synvisc     Action Taken: Message routed to:  [facility]    Travel Screening: Not Applicable

## 2022-05-20 RX ORDER — LIDOCAINE HYDROCHLORIDE 10 MG/ML
4 INJECTION, SOLUTION EPIDURAL; INFILTRATION; INTRACAUDAL; PERINEURAL
Status: SHIPPED | OUTPATIENT
Start: 2022-05-16

## 2022-05-20 RX ORDER — TRIAMCINOLONE ACETONIDE 40 MG/ML
40 INJECTION, SUSPENSION INTRA-ARTICULAR; INTRAMUSCULAR
Status: SHIPPED | OUTPATIENT
Start: 2022-05-16

## 2022-05-25 ENCOUNTER — OFFICE VISIT (OUTPATIENT)
Dept: URGENT CARE | Facility: URGENT CARE | Age: 57
End: 2022-05-25
Payer: COMMERCIAL

## 2022-05-25 VITALS
SYSTOLIC BLOOD PRESSURE: 137 MMHG | DIASTOLIC BLOOD PRESSURE: 83 MMHG | OXYGEN SATURATION: 99 % | HEART RATE: 80 BPM | WEIGHT: 250 LBS | BODY MASS INDEX: 38.58 KG/M2 | TEMPERATURE: 98 F

## 2022-05-25 DIAGNOSIS — Z71.1 PERSON WITH FEARED COMPLAINT IN WHOM NO DIAGNOSIS IS MADE: Primary | ICD-10-CM

## 2022-05-25 PROCEDURE — 99212 OFFICE O/P EST SF 10 MIN: CPT | Performed by: FAMILY MEDICINE

## 2022-05-25 NOTE — PROGRESS NOTES
Chief complaint: tick bite    Did a spring walk through in the woods 2 days ago       Happened: maybe 2 days ago     Patient saw something on her left inner thigh concern for a tick      Allergies   Allergen Reactions     Seasonal Allergies      Latex Itching, Swelling and Rash       Past Medical History:   Diagnosis Date     Allergy      Ankle joint pain      BRCA2 positive      Depression      Diabetes (H)      Hyperlipidemia      Hypertension      Lumbago      Morbid obesity (H)        aspirin 81 MG tablet, Take 1 tablet by mouth daily.  blood glucose (ACCU-CHEK SMARTVIEW) test strip, TEST FOUR TIMES DAILY AS DIRECTED  blood glucose (NO BRAND SPECIFIED) lancets standard, Use to test blood sugar 2 times daily or as directed.  blood glucose monitoring (ACCU-CHEK COMPACT CARE KIT) meter device kit, Test 4 times daily.  May dispense whatever type of brand is covered by patients insurance.  Calcium-Vitamin D (CALCIUM + D PO), Take 1 tablet by mouth daily.  Cetirizine HCl (ZYRTEC PO), Take 1 tablet by mouth as needed.  estradiol (YUVAFEM) 10 MCG TABS vaginal tablet, INSERT 1 TABLET VAGINALLY  TWICE WEEKLY  hydrochlorothiazide (HYDRODIURIL) 12.5 MG tablet, Take 1 tablet (12.5 mg) by mouth daily  losartan (COZAAR) 100 MG tablet, Take 1 tablet (100 mg) by mouth daily  metFORMIN (GLUCOPHAGE-XR) 500 MG 24 hr tablet, TAKE 2 TABLETS BY MOUTH  TWICE A DAY WITH MEALS  simvastatin (ZOCOR) 20 MG tablet, Take 1 tablet (20 mg) by mouth daily    lidocaine (PF) (XYLOCAINE) 1 % injection 10 mL  lidocaine (PF) (XYLOCAINE) 1 % injection 4 mL  lidocaine 1% with EPINEPHrine 1:100,000 injection 10 mL  triamcinolone (KENALOG-40) injection 40 mg        Social History     Tobacco Use     Smoking status: Never Smoker     Smokeless tobacco: Never Used   Substance Use Topics     Alcohol use: Yes     Comment: rare     Drug use: No       ROS:  review of systems negative except for noted above.       OBJECTIVE:  /83   Pulse 80   Temp 98  F  (36.7  C)   Wt 113.4 kg (250 lb)   SpO2 99%   BMI 38.58 kg/m     General:   awake, alert, and cooperative.  NAD.   Head: Normocephalic, atraumatic.  Eyes: Conjunctiva clear,  Neuro: Alert and oriented - normal speech.  MS: Using extremities freely  PSYCH:  Normal affect, normal speech  SKIN: no obvious rashes  Right inner thigh   Small 3mm scab   Examined under magnifying eye wear and light  Using sterile equipment I probed this and the scab fell off revealing light pinkish skin     ASSESSMENT:    ICD-10-CM    1. Person with feared complaint in whom no diagnosis is made  Z71.1        PLAN:   Not a tick bite however we discussed signs or symptoms of lyme's disease given recent exposure in the woods  Patient to do  A skin check in the next 30 days to watch for EM lesion.  Follow up if symptoms develop or concerns       Advised about symptoms which might herald more serious problems.        Cindy Norwood MD

## 2022-05-29 ENCOUNTER — ANCILLARY PROCEDURE (OUTPATIENT)
Dept: MRI IMAGING | Facility: CLINIC | Age: 57
End: 2022-05-29
Attending: INTERNAL MEDICINE
Payer: COMMERCIAL

## 2022-05-29 DIAGNOSIS — Z15.01 BRCA POSITIVE: ICD-10-CM

## 2022-05-29 DIAGNOSIS — Z15.09 BRCA POSITIVE: ICD-10-CM

## 2022-05-29 PROCEDURE — 77049 MRI BREAST C-+ W/CAD BI: CPT | Performed by: STUDENT IN AN ORGANIZED HEALTH CARE EDUCATION/TRAINING PROGRAM

## 2022-05-29 PROCEDURE — A9585 GADOBUTROL INJECTION: HCPCS | Performed by: STUDENT IN AN ORGANIZED HEALTH CARE EDUCATION/TRAINING PROGRAM

## 2022-05-29 RX ORDER — GADOBUTROL 604.72 MG/ML
10 INJECTION INTRAVENOUS ONCE
Status: COMPLETED | OUTPATIENT
Start: 2022-05-29 | End: 2022-05-29

## 2022-05-29 RX ADMIN — GADOBUTROL 10 ML: 604.72 INJECTION INTRAVENOUS at 10:07

## 2022-05-29 NOTE — DISCHARGE INSTRUCTIONS
MRI Contrast Discharge Instructions    The IV contrast you received today will pass out of your body in your  urine. This will happen in the next 24 hours. You will not feel this process.  Your urine will not change color.    Drink at least 4 extra glasses of water or juice today (unless your doctor  has restricted your fluids). This reduces the stress on your kidneys.  You may take your regular medicines.    If you are on dialysis: It is best to have dialysis today.    If you have a reaction: Most reactions happen right away. If you have  any new symptoms after leaving the hospital (such as hives or swelling),  call your hospital at the correct number below. Or call your family doctor.  If you have breathing distress or wheezing, call 911.    Special instructions: ***    I have read and understand the above information.    Signature:______________________________________ Date:___________    Staff:__________________________________________ Date:___________     Time:__________    [place] [facility] Departments:    ___[place]: [phone]  ___[facility]: [phone]  ___[place]: [phone] ___[facility]: [phone]  ___[facility]: [phone]  ___[facility]: [phone]  ___[phone]  ___[facility]: [phone]  ___[phone]

## 2022-07-13 DIAGNOSIS — E78.01 HYPERLIPIDEMIA TYPE II: ICD-10-CM

## 2022-07-14 DIAGNOSIS — E11.9 TYPE 2 DIABETES MELLITUS WITHOUT COMPLICATION, WITHOUT LONG-TERM CURRENT USE OF INSULIN (H): ICD-10-CM

## 2022-07-18 RX ORDER — METFORMIN HCL 500 MG
1000 TABLET, EXTENDED RELEASE 24 HR ORAL 2 TIMES DAILY WITH MEALS
Qty: 180 TABLET | Refills: 3 | Status: SHIPPED | OUTPATIENT
Start: 2022-07-18 | End: 2022-11-21

## 2022-07-18 RX ORDER — LOSARTAN POTASSIUM 100 MG/1
TABLET ORAL
Qty: 90 TABLET | Refills: 3 | Status: SHIPPED | OUTPATIENT
Start: 2022-07-18 | End: 2022-11-21

## 2022-07-18 NOTE — TELEPHONE ENCOUNTER
metFORMIN (GLUCOPHAGE-XR) 500 MG 24 hr tablet 180 tablet 3 7/26/2021           Last Written Prescription Date:  7-  Last Fill Quantity: 180,   # refills: 3  180= 45 day supply only  Last Office Visit : 7-  Future Office visit:  none    Routing refill request to provider for review/approval because:  A1c overdue  Due for annual appointment  Possible large gap in therapy or not taking with sig on medication list      Kathleen M Doege RN

## 2022-08-27 ENCOUNTER — HEALTH MAINTENANCE LETTER (OUTPATIENT)
Age: 57
End: 2022-08-27

## 2022-10-06 ENCOUNTER — TELEPHONE (OUTPATIENT)
Dept: ORTHOPEDICS | Facility: CLINIC | Age: 57
End: 2022-10-06
Payer: COMMERCIAL

## 2022-10-06 DIAGNOSIS — M17.11 OSTEOARTHRITIS OF RIGHT PATELLOFEMORAL JOINT: Primary | ICD-10-CM

## 2022-10-08 DIAGNOSIS — I10 ESSENTIAL HYPERTENSION: ICD-10-CM

## 2022-10-13 RX ORDER — HYDROCHLOROTHIAZIDE 12.5 MG/1
12.5 TABLET ORAL DAILY
Qty: 90 TABLET | Refills: 1 | Status: SHIPPED | OUTPATIENT
Start: 2022-10-13 | End: 2022-11-21

## 2022-10-13 NOTE — TELEPHONE ENCOUNTER
Last Clinic Visit: 7/26/2021 [facility]    Lab(s)- Creatinine, Sodium, Potassium due.  Millie refill of HCTZ was sent for a 90 day supply and FYI to [facility].   *future appointment 11/21/2022    Creatinine   Date Value Ref Range Status   07/26/2021 1.03 0.52 - 1.04 mg/dL Final   08/28/2020 0.93 0.52 - 1.04 mg/dL Final     Sodium   Date Value Ref Range Status   07/26/2021 137 133 - 144 mmol/L Final   08/28/2020 138 133 - 144 mmol/L Final     Potassium   Date Value Ref Range Status   07/26/2021 4.4 3.4 - 5.3 mmol/L Final   08/28/2020 4.4 3.4 - 5.3 mmol/L Final

## 2022-10-14 ENCOUNTER — OFFICE VISIT (OUTPATIENT)
Dept: ORTHOPEDICS | Facility: CLINIC | Age: 57
End: 2022-10-14
Payer: COMMERCIAL

## 2022-10-14 DIAGNOSIS — M17.11 OSTEOARTHRITIS OF RIGHT PATELLOFEMORAL JOINT: Primary | ICD-10-CM

## 2022-10-14 PROCEDURE — 20610 DRAIN/INJ JOINT/BURSA W/O US: CPT | Mod: RT | Performed by: FAMILY MEDICINE

## 2022-10-14 RX ORDER — TRIAMCINOLONE ACETONIDE 40 MG/ML
40 INJECTION, SUSPENSION INTRA-ARTICULAR; INTRAMUSCULAR
Status: SHIPPED | OUTPATIENT
Start: 2022-10-14

## 2022-10-14 RX ADMIN — TRIAMCINOLONE ACETONIDE 40 MG: 40 INJECTION, SUSPENSION INTRA-ARTICULAR; INTRAMUSCULAR at 15:37

## 2022-10-14 NOTE — NURSING NOTE
[facility]   ORTHOPEDICS & SPORTS MEDICINE  [address] [place] [address]  Dept: [phone]  ______________________________________________________________________________    Patient: Sola Silverman   : 1965   MRN: [id number]   2022    INVASIVE PROCEDURE SAFETY CHECKLIST    Date: 2022   Procedure:Right knee cortisone injection  Patient Name: Soal Silverman  MRN: [id number]  YOB: 1965    Action: Complete sections as appropriate. Any discrepancy results in a HARD COPY until resolved.     PRE PROCEDURE:  Patient ID verified with 2 identifiers (name and  or MRN): Yes  Procedure and site verified with patient/designee (when able): Yes  Accurate consent documentation in medical record: Yes  H&P (or appropriate assessment) documented in medical record: NA  H&P must be up to 20 days prior to procedure and updates within 24 hours of procedure as applicable: NA  Relevant diagnostic and radiology test results appropriately labeled and displayed as applicable: Yes  Procedure site(s) marked with provider initials: Yes    TIMEOUT:  Time-Out performed immediately prior to starting procedure, including verbal and active participation of all team members addressing the following:Yes  * Correct patient identify  * Confirmed that the correct side and site are marked  * An accurate procedure consent form  * Agreement on the procedure to be done  * Correct patient position  * Relevant images and results are properly labeled and appropriately displayed  * The need to administer antibiotics or fluids for irrigation purposes during the procedure as applicable   * Safety precautions based on patient history or medication use    DURING PROCEDURE: Verification of correct person, site, and procedures any time the responsibility for care of the patient is transferred to another member of the care team.       Prior to injection, verified patient identity using  patient's name and date of birth.  Due to injection administration, patient instructed to remain in clinic for 15 minutes  afterwards, and to report any adverse reaction to me immediately.    Joint injection was performed.      Drug Amount Wasted:  None.  Vial/Syringe: Single dose vial  Expiration Date:  7/30/2024      Anya Archer, [facility]  October 14, 2022

## 2022-10-14 NOTE — PROGRESS NOTES
[facility]  SPORTS MEDICINE CLINIC VISIT     Oct 14, 2022          Large Joint Injection/Arthocentesis: R knee joint    Date/Time: 10/14/2022 3:37 PM  Performed by: Kaiden Onofre MD  Authorized by: Kaiden Onofre MD     Indications:  Pain  Needle Size:  22 G  Guidance: landmark guided    Approach:  Anterolateral  Location:  Knee      Medications:  40 mg triamcinolone 40 MG/ML  Outcome:  Tolerated well, no immediate complications  Procedure discussed: discussed risks, benefits, and alternatives    Consent Given by:  Patient  Timeout: timeout called immediately prior to procedure    Prep: patient was prepped and draped in usual sterile fashion       There were no complications. The patient tolerated the procedure well. There was minimal bleeding.   The patient was instructed to ice the knee upon leaving clinic and refrain from overuse over the next 2 days.   The patient was instructed to go to the emergency room with any unusual pain, swelling, or redness occurred in the injected area.     Kaiden Onofre MD

## 2022-10-14 NOTE — LETTER
10/14/2022         RE: Sola Silverman  [address] [place] [address]        Dear Colleague,    Thank you for referring your patient, Sola Silverman, to the [facility] SPORTS MEDICINE CLINIC [place]. Please see a copy of my visit note below.      [facility]  SPORTS MEDICINE CLINIC VISIT     Oct 14, 2022          Large Joint Injection/Arthocentesis: R knee joint    Date/Time: 10/14/2022 3:37 PM  Performed by: Kaiden Onofre MD  Authorized by: Kaiden Onofre MD     Indications:  Pain  Needle Size:  22 G  Guidance: landmark guided    Approach:  Anterolateral  Location:  Knee      Medications:  40 mg triamcinolone 40 MG/ML  Outcome:  Tolerated well, no immediate complications  Procedure discussed: discussed risks, benefits, and alternatives    Consent Given by:  Patient  Timeout: timeout called immediately prior to procedure    Prep: patient was prepped and draped in usual sterile fashion       There were no complications. The patient tolerated the procedure well. There was minimal bleeding.   The patient was instructed to ice the knee upon leaving clinic and refrain from overuse over the next 2 days.   The patient was instructed to go to the emergency room with any unusual pain, swelling, or redness occurred in the injected area.     Kaiden Onofre MD              Again, thank you for allowing me to participate in the care of your patient.        Sincerely,        Kaiden Onofre MD

## 2022-11-21 ENCOUNTER — OFFICE VISIT (OUTPATIENT)
Dept: INTERNAL MEDICINE | Facility: CLINIC | Age: 57
End: 2022-11-21
Attending: INTERNAL MEDICINE
Payer: COMMERCIAL

## 2022-11-21 ENCOUNTER — LAB (OUTPATIENT)
Dept: LAB | Facility: CLINIC | Age: 57
End: 2022-11-21
Attending: INTERNAL MEDICINE
Payer: COMMERCIAL

## 2022-11-21 VITALS
SYSTOLIC BLOOD PRESSURE: 118 MMHG | WEIGHT: 253 LBS | HEIGHT: 68 IN | HEART RATE: 76 BPM | DIASTOLIC BLOOD PRESSURE: 84 MMHG | BODY MASS INDEX: 38.34 KG/M2

## 2022-11-21 DIAGNOSIS — Z12.31 ENCOUNTER FOR SCREENING MAMMOGRAM FOR BREAST CANCER: Primary | ICD-10-CM

## 2022-11-21 DIAGNOSIS — E11.9 TYPE 2 DIABETES MELLITUS WITHOUT COMPLICATION, WITHOUT LONG-TERM CURRENT USE OF INSULIN (H): ICD-10-CM

## 2022-11-21 DIAGNOSIS — E66.01 MORBID OBESITY (H): ICD-10-CM

## 2022-11-21 DIAGNOSIS — N95.2 ATROPHIC VAGINITIS: ICD-10-CM

## 2022-11-21 DIAGNOSIS — I10 ESSENTIAL HYPERTENSION: ICD-10-CM

## 2022-11-21 DIAGNOSIS — L98.9 SKIN LESION: ICD-10-CM

## 2022-11-21 DIAGNOSIS — E78.01 HYPERLIPIDEMIA TYPE II: ICD-10-CM

## 2022-11-21 DIAGNOSIS — N90.89 VULVAR FISSURE: ICD-10-CM

## 2022-11-21 LAB
ANION GAP SERPL CALCULATED.3IONS-SCNC: 6 MMOL/L (ref 3–14)
BUN SERPL-MCNC: 15 MG/DL (ref 7–30)
CALCIUM SERPL-MCNC: 9.9 MG/DL (ref 8.5–10.1)
CHLORIDE BLD-SCNC: 100 MMOL/L (ref 94–109)
CHOLEST SERPL-MCNC: 169 MG/DL
CO2 SERPL-SCNC: 31 MMOL/L (ref 20–32)
CREAT SERPL-MCNC: 0.88 MG/DL (ref 0.52–1.04)
CREAT UR-MCNC: 80 MG/DL
FASTING STATUS PATIENT QL REPORTED: NO
GFR SERPL CREATININE-BSD FRML MDRD: 76 ML/MIN/1.73M2
GLUCOSE BLD-MCNC: 121 MG/DL (ref 70–99)
HBA1C MFR BLD: 6.6 % (ref 0–5.6)
HDLC SERPL-MCNC: 58 MG/DL
LDLC SERPL CALC-MCNC: 89 MG/DL
MICROALBUMIN UR-MCNC: 7 MG/L
MICROALBUMIN/CREAT UR: 8.75 MG/G CR (ref 0–25)
NONHDLC SERPL-MCNC: 111 MG/DL
POTASSIUM BLD-SCNC: 4.4 MMOL/L (ref 3.4–5.3)
SODIUM SERPL-SCNC: 137 MMOL/L (ref 133–144)
TRIGL SERPL-MCNC: 108 MG/DL

## 2022-11-21 PROCEDURE — G0463 HOSPITAL OUTPT CLINIC VISIT: HCPCS | Performed by: INTERNAL MEDICINE

## 2022-11-21 PROCEDURE — 36415 COLL VENOUS BLD VENIPUNCTURE: CPT

## 2022-11-21 PROCEDURE — 99396 PREV VISIT EST AGE 40-64: CPT | Performed by: INTERNAL MEDICINE

## 2022-11-21 PROCEDURE — 80061 LIPID PANEL: CPT

## 2022-11-21 PROCEDURE — 83036 HEMOGLOBIN GLYCOSYLATED A1C: CPT

## 2022-11-21 PROCEDURE — 80048 BASIC METABOLIC PNL TOTAL CA: CPT

## 2022-11-21 PROCEDURE — 82043 UR ALBUMIN QUANTITATIVE: CPT

## 2022-11-21 RX ORDER — CLOBETASOL PROPIONATE 0.5 MG/G
OINTMENT TOPICAL 2 TIMES DAILY
Qty: 60 G | Refills: 0 | Status: SHIPPED | OUTPATIENT
Start: 2022-11-21 | End: 2023-01-02

## 2022-11-21 RX ORDER — HYDROCHLOROTHIAZIDE 12.5 MG/1
12.5 TABLET ORAL DAILY
Qty: 90 TABLET | Refills: 1 | Status: SHIPPED | OUTPATIENT
Start: 2022-11-21 | End: 2023-03-27

## 2022-11-21 RX ORDER — LOSARTAN POTASSIUM 100 MG/1
100 TABLET ORAL DAILY
Qty: 90 TABLET | Refills: 3 | Status: SHIPPED | OUTPATIENT
Start: 2022-11-21 | End: 2023-03-27

## 2022-11-21 RX ORDER — ESTRADIOL 10 UG/1
INSERT VAGINAL
Qty: 24 TABLET | Refills: 3 | Status: SHIPPED | OUTPATIENT
Start: 2022-11-21 | End: 2023-03-27

## 2022-11-21 RX ORDER — SIMVASTATIN 20 MG
20 TABLET ORAL DAILY
Qty: 90 TABLET | Refills: 3 | Status: SHIPPED | OUTPATIENT
Start: 2022-11-21 | End: 2023-03-27

## 2022-11-21 RX ORDER — METFORMIN HCL 500 MG
1000 TABLET, EXTENDED RELEASE 24 HR ORAL 2 TIMES DAILY WITH MEALS
Qty: 180 TABLET | Refills: 3 | Status: SHIPPED | OUTPATIENT
Start: 2022-11-21 | End: 2023-03-27

## 2022-11-21 RX ORDER — BLOOD SUGAR DIAGNOSTIC
STRIP MISCELLANEOUS
Qty: 100 STRIP | Refills: 3 | Status: SHIPPED | OUTPATIENT
Start: 2022-11-21 | End: 2023-03-27

## 2022-11-21 ASSESSMENT — ANXIETY QUESTIONNAIRES
7. FEELING AFRAID AS IF SOMETHING AWFUL MIGHT HAPPEN: MORE THAN HALF THE DAYS
2. NOT BEING ABLE TO STOP OR CONTROL WORRYING: MORE THAN HALF THE DAYS
GAD7 TOTAL SCORE: 6
6. BECOMING EASILY ANNOYED OR IRRITABLE: NOT AT ALL
GAD7 TOTAL SCORE: 6
1. FEELING NERVOUS, ANXIOUS, OR ON EDGE: SEVERAL DAYS
5. BEING SO RESTLESS THAT IT IS HARD TO SIT STILL: NOT AT ALL
3. WORRYING TOO MUCH ABOUT DIFFERENT THINGS: SEVERAL DAYS

## 2022-11-21 ASSESSMENT — PATIENT HEALTH QUESTIONNAIRE - PHQ9
5. POOR APPETITE OR OVEREATING: NOT AT ALL
SUM OF ALL RESPONSES TO PHQ QUESTIONS 1-9: 7

## 2022-11-21 NOTE — LETTER
Date:November 22, 2022      Patient was self referred, no letter generated. Do not send.        [facility] Health Information

## 2022-11-21 NOTE — PROGRESS NOTES
clobetaso SUBJECTIVE:   CC: Sola Silverman is an 57 year old woman who presents for preventive health visit.       Patient has been advised of split billing requirements and indicates understanding: Yes  Healthy Habits:    Do you get at least three servings of calcium containing foods daily (dairy, green leafy vegetables, etc.)? yes    Amount of exercise or daily activities, outside of work: not regular    Problems taking medications regularly No    Medication side effects: No    Have you had an eye exam in the past two years? yes    Do you see a dentist twice per year? yes    Do you have sleep apnea, excessive snoring or daytime drowsiness?no      -------------------------------------    Today's PHQ-2 Score:   PHQ-2 ( 1999 [employer]) 11/21/2022 5/16/2022   Q1: Little interest or pleasure in doing things 1 0   Q2: Feeling down, depressed or hopeless 0 0   PHQ-2 Score 1 0   PHQ-2 Total Score (12-17 Years)- Positive if 3 or more points; Administer PHQ-A if positive - -   Q1: Little interest or pleasure in doing things - -   Q2: Feeling down, depressed or hopeless - -   PHQ-2 Score - -       Abuse: Current or Past(Physical, Sexual or Emotional)- No  Do you feel safe in your environment? Yes        Social History     Tobacco Use     Smoking status: Never     Smokeless tobacco: Never   Substance Use Topics     Alcohol use: Yes     Comment: rare     If you drink alcohol do you typically have >3 drinks per day or >7 drinks per week? No                     Reviewed orders with patient.  Reviewed health maintenance and updated orders accordingly - Yes  Labs reviewed in EPIC    FHS-7: No flowsheet data found.    Mammogram Screening - Alternate mammogram schedule due to breast cancer history  Pertinent mammograms are reviewed under the imaging tab.    Pertinent mammograms are reviewed under the imaging tab.  History of abnormal Pap smear: NO - age 30-65 PAP every 5 years with negative HPV co-testing recommended  PAP / HPV  "Latest Ref Rng & Units 7/24/2020 6/15/2015 1/4/2012   PAP (Historical) - NIL NIL NIL   HPV16 NEG:Negative Negative Negative -   HPV18 NEG:Negative Negative Negative -   HRHPV NEG:Negative Negative Negative -     Reviewed and updated as needed this visit by clinical staff   Tobacco  Allergies  Meds              Reviewed and updated as needed this visit by Provider                 Past Medical History:   Diagnosis Date     Allergy      Ankle joint pain      BRCA2 positive      Depression      Diabetes (H)      Hyperlipidemia      Hypertension      Lumbago      Morbid obesity (H)       Past Surgical History:   Procedure Laterality Date     ABDOMEN SURGERY       COLONOSCOPY N/A 4/23/2021    Procedure: COLONOSCOPY, WITH POLYPECTOMY AND BIOPSY;  Surgeon: Aquiles Fonseca MD;  Location: [facility] OR     EXCISE MASS LOWER EXTREMITY  4/11/2011    Procedure:EXCISE MASS LOWER EXTREMITY; Wound Mass; Surgeon:DREW LAURA; Location:[facility]     FOOT SURGERY  2000    left     KNEE SURGERY  1986    left     SALPINGO-OOPHORECTOMY BILATERAL         ROS:  CONSTITUTIONAL: NEGATIVE for fever, chills, change in weight  INTEGUMENTARY/SKIN: NEGATIVE for worrisome rashes, moles or lesions  EYES: NEGATIVE for vision changes or irritation  ENT: NEGATIVE for ear, mouth and throat problems  RESP: NEGATIVE for significant cough or SOB  BREAST: NEGATIVE for masses, tenderness or discharge  CV: NEGATIVE for chest pain, palpitations or peripheral edema  GI: NEGATIVE for nausea, abdominal pain, heartburn, or change in bowel habits   menopausal female: skin irritation - vulvar area  MUSCULOSKELETAL: NEGATIVE for significant arthralgias or myalgia  NEURO: NEGATIVE for weakness, dizziness or paresthesias  PSYCHIATRIC: NEGATIVE for changes in mood or affect     OBJECTIVE:   /84   Pulse 76   Ht 1.715 m (5' 7.5\")   Wt 114.8 kg (253 lb)   BMI 39.04 kg/m    EXAM:  GENERAL: healthy, alert and no distress  EYES: Eyes grossly normal to " "inspection, PERRL and conjunctivae and sclerae normal  NECK: no adenopathy, no asymmetry, masses, or scars and thyroid normal to palpation  RESP: lungs clear to auscultation - no rales, rhonchi or wheezes  CV: regular rate and rhythm, normal S1 S2, no S3 or S4, no murmur, click or rub, no peripheral edema and peripheral pulses strong  ABDOMEN: soft, nontender and bowel sounds normal   (female): small patch of skin lichenification, midline fissure, significant atrophy  MS: no gross musculoskeletal defects noted, no edema  SKIN: no suspicious lesions or rashes    Diagnostic Test Results:  Labs reviewed in Epic    ASSESSMENT/PLAN:       ICD-10-CM    1. Encounter for screening mammogram for breast cancer  Z12.31 MA Screen Bilateral w/Bryant      2. Essential hypertension  I10 hydrochlorothiazide (HYDRODIURIL) 12.5 MG tablet      3. Hyperlipidemia type II  E78.01 losartan (COZAAR) 100 MG tablet     simvastatin (ZOCOR) 20 MG tablet      4. Type 2 diabetes mellitus without complication, without long-term current use of insulin (H)  E11.9 metFORMIN (GLUCOPHAGE XR) 500 MG 24 hr tablet     blood glucose (ACCU-CHEK SMARTVIEW) test strip     blood glucose (NO BRAND SPECIFIED) lancets standard     Hemoglobin A1c     Albumin Random Urine Quantitative with Creat Ratio     Basic Metabolic Panel     Lipid Profile      5. Atrophic vaginitis  N95.2 estradiol (YUVAFEM) 10 MCG TABS vaginal tablet      6. Morbid obesity (H)  E66.01       7. Skin lesion  L98.9 Adult Dermatology Referral      8. Vulvar fissure  N90.89 clobetasol (TEMOVATE) 0.05 % external ointment          Patient has been advised of split billing requirements and indicates understanding: Yes  COUNSELING:   Reviewed preventive health counseling, as reflected in patient instructions       Regular exercise       Healthy diet/nutrition    Estimated body mass index is 39.04 kg/m  as calculated from the following:    Height as of this encounter: 1.715 m (5' 7.5\").    Weight as " of this encounter: 114.8 kg (253 lb).        She reports that she has never smoked. She has never used smokeless tobacco.      Counseling Resources:  ATP IV Guidelines  Pooled Cohorts Equation Calculator  Breast Cancer Risk Calculator  BRCA-Related Cancer Risk Assessment: FHS-7 Tool  FRAX Risk Assessment  ICSI Preventive Guidelines  Dietary Guidelines for Americans, 2010  USDA's MyPlate  ASA Prophylaxis  Lung CA Screening    Alda Santamaria MD  [facility] [place]

## 2022-11-21 NOTE — LETTER
11/21/2022       RE: Sola Silverman  [address] [place] [address]     Dear Colleague,    Thank you for referring your patient, Sola Silverman, to the [facility] [place] at [facility]. Please see a copy of my visit note below.    Chief Complaint   Patient presents with     Physical     Annual exam   Gina Parker LPN    clobetaso SUBJECTIVE:   CC: Sola Silverman is an 57 year old woman who presents for preventive health visit.       Patient has been advised of split billing requirements and indicates understanding: Yes  Healthy Habits:    Do you get at least three servings of calcium containing foods daily (dairy, green leafy vegetables, etc.)? yes    Amount of exercise or daily activities, outside of work: not regular    Problems taking medications regularly No    Medication side effects: No    Have you had an eye exam in the past two years? yes    Do you see a dentist twice per year? yes    Do you have sleep apnea, excessive snoring or daytime drowsiness?no      -------------------------------------    Today's PHQ-2 Score:   PHQ-2 ( 1999 Pfizer) 11/21/2022 5/16/2022   Q1: Little interest or pleasure in doing things 1 0   Q2: Feeling down, depressed or hopeless 0 0   PHQ-2 Score 1 0   PHQ-2 Total Score (12-17 Years)- Positive if 3 or more points; Administer PHQ-A if positive - -   Q1: Little interest or pleasure in doing things - -   Q2: Feeling down, depressed or hopeless - -   PHQ-2 Score - -       Abuse: Current or Past(Physical, Sexual or Emotional)- No  Do you feel safe in your environment? Yes        Social History     Tobacco Use     Smoking status: Never     Smokeless tobacco: Never   Substance Use Topics     Alcohol use: Yes     Comment: rare     If you drink alcohol do you typically have >3 drinks per day or >7 drinks per week? No                     Reviewed orders with patient.  Reviewed health maintenance and  updated orders accordingly - Yes  Labs reviewed in EPIC    FHS-7: No flowsheet data found.    Mammogram Screening - Alternate mammogram schedule due to breast cancer history  Pertinent mammograms are reviewed under the imaging tab.    Pertinent mammograms are reviewed under the imaging tab.  History of abnormal Pap smear: NO - age 30-65 PAP every 5 years with negative HPV co-testing recommended  PAP / HPV Latest Ref Rng & Units 7/24/2020 6/15/2015 1/4/2012   PAP (Historical) - NIL NIL NIL   HPV16 NEG:Negative Negative Negative -   HPV18 NEG:Negative Negative Negative -   HRHPV NEG:Negative Negative Negative -     Reviewed and updated as needed this visit by clinical staff   Tobacco  Allergies  Meds              Reviewed and updated as needed this visit by Provider                 Past Medical History:   Diagnosis Date     Allergy      Ankle joint pain      BRCA2 positive      Depression      Diabetes (H)      Hyperlipidemia      Hypertension      Lumbago      Morbid obesity (H)       Past Surgical History:   Procedure Laterality Date     ABDOMEN SURGERY       COLONOSCOPY N/A 4/23/2021    Procedure: COLONOSCOPY, WITH POLYPECTOMY AND BIOPSY;  Surgeon: Aquiles Fonseca MD;  Location: [facility] OR     EXCISE MASS LOWER EXTREMITY  4/11/2011    Procedure:EXCISE MASS LOWER EXTREMITY; Wound Mass; Surgeon:DREW LAURA; [facility]     FOOT SURGERY  2000    left     KNEE SURGERY  1986    left     SALPINGO-OOPHORECTOMY BILATERAL         ROS:  CONSTITUTIONAL: NEGATIVE for fever, chills, change in weight  INTEGUMENTARY/SKIN: NEGATIVE for worrisome rashes, moles or lesions  EYES: NEGATIVE for vision changes or irritation  ENT: NEGATIVE for ear, mouth and throat problems  RESP: NEGATIVE for significant cough or SOB  BREAST: NEGATIVE for masses, tenderness or discharge  CV: NEGATIVE for chest pain, palpitations or peripheral edema  GI: NEGATIVE for nausea, abdominal pain, heartburn, or change in bowel habits    "menopausal female: skin irritation - vulvar area  MUSCULOSKELETAL: NEGATIVE for significant arthralgias or myalgia  NEURO: NEGATIVE for weakness, dizziness or paresthesias  PSYCHIATRIC: NEGATIVE for changes in mood or affect     OBJECTIVE:   /84   Pulse 76   Ht 1.715 m (5' 7.5\")   Wt 114.8 kg (253 lb)   BMI 39.04 kg/m    EXAM:  GENERAL: healthy, alert and no distress  EYES: Eyes grossly normal to inspection, PERRL and conjunctivae and sclerae normal  NECK: no adenopathy, no asymmetry, masses, or scars and thyroid normal to palpation  RESP: lungs clear to auscultation - no rales, rhonchi or wheezes  CV: regular rate and rhythm, normal S1 S2, no S3 or S4, no murmur, click or rub, no peripheral edema and peripheral pulses strong  ABDOMEN: soft, nontender and bowel sounds normal   (female): small patch of skin lichenification, midline fissure, significant atrophy  MS: no gross musculoskeletal defects noted, no edema  SKIN: no suspicious lesions or rashes    Diagnostic Test Results:  Labs reviewed in Epic    ASSESSMENT/PLAN:       ICD-10-CM    1. Encounter for screening mammogram for breast cancer  Z12.31 MA Screen Bilateral w/Bryant      2. Essential hypertension  I10 hydrochlorothiazide (HYDRODIURIL) 12.5 MG tablet      3. Hyperlipidemia type II  E78.01 losartan (COZAAR) 100 MG tablet     simvastatin (ZOCOR) 20 MG tablet      4. Type 2 diabetes mellitus without complication, without long-term current use of insulin (H)  E11.9 metFORMIN (GLUCOPHAGE XR) 500 MG 24 hr tablet     blood glucose (ACCU-CHEK SMARTVIEW) test strip     blood glucose (NO BRAND SPECIFIED) lancets standard     Hemoglobin A1c     Albumin Random Urine Quantitative with Creat Ratio     Basic Metabolic Panel     Lipid Profile      5. Atrophic vaginitis  N95.2 estradiol (YUVAFEM) 10 MCG TABS vaginal tablet      6. Morbid obesity (H)  E66.01       7. Skin lesion  L98.9 Adult Dermatology Referral      8. Vulvar fissure  N90.89 clobetasol " "(TEMOVATE) 0.05 % external ointment          Patient has been advised of split billing requirements and indicates understanding: Yes  COUNSELING:   Reviewed preventive health counseling, as reflected in patient instructions       Regular exercise       Healthy diet/nutrition    Estimated body mass index is 39.04 kg/m  as calculated from the following:    Height as of this encounter: 1.715 m (5' 7.5\").    Weight as of this encounter: 114.8 kg (253 lb).        She reports that she has never smoked. She has never used smokeless tobacco.      Counseling Resources:  ATP IV Guidelines  Pooled Cohorts Equation Calculator  Breast Cancer Risk Calculator  BRCA-Related Cancer Risk Assessment: FHS-7 Tool  FRAX Risk Assessment  ICSI Preventive Guidelines  Dietary Guidelines for Americans, 2010  USDA's MyPlate  ASA Prophylaxis  Lung CA Screening    Alda Santamaria MD  [facility] [place]        Again, thank you for allowing me to participate in the care of your patient.      Sincerely,    Alda Santamaria MD      "

## 2022-11-22 ENCOUNTER — ANCILLARY PROCEDURE (OUTPATIENT)
Dept: MAMMOGRAPHY | Facility: CLINIC | Age: 57
End: 2022-11-22
Attending: INTERNAL MEDICINE
Payer: COMMERCIAL

## 2022-11-22 ENCOUNTER — OFFICE VISIT (OUTPATIENT)
Dept: DERMATOLOGY | Facility: CLINIC | Age: 57
End: 2022-11-22
Attending: INTERNAL MEDICINE
Payer: COMMERCIAL

## 2022-11-22 DIAGNOSIS — B07.9 VERRUCA VULGARIS: ICD-10-CM

## 2022-11-22 DIAGNOSIS — L57.8 ACTINIC SKIN DAMAGE: Primary | ICD-10-CM

## 2022-11-22 DIAGNOSIS — Z12.31 ENCOUNTER FOR SCREENING MAMMOGRAM FOR BREAST CANCER: ICD-10-CM

## 2022-11-22 DIAGNOSIS — L98.9 SKIN LESION: ICD-10-CM

## 2022-11-22 DIAGNOSIS — L82.1 SEBORRHEIC KERATOSES: ICD-10-CM

## 2022-11-22 PROCEDURE — 17110 DESTRUCTION B9 LES UP TO 14: CPT | Mod: GC | Performed by: STUDENT IN AN ORGANIZED HEALTH CARE EDUCATION/TRAINING PROGRAM

## 2022-11-22 PROCEDURE — 77063 BREAST TOMOSYNTHESIS BI: CPT | Mod: GC | Performed by: RADIOLOGY

## 2022-11-22 PROCEDURE — 77067 SCR MAMMO BI INCL CAD: CPT | Mod: GC | Performed by: RADIOLOGY

## 2022-11-22 PROCEDURE — 99203 OFFICE O/P NEW LOW 30 MIN: CPT | Mod: 25 | Performed by: STUDENT IN AN ORGANIZED HEALTH CARE EDUCATION/TRAINING PROGRAM

## 2022-11-22 ASSESSMENT — PAIN SCALES - GENERAL: PAINLEVEL: NO PAIN (0)

## 2022-11-22 NOTE — LETTER
11/22/2022       RE: Sola Silverman  [address] [place] [address]     Dear Colleague,    Thank you for referring your patient, Sola Silverman, to the [facility] [place] at [facility]. Please see a copy of my visit note below.    I have personally examined this patient and agree with the resident doctor's documentation and plan of care. I have reviewed and amended the resident's note above. The documentation accurately reflects my clinical observations, diagnoses, treatment and follow-up plans. I was present for key portions of the procedure(s).       Rob Martinez MD  Dermatology Staff        [facility] Dermatology Note  Encounter Date: Nov 22, 2022  Office Visit     Dermatology Problem List:  1. Seborrheic keratoses, small R/L cheek  2. Verruca vulgaris, L palm - s/p cryotherapy 11/22/2022    ____________________________________________    Assessment & Plan:     # Verruca vulgaris, L palm   - Curettage and tx'd with cryotherapy, see procedure note below   - Can use compound W over the counter    # Seborrheic keratoses, small R/L cheek   - Reassurance provided   - Treated with cryotherapy, see procedure note below    #actinic skin damage  - discussed sunprotective behaviors, clothing, and the use of sunscreen    Procedures Performed:   - Cryotherapy procedure note, location(s): L palm. After verbal consent and discussion of risks and benefits including, but not limited to, dyspigmentation/scar, blister, and pain, 1 wart on palm lesion(s) was(were) treated with 1-2 mm freeze border for 1-2 cycles with liquid nitrogen. Post cryotherapy instructions were provided.      Follow-up: prn for new or changing lesions    Staff and Resident:     Seen and discussed with Dr. Michelle Pennington  PGY-2 [facility] Internal Medicine  [phone]  ____________________________________________    CC: Derm Problem (Skin lesion  on left side of face.)    HPI:  Ms. Sola Silverman is a(n) 57 year old female who presents today as a new patient for evaluation of a skin lesion on the L side of her face. The patient reports a raised spot on the left side of her cheek, present x 6 months. She states that it has changed slightly in shape, has a dimple in the center which is concerning to her. The color has also darkened and it looked dry and scalier when makeup was applied over the area. Was also concerned by the irregular shape. Does not itch, hurt, or bleed. Also notes 2 smaller raised lesions to her L nasolabial fold and one to her right lower cheek that have been present.    Pt has increased veins around an area on her R shin after she fell a couple years ago. Not painful, just unsightly. She wonders if anything can/should be done about it.    Also notes a 'wart' to her L palm - she has tried many OTC treatments but states that it always returns.    Patient is otherwise feeling well, without additional skin concerns.    Labs Reviewed:  N/A    Physical Exam:  Vitals: There were no vitals taken for this visit.  SKIN: Focused examination of face, RLE, and  was performed  - There is a verrucous papule with thrombosed capillaries interrupting dermatoglyphics on the left central palm.   - RLE: telangiectasis present on the anterior shin  - There is a waxy stuck on tan to brown papule on the R and L cheek b/l, no erythema or crusted skin.   - No other lesions of concern on areas examined.     Medications:  Current Outpatient Medications   Medication     aspirin 81 MG tablet     blood glucose (ACCU-CHEK SMARTVIEW) test strip     blood glucose (NO BRAND SPECIFIED) lancets standard     blood glucose monitoring (ACCU-CHEK COMPACT CARE KIT) meter device kit     Calcium-Vitamin D (CALCIUM + D PO)     Cetirizine HCl (ZYRTEC PO)     clobetasol (TEMOVATE) 0.05 % external ointment     estradiol (YUVAFEM) 10 MCG TABS vaginal tablet     hydrochlorothiazide  (HYDRODIURIL) 12.5 MG tablet     losartan (COZAAR) 100 MG tablet     metFORMIN (GLUCOPHAGE XR) 500 MG 24 hr tablet     simvastatin (ZOCOR) 20 MG tablet     Current Facility-Administered Medications   Medication     lidocaine (PF) (XYLOCAINE) 1 % injection 4 mL     triamcinolone (KENALOG-40) injection 40 mg     triamcinolone (KENALOG-40) injection 40 mg     Facility-Administered Medications Ordered in Other Visits   Medication     lidocaine (PF) (XYLOCAINE) 1 % injection 10 mL     lidocaine 1% with EPINEPHrine 1:100,000 injection 10 mL      Past Medical History:   Patient Active Problem List   Diagnosis     Malignant neoplasm of connective and other soft tissue of lower limb, including hip     Primary localized osteoarthrosis, lower leg     Obesity     Plantar fasciitis     Hyperlipidemia with target LDL less than 130     Essential hypertension, benign     Presbyopia - Both Eyes     Myopia     Adjustment disorder with mixed anxiety and depressed mood     Complicated grief     Type 2 diabetes mellitus without complication, without long-term current use of insulin (H)     Morbid obesity (H)     Past Medical History:   Diagnosis Date     Allergy      Ankle joint pain      BRCA2 positive      Depression      Diabetes (H)      Hyperlipidemia      Hypertension      Lumbago      Morbid obesity (H)        CC Alda Santamaria MD  [address] [place] [address] on close of this encounter.

## 2022-11-22 NOTE — PROGRESS NOTES
I have personally examined this patient and agree with the resident doctor's documentation and plan of care. I have reviewed and amended the resident's note above. The documentation accurately reflects my clinical observations, diagnoses, treatment and follow-up plans. I was present for key portions of the procedure(s).       Rob Martinez MD  Dermatology Staff        [facility] Dermatology Note  Encounter Date: Nov 22, 2022  Office Visit     Dermatology Problem List:  1. Seborrheic keratoses, small R/L cheek  2. Verruca vulgaris, L palm - s/p cryotherapy 11/22/2022    ____________________________________________    Assessment & Plan:     # Verruca vulgaris, L palm   - Curettage and tx'd with cryotherapy, see procedure note below   - Can use compound W over the counter    # Seborrheic keratoses, small R/L cheek   - Reassurance provided   - Treated with cryotherapy, see procedure note below    #actinic skin damage  - discussed sunprotective behaviors, clothing, and the use of sunscreen    Procedures Performed:   - Cryotherapy procedure note, location(s): L palm wart and SK r and L cheek. After verbal consent and discussion of risks and benefits including, but not limited to, dyspigmentation/scar, blister, and pain, 1 wart on palm and 2 sks on face lesion(s) was(were) treated with 1-2 mm freeze border for 1-2 cycles with liquid nitrogen. Post cryotherapy instructions were provided.      Follow-up: prn for new or changing lesions    Staff and Resident:     Seen and discussed with Dr. Michelle Pennington  PGY-2 [facility] Internal Medicine  [phone]  ____________________________________________    CC: Derm Problem (Skin lesion on left side of face.)    HPI:  Ms. Sola Silverman is a(n) 57 year old female who presents today as a new patient for evaluation of a skin lesion on the L side of her face. The patient reports a raised spot on the left side of her cheek, present x 6 months. She states that  it has changed slightly in shape, has a dimple in the center which is concerning to her. The color has also darkened and it looked dry and scalier when makeup was applied over the area. Was also concerned by the irregular shape. Does not itch, hurt, or bleed. Also notes 2 smaller raised lesions to her L nasolabial fold and one to her right lower cheek that have been present.    Pt has increased veins around an area on her R shin after she fell a couple years ago. Not painful, just unsightly. She wonders if anything can/should be done about it.    Also notes a 'wart' to her L palm - she has tried many OTC treatments but states that it always returns.    Patient is otherwise feeling well, without additional skin concerns.    Labs Reviewed:  N/A    Physical Exam:  Vitals: There were no vitals taken for this visit.  SKIN: Focused examination of face, RLE, and  was performed  - There is a verrucous papule with thrombosed capillaries interrupting dermatoglyphics on the left central palm.   - RLE: telangiectasis present on the anterior shin  - There is a waxy stuck on tan to brown papule on the R and L cheek b/l, no erythema or crusted skin.   - No other lesions of concern on areas examined.     Medications:  Current Outpatient Medications   Medication     aspirin 81 MG tablet     blood glucose (ACCU-CHEK SMARTVIEW) test strip     blood glucose (NO BRAND SPECIFIED) lancets standard     blood glucose monitoring (ACCU-CHEK COMPACT CARE KIT) meter device kit     Calcium-Vitamin D (CALCIUM + D PO)     Cetirizine HCl (ZYRTEC PO)     clobetasol (TEMOVATE) 0.05 % external ointment     estradiol (YUVAFEM) 10 MCG TABS vaginal tablet     hydrochlorothiazide (HYDRODIURIL) 12.5 MG tablet     losartan (COZAAR) 100 MG tablet     metFORMIN (GLUCOPHAGE XR) 500 MG 24 hr tablet     simvastatin (ZOCOR) 20 MG tablet     Current Facility-Administered Medications   Medication     lidocaine (PF) (XYLOCAINE) 1 % injection 4 mL     triamcinolone  (KENALOG-40) injection 40 mg     triamcinolone (KENALOG-40) injection 40 mg     Facility-Administered Medications Ordered in Other Visits   Medication     lidocaine (PF) (XYLOCAINE) 1 % injection 10 mL     lidocaine 1% with EPINEPHrine 1:100,000 injection 10 mL      Past Medical History:   Patient Active Problem List   Diagnosis     Malignant neoplasm of connective and other soft tissue of lower limb, including hip     Primary localized osteoarthrosis, lower leg     Obesity     Plantar fasciitis     Hyperlipidemia with target LDL less than 130     Essential hypertension, benign     Presbyopia - Both Eyes     Myopia     Adjustment disorder with mixed anxiety and depressed mood     Complicated grief     Type 2 diabetes mellitus without complication, without long-term current use of insulin (H)     Morbid obesity (H)     Past Medical History:   Diagnosis Date     Allergy      Ankle joint pain      BRCA2 positive      Depression      Diabetes (H)      Hyperlipidemia      Hypertension      Lumbago      Morbid obesity (H)        CC Alda Santamaria MD  [address] [place] [address] on close of this encounter.

## 2022-11-22 NOTE — PATIENT INSTRUCTIONS
We treated a wart on your L palm with cryotherapy (see below). You can continue to use Compound W on the wart as well.    The skin lesion on your face is a noncancerous lesion called a seborrheic keratosis.    Cryotherapy Instructions    For the areas treated with liquid nitrogen (cryotherapy or freezing) today, they are expected to get pink, puffy, and perhaps even blister. The area should then crust up and fall off and the goal is to have nice new skin underneath. There is nothing special that you need to do for these areas. You can wash them as you do normal skin.     Sometimes a blister develops; if a blister does develop do NOT pop it. However, if it breaks open on its own, be sure to wash it with soap and water daily and put plain vasaline or petroleum jelly and a bandaid on it until the skin is healed.     Please call the clinic if you have any questions or concerns.

## 2022-12-13 ENCOUNTER — NURSE TRIAGE (OUTPATIENT)
Dept: NURSING | Facility: CLINIC | Age: 57
End: 2022-12-13

## 2022-12-13 ENCOUNTER — LAB (OUTPATIENT)
Dept: LAB | Facility: CLINIC | Age: 57
End: 2022-12-13
Payer: COMMERCIAL

## 2022-12-13 ENCOUNTER — OFFICE VISIT (OUTPATIENT)
Dept: INTERNAL MEDICINE | Facility: CLINIC | Age: 57
End: 2022-12-13
Payer: COMMERCIAL

## 2022-12-13 VITALS
OXYGEN SATURATION: 97 % | SYSTOLIC BLOOD PRESSURE: 130 MMHG | WEIGHT: 249.4 LBS | TEMPERATURE: 97.6 F | HEART RATE: 100 BPM | HEIGHT: 68 IN | DIASTOLIC BLOOD PRESSURE: 78 MMHG | BODY MASS INDEX: 37.8 KG/M2

## 2022-12-13 DIAGNOSIS — R19.7 DIARRHEA, UNSPECIFIED TYPE: ICD-10-CM

## 2022-12-13 DIAGNOSIS — R10.31 RLQ ABDOMINAL PAIN: Primary | ICD-10-CM

## 2022-12-13 DIAGNOSIS — R10.31 RLQ ABDOMINAL PAIN: ICD-10-CM

## 2022-12-13 LAB
ALBUMIN SERPL BCG-MCNC: 4.5 G/DL (ref 3.5–5.2)
ALP SERPL-CCNC: 83 U/L (ref 35–104)
ALT SERPL W P-5'-P-CCNC: 41 U/L (ref 10–35)
ANION GAP SERPL CALCULATED.3IONS-SCNC: 14 MMOL/L (ref 7–15)
AST SERPL W P-5'-P-CCNC: 26 U/L (ref 10–35)
BILIRUB SERPL-MCNC: 0.3 MG/DL
BUN SERPL-MCNC: 17.8 MG/DL (ref 6–20)
CALCIUM SERPL-MCNC: 9.9 MG/DL (ref 8.6–10)
CHLORIDE SERPL-SCNC: 98 MMOL/L (ref 98–107)
CREAT SERPL-MCNC: 1.05 MG/DL (ref 0.51–0.95)
CRP SERPL-MCNC: 20.8 MG/L
DEPRECATED HCO3 PLAS-SCNC: 27 MMOL/L (ref 22–29)
ERYTHROCYTE [DISTWIDTH] IN BLOOD BY AUTOMATED COUNT: 13.3 % (ref 10–15)
ERYTHROCYTE [SEDIMENTATION RATE] IN BLOOD BY WESTERGREN METHOD: 23 MM/HR (ref 0–30)
GFR SERPL CREATININE-BSD FRML MDRD: 62 ML/MIN/1.73M2
GLUCOSE SERPL-MCNC: 136 MG/DL (ref 70–99)
HCT VFR BLD AUTO: 39.5 % (ref 35–47)
HGB BLD-MCNC: 13.1 G/DL (ref 11.7–15.7)
LIPASE SERPL-CCNC: 27 U/L (ref 13–60)
MCH RBC QN AUTO: 28.5 PG (ref 26.5–33)
MCHC RBC AUTO-ENTMCNC: 33.2 G/DL (ref 31.5–36.5)
MCV RBC AUTO: 86 FL (ref 78–100)
PLATELET # BLD AUTO: 341 10E3/UL (ref 150–450)
POTASSIUM SERPL-SCNC: 4.4 MMOL/L (ref 3.4–5.3)
PROT SERPL-MCNC: 7.6 G/DL (ref 6.4–8.3)
RBC # BLD AUTO: 4.59 10E6/UL (ref 3.8–5.2)
SODIUM SERPL-SCNC: 139 MMOL/L (ref 136–145)
WBC # BLD AUTO: 13.8 10E3/UL (ref 4–11)

## 2022-12-13 PROCEDURE — 85027 COMPLETE CBC AUTOMATED: CPT | Performed by: PATHOLOGY

## 2022-12-13 PROCEDURE — 99214 OFFICE O/P EST MOD 30 MIN: CPT | Performed by: INTERNAL MEDICINE

## 2022-12-13 PROCEDURE — 36415 COLL VENOUS BLD VENIPUNCTURE: CPT | Performed by: PATHOLOGY

## 2022-12-13 PROCEDURE — 83690 ASSAY OF LIPASE: CPT | Performed by: PATHOLOGY

## 2022-12-13 PROCEDURE — 80053 COMPREHEN METABOLIC PANEL: CPT | Performed by: PATHOLOGY

## 2022-12-13 PROCEDURE — 86140 C-REACTIVE PROTEIN: CPT | Performed by: PATHOLOGY

## 2022-12-13 PROCEDURE — 85652 RBC SED RATE AUTOMATED: CPT | Performed by: PATHOLOGY

## 2022-12-13 NOTE — TELEPHONE ENCOUNTER
Spoke with patient, scheduled an appointment today 12/13/22 @ 3:00 with Dr. Yumiko Gerard, RN on 12/13/2022 at 2:04 PM

## 2022-12-13 NOTE — PROGRESS NOTES
Assessment & Plan     Right lateral abdominal pain  and  Diarrhea, unspecified type    Sola presents with R lateral abdominal pain, nausea, and diarrhea for 5-6 days.  Symptoms are starting to improved the past couple of days and seem most likely due to viral gastroenteritis.  She is very worried given her family history of pancreatic cancer, but this presentation would not be typical, which she was reassured by.  Also seems unrelated to her prior umbilical hernia as pain is lateral to the umbilicus.  Pain is fairly mild with negative psoas sign, so appendicitis seems less likely.  Pain is on the right rather than the left as would be typical for diverticulitis.  She'd like to check labs and imaging for reassurance.  We'll get labs as below today, but if labs are normal, I think it would be fine to observe for a few days before proceeding with CT since symptoms are improving.  Continue light diet and hydrating.  Offered anti-nausea med, but she feels she is managing this okay.      - CBC with platelets; Future  - Comprehensive metabolic panel; Future  - Lipase; Future  - C. difficile Toxin B PCR with reflex to C. difficile Antigen and Toxins A/B EIA; Future  - Stool Enteric Bacteria and Virus Panel; Future  - Erythrocyte sedimentation rate; Future  - CRP inflammation; Future  - CT Abdomen/Pelvis w contrast; Future        Mickey Calderon MD  [facility] INTERNAL MEDICINE [facility]   Sola is a 57 year old, presenting for the following health issues:  RECHECK (Right sided pain, nausea, diarrhea)      HPI     Acute Illness  Acute illness concerns: diarrhea and pain just to the right of the umbilicus  Onset/Duration: last Thursday or Friday  Symptoms:  Fever: No  Chills/Sweats: No  Headache: on Sunday from laying in bed too long  Decreased Appetite: YES- improving since last night  Nausea: YES- but improving  Vomiting: Nearly, but did not  Diarrhea: profuse yellow watery diarrhea for a  "few days (every hour or more for awhile in the morning), improved and more formed the past couple of days (2-3 BMs this morning).  No blood in the stool or black stool  Dysuria/Freq.: No  Dysuria or Hematuria: No  Fatigue/Achiness: YES- some fatigue on Saturday and Sunday  Sick/Strep Exposure: No  Therapies tried and outcome: None (not able to tolerate diarrhea meds as they cause constipation).  Keeping up with fluid intake    She is concerned because she is BRCA2 positive and there is family history of pancreatic cancer.  She has a history of umbilical hernia- she is not able to feel this so isn't aware if it has gotten larger.  She had chicken wings last week and is concerned that she could have swallowed a fragment of bone.      Review of Systems   Constitutional, gi and gu systems are negative, except as otherwise noted.      Objective    /78 (BP Location: Right arm, Patient Position: Sitting, Cuff Size: Adult Large)   Pulse 100   Temp 97.6  F (36.4  C) (Oral)   Ht 1.715 m (5' 7.52\")   Wt 113.1 kg (249 lb 6.4 oz)   SpO2 97%   BMI 38.46 kg/m    Body mass index is 38.46 kg/m .  Physical Exam     GENERAL: healthy, alert and no distress  RESP: lungs clear to auscultation - no rales, rhonchi or wheezes  CV: regular rate and rhythm, normal S1 S2, no S3 or S4, no murmur, click or rub  ABDOMEN: soft, mild right lateral abdominal tenderness, no rebound or guarding, and bowel sounds normal, negative psoas sign               "

## 2022-12-13 NOTE — NURSING NOTE
Sola Silverman is a 57 year old female patient that presents today in clinic for the following:    Chief Complaint   Patient presents with     RECHECK     Right sided pain, nausea, diarrhea     The patient's allergies and medications were reviewed as noted. A set of vitals were recorded as noted without incident. The patient does not have any other questions for the provider.    Adam Walker, EMT at 2:57 PM on 12/13/2022

## 2022-12-13 NOTE — TELEPHONE ENCOUNTER
Nurse Triage SBAR    Is this a 2nd Level Triage? Yes    Situation:   Upper right side abdominal pain for the last week.       Background/Assessment:     Pt reporting since about last Thursday or Friday having right side abdominal pain right above the midline of the belly button.    Having profuse yellow water diarrhea, nausea and feeling like she is going to throw up.   No appetite.     But no fever or chills noted.        Today the stools is a little more formed but still really yellow.  The right side abdominal pain is still there.    But really hurts when she pushes on her right side.  It lets her know it is still there.    Pt worried about an infection, umbilical hernia, or pancreatic cancer.     Pt would like an office visit, lab work and an MRI of the abdominal area.      Please call the  Pt back @ [phone] for further assistance for Pt care.      Thank you     Cheyenne Pepper RN  Central Triage Red Flags/Med Refills    Protocol Recommended Disposition:   See in Office Today      Reason for Disposition    Patient wants to be seen    Additional Information    Negative: Passed out (i.e., fainted, collapsed and was not responding)    Negative: Shock suspected (e.g., cold/pale/clammy skin, too weak to stand, low BP, rapid pulse)    Negative: Visible sweat on face or sweat is dripping down    Negative: Chest pain    Negative: SEVERE abdominal pain (e.g., excruciating)    Negative: Pain lasting > 10 minutes and over 50 years old    Negative: Pain lasting > 10 minutes and over 40 years old and associated chest, arm, neck, upper back, or jaw pain    Negative: Pain lasting > 10 minutes and over 35 years old and at least one cardiac risk factor (i.e., hypertension, diabetes, obesity, smoker or strong family history of heart disease)    Negative: Pain lasting > 10 minutes and history of heart disease (i.e., heart attack, bypass surgery, angina, angioplasty, CHF)    Negative: Recent injury to the abdomen    Negative:  Vomiting red blood or black (coffee ground) material    Negative: Bloody, black, or tarry bowel movements  (Exception: Chronic-unchanged black-grey bowel movements and is taking iron pills or Pepto-Bismol.)    Negative: Pregnant 20 weeks or more and new hand or face swelling    Negative: Constant abdominal pain lasting > 2 hours    Negative: Vomiting bile (green color)    Negative: Patient sounds very sick or weak to the triager    Negative: Vomiting and abdomen looks much more swollen than usual    Negative: White of the eyes have turned yellow (i.e., jaundice)    Negative: Fever > 103 F (39.4 C)    Negative: Fever > 101 F (38.3 C) and over 60 years of age    Negative: Fever > 100.0 F (37.8 C) and has diabetes mellitus or a weak immune system (e.g., HIV positive, cancer chemotherapy, organ transplant, splenectomy, chronic steroids)    Negative: Fever > 100.0 F (37.8 C) and bedridden (e.g., nursing home patient, stroke, chronic illness, recovering from surgery)    Negative: Age > 60 years    Protocols used: ABDOMINAL PAIN - UPPER-A-OH

## 2022-12-14 ENCOUNTER — ANCILLARY PROCEDURE (OUTPATIENT)
Dept: CT IMAGING | Facility: CLINIC | Age: 57
End: 2022-12-14
Attending: INTERNAL MEDICINE
Payer: COMMERCIAL

## 2022-12-14 DIAGNOSIS — R10.31 RLQ ABDOMINAL PAIN: ICD-10-CM

## 2022-12-14 PROBLEM — K76.0 FATTY LIVER: Status: ACTIVE | Noted: 2022-12-14

## 2022-12-14 LAB — C DIFF TOX B STL QL: NEGATIVE

## 2022-12-14 PROCEDURE — 87493 C DIFF AMPLIFIED PROBE: CPT | Performed by: PATHOLOGY

## 2022-12-14 PROCEDURE — 74177 CT ABD & PELVIS W/CONTRAST: CPT | Mod: GC | Performed by: RADIOLOGY

## 2022-12-14 RX ORDER — IOPAMIDOL 755 MG/ML
125 INJECTION, SOLUTION INTRAVASCULAR ONCE
Status: COMPLETED | OUTPATIENT
Start: 2022-12-14 | End: 2022-12-14

## 2022-12-14 RX ADMIN — IOPAMIDOL 125 ML: 755 INJECTION, SOLUTION INTRAVASCULAR at 11:27

## 2022-12-15 LAB
C COLI+JEJUNI+LARI FUSA STL QL NAA+PROBE: ABNORMAL
EC STX1 GENE STL QL NAA+PROBE: ABNORMAL
EC STX2 GENE STL QL NAA+PROBE: ABNORMAL
NOROV GI+II ORF1-ORF2 JNC STL QL NAA+PR: ABNORMAL
RVA NSP5 STL QL NAA+PROBE: ABNORMAL
SALMONELLA SP RPOD STL QL NAA+PROBE: ABNORMAL
SHIGELLA SP+EIEC IPAH STL QL NAA+PROBE: ABNORMAL
V CHOL+PARA RFBL+TRKH+TNAA STL QL NAA+PR: ABNORMAL
Y ENTERO RECN STL QL NAA+PROBE: ABNORMAL

## 2022-12-22 ENCOUNTER — PATIENT OUTREACH (OUTPATIENT)
Dept: GASTROENTEROLOGY | Facility: CLINIC | Age: 57
End: 2022-12-22

## 2023-01-02 ENCOUNTER — LAB (OUTPATIENT)
Dept: LAB | Facility: CLINIC | Age: 58
End: 2023-01-02
Attending: OBSTETRICS & GYNECOLOGY
Payer: COMMERCIAL

## 2023-01-02 ENCOUNTER — OFFICE VISIT (OUTPATIENT)
Dept: OBGYN | Facility: CLINIC | Age: 58
End: 2023-01-02
Attending: OBSTETRICS & GYNECOLOGY
Payer: COMMERCIAL

## 2023-01-02 VITALS
DIASTOLIC BLOOD PRESSURE: 86 MMHG | BODY MASS INDEX: 37.72 KG/M2 | HEIGHT: 68 IN | WEIGHT: 248.85 LBS | HEART RATE: 79 BPM | SYSTOLIC BLOOD PRESSURE: 139 MMHG

## 2023-01-02 DIAGNOSIS — R10.31 RLQ ABDOMINAL PAIN: ICD-10-CM

## 2023-01-02 DIAGNOSIS — N90.89 VULVAR FISSURE: ICD-10-CM

## 2023-01-02 DIAGNOSIS — L90.0 LICHEN SCLEROSUS: Primary | ICD-10-CM

## 2023-01-02 LAB
ALBUMIN SERPL-MCNC: 3.8 G/DL (ref 3.4–5)
ALP SERPL-CCNC: 81 U/L (ref 40–150)
ALT SERPL W P-5'-P-CCNC: 39 U/L (ref 0–50)
ANION GAP SERPL CALCULATED.3IONS-SCNC: 6 MMOL/L (ref 3–14)
AST SERPL W P-5'-P-CCNC: 19 U/L (ref 0–45)
BILIRUB SERPL-MCNC: 0.4 MG/DL (ref 0.2–1.3)
BUN SERPL-MCNC: 15 MG/DL (ref 7–30)
CALCIUM SERPL-MCNC: 9.7 MG/DL (ref 8.5–10.1)
CHLORIDE BLD-SCNC: 99 MMOL/L (ref 94–109)
CO2 SERPL-SCNC: 31 MMOL/L (ref 20–32)
CREAT SERPL-MCNC: 0.86 MG/DL (ref 0.52–1.04)
ERYTHROCYTE [DISTWIDTH] IN BLOOD BY AUTOMATED COUNT: 13.2 % (ref 10–15)
GFR SERPL CREATININE-BSD FRML MDRD: 78 ML/MIN/1.73M2
GLUCOSE BLD-MCNC: 150 MG/DL (ref 70–99)
HCT VFR BLD AUTO: 40 % (ref 35–47)
HGB BLD-MCNC: 13.3 G/DL (ref 11.7–15.7)
MCH RBC QN AUTO: 28.9 PG (ref 26.5–33)
MCHC RBC AUTO-ENTMCNC: 33.3 G/DL (ref 31.5–36.5)
MCV RBC AUTO: 87 FL (ref 78–100)
PLATELET # BLD AUTO: 347 10E3/UL (ref 150–450)
POTASSIUM BLD-SCNC: 4.3 MMOL/L (ref 3.4–5.3)
PROT SERPL-MCNC: 7.8 G/DL (ref 6.8–8.8)
RBC # BLD AUTO: 4.61 10E6/UL (ref 3.8–5.2)
SODIUM SERPL-SCNC: 136 MMOL/L (ref 133–144)
WBC # BLD AUTO: 11.4 10E3/UL (ref 4–11)

## 2023-01-02 PROCEDURE — 85027 COMPLETE CBC AUTOMATED: CPT

## 2023-01-02 PROCEDURE — 99212 OFFICE O/P EST SF 10 MIN: CPT | Performed by: OBSTETRICS & GYNECOLOGY

## 2023-01-02 PROCEDURE — 99203 OFFICE O/P NEW LOW 30 MIN: CPT | Performed by: OBSTETRICS & GYNECOLOGY

## 2023-01-02 PROCEDURE — 36415 COLL VENOUS BLD VENIPUNCTURE: CPT

## 2023-01-02 PROCEDURE — 80053 COMPREHEN METABOLIC PANEL: CPT

## 2023-01-02 RX ORDER — CLOBETASOL PROPIONATE 0.5 MG/G
OINTMENT TOPICAL
Qty: 60 G | Refills: 0 | Status: SHIPPED | OUTPATIENT
Start: 2023-01-02 | End: 2024-01-31

## 2023-01-02 NOTE — PROGRESS NOTES
Progress Note    SUBJECTIVE:  Sola Silverman is an 57 year old, who requests an follow up of vulvar fissure.  History of midline anterior vulvar fissure, initially seen by her PCP. Was prescribed clobetasol Nov,2022. Currently using clobetasol once a day, decreased from twice daily. Patient reports symptoms have entirely resolved, denies any persistent vulvar irritation. No other concerns at this time.     Menstrual History:  No flowsheet data found.    Last    Lab Results   Component Value Date    PAP NIL 07/24/2020     History of abnormal Pap smear: NO - age 30- 65 PAP every 3 years recommended    Last   Lab Results   Component Value Date    HPV16 Negative 07/24/2020     Last   Lab Results   Component Value Date    HPV18 Negative 07/24/2020     Last   Lab Results   Component Value Date    HRHPV Negative 07/24/2020       Mammogram current: yes  Last Mammogram: 11/22/2022    Last Colonoscopy:  Results for orders placed or performed during the hospital encounter of 04/23/21   COLONOSCOPY   Result Value Ref Range    COLONOSCOPY       [facility]  [address] [place], [place] [address]., [place] [address] [phone]     Endoscopy Department  _______________________________________________________________________________  Patient Name: Sola Silverman           Procedure Date: 4/23/2021 1:14 PM  MRN: [id number]                       Account Number: [id number]  YOB: 1965             Admit Type: Outpatient  Age: 55                               Room: Pro 5  Gender: Female                        Note Status: Finalized  Attending MD: Huyen Campos for the Cause: Time out was   completed.  Total Sedation Time:                    _______________________________________________________________________________     Procedure:           Colonoscopy  Indications:         Screening for colorectal malignant neoplasm  Providers:           Danika Campos  Patient Profile:     This is a 55  year old female with BRCA who presents for                        colonoscopy. No family history of C RC. Non smoker.                        Asymptomatic.  Referring MD:        Alda Santamaria MD  Medicines:           Midazolam 4 mg IV, Fentanyl 200 micrograms IV  Complications:       No immediate complications.  _______________________________________________________________________________  Procedure:           After obtaining informed consent, the colonoscope was                        passed under direct vision. Throughout the procedure,                        the patient's blood pressure, pulse, and oxygen                        saturations were monitored continuously. The Colonoscope                        was introduced through the anus and advanced to the                        terminal ileum. The Colonoscope was introduced through                        the and advanced to.                                                                                   Findings:       The perianal and digital rectal examinations were normal.       A 3 mm polyp was found in the ascending colon.  The polyp was sessile.        The polyp was removed with a cold snare. Resection and retrieval were        complete.       Two sessile polyps were found in the recto-sigmoid colon. The polyps        were 1 mm in size. These polyps were removed with a jumbo cold forceps.        Resection and retrieval were complete.       A few small-mouthed diverticula were found in the recto-sigmoid colon.       Retroflexion in the right colon was performed.       The terminal ileum appeared normal.       The exam was otherwise without abnormality on direct and retroflexion        views.                                                                                   Moderate Sedation:       Moderate (conscious) sedation was administered by the endoscopy nurse        and supervised by the endoscopist. The following parameters were        monitored:  oxygen saturation, heart rate, blood pressure, and response        to care. Total physician intraservice time was 38 minutes.  Impression:          Three  small polyps resected during today's exam. Colon                        was fixed and tortuous and required a pediatric scope to                        advance past the sigmoid colon.                       - One 3 mm polyp in the ascending colon, removed with a                        cold snare. Resected and retrieved.                       - Two 1 mm polyps at the recto-sigmoid colon, removed                        with a jumbo cold forceps. Resected and retrieved.                       - Diverticulosis in the recto-sigmoid colon.                       - The examined portion of the ileum was normal.                       - The examination was otherwise normal on direct and                        retroflexion views.  Recommendation:      - Discharge patient to home (with escort).                       - Resume previous diet.                       - Continue present medications.                       - Await pathology results.                       - Repeat colonoscopy for surveillance  based on pathology                        results.                       - Return to referring physician.                                                                                     Electronically signed by: Aquiles Fonseca MD  ____________________  Aquiles Fonseca,   4/23/2021 2:37:10 PM  I was physically present for the entire viewing portion of the exam.  __________________________  Signature of teaching physician  Aquiles Fonseca  Number of Addenda: 0    Note Initiated On: 4/23/2021 1:14 PM  Scope In:  Scope Out:         HISTORY:  aspirin 81 MG tablet, Take 1 tablet by mouth daily.  blood glucose (ACCU-CHEK SMARTVIEW) test strip, TEST FOUR TIMES DAILY AS DIRECTED  blood glucose (NO BRAND SPECIFIED) lancets standard, Use to test blood sugar 2 times daily or as  directed.  blood glucose monitoring (ACCU-CHEK COMPACT CARE KIT) meter device kit, Test 4 times daily.  May dispense whatever type of brand is covered by patients insurance.  Calcium-Vitamin D (CALCIUM + D PO), Take 1 tablet by mouth daily.  Cetirizine HCl (ZYRTEC PO), Take 1 tablet by mouth as needed.  clobetasol (TEMOVATE) 0.05 % external ointment, Apply topically 2 times daily  estradiol (YUVAFEM) 10 MCG TABS vaginal tablet, INSERT 1 TABLET VAGINALLY  TWICE WEEKLY  hydrochlorothiazide (HYDRODIURIL) 12.5 MG tablet, Take 1 tablet (12.5 mg) by mouth daily  losartan (COZAAR) 100 MG tablet, Take 1 tablet (100 mg) by mouth daily  metFORMIN (GLUCOPHAGE XR) 500 MG 24 hr tablet, Take 2 tablets (1,000 mg) by mouth 2 times daily (with meals)  simvastatin (ZOCOR) 20 MG tablet, Take 1 tablet (20 mg) by mouth daily    lidocaine (PF) (XYLOCAINE) 1 % injection 10 mL  lidocaine (PF) (XYLOCAINE) 1 % injection 4 mL  lidocaine 1% with EPINEPHrine 1:100,000 injection 10 mL  triamcinolone (KENALOG-40) injection 40 mg  triamcinolone (KENALOG-40) injection 40 mg      Allergies   Allergen Reactions     Seasonal Allergies      Latex Itching, Swelling and Rash     Immunization History   Administered Date(s) Administered     COVID-19 Vaccine 12+ (Pfizer) 04/30/2021, 05/21/2021, 12/27/2021     COVID-19 Vaccine Bivalent Booster 12+ (Pfizer) 09/30/2022     FLU 6-35 months 12/09/2009, 09/13/2015, 11/27/2016     Flu, Unspecified 10/18/2010     HepB-Adult 07/24/2020, 08/28/2020     Influenza (H1N1) 02/08/2010     Influenza (IIV3) PF 01/14/2005, 11/14/2008, 12/09/2009, 10/18/2010, 01/12/2013, 12/06/2013, 10/18/2014     Influenza Vaccine >6 months (Alfuria,Fluzone) 10/04/2017, 11/05/2018, 11/08/2019, 12/03/2020, 09/30/2022     Influenza,INJ,MDCK,PF,Quad >6mo(Flucelvax) 11/13/2021     Pneumo Conj 13-V (2010&after) 11/08/2019     Pneumococcal 23 valent 07/13/2016     TDAP Vaccine (Boostrix) 07/13/2016     Td (Adult), Adsorbed 12/31/1996     Tdap  "(Adacel,Boostrix) 03/29/2006, 07/13/2016     Zoster vaccine, live 07/24/2020       OB History   No obstetric history on file.     Past Medical History:   Diagnosis Date     Allergy      Ankle joint pain      BRCA2 positive      Depression      Diabetes (H)      Hyperlipidemia      Hypertension      Lumbago      Morbid obesity (H)      Past Surgical History:   Procedure Laterality Date     ABDOMEN SURGERY       COLONOSCOPY N/A 4/23/2021    Procedure: COLONOSCOPY, WITH POLYPECTOMY AND BIOPSY;  Surgeon: Aquiles Fonseca MD;  Location: [facility] OR     EXCISE MASS LOWER EXTREMITY  4/11/2011    Procedure:EXCISE MASS LOWER EXTREMITY; Wound Mass; Surgeon:DREW LAURA; Location:[facility] OR     FOOT SURGERY  2000    left     KNEE SURGERY  1986    left     SALPINGO-OOPHORECTOMY BILATERAL       Family History   Problem Relation Age of Onset     C.A.D. Other      Diabetes Other      Diabetes Other      Cancer Other         Ovary     Depression Mother      Depression Father      Nystagmus No family hx of      Anxiety Disorder No family hx of      Obesity No family hx of      Breast Cancer No family hx of      Hyperlipidemia No family hx of      Social History     Socioeconomic History     Marital status: Single   Tobacco Use     Smoking status: Never     Smokeless tobacco: Never   Substance and Sexual Activity     Alcohol use: Yes     Comment: rare     Drug use: No     Sexual activity: Never   Social History Narrative    [employer]   [employer] Researcher, no tobacco, occasional alcohol on holidays.  Single, no children.       ROS  Negative aside otherwise noted in HPI.     EXAM:  Blood pressure 139/86, pulse 79, height 1.715 m (5' 7.52\"), weight 112.9 kg (248 lb 13.6 oz), not currently breastfeeding. Body mass index is 38.38 kg/m .  General - pleasant female in no acute distress.  Skin - no suspicious lesions or rashes  Lungs -  Breathing comfortably on RA  Musculoskeletal - no gross deformities.  Neurological - " no focal deficit     Pelvic Exam:  EG/BUS: Normal genital architecture without lesions, erythema or abnormal secretions Bartholin's, Urethra, [place]'s normal. Evidence of taut skin lichenification and atrophy. No fissure.    Urethral meatus: normal   Urethra: no masses, tenderness, or scarring     ASSESSMENT/ PLAN:  Sola Silverman is an 57 year old here for follow up of vulvar fissure, symptoms have resolved. Exam consistent with healed fissure/lichen sclerosis and vaginal atrophy.     1. Lichen Sclerosis  - Recommend clobetasol twice a week for maintenance. Bi-annual exams and s/s to come in sooner for were discussed.      Return to clinic in one year.  Follow-up as needed.    Patient seen and discussed with attending physician, Dr. London Coleman, MS3  [employer]    The above patient was seen and evaluated with the medical student who acted as my scribe for the above note. Agree with note, changes made as appropriate.    Monica Callahan MD

## 2023-01-02 NOTE — LETTER
1/2/2023       RE: Sola Silverman  [address] [place] [address]     Dear Colleague,    Thank you for referring your patient, Sola Silverman, to the [facility] [place] at [facility]. Please see a copy of my visit note below.    Progress Note    SUBJECTIVE:  Sola Silverman is an 57 year old, who requests an follow up of vulvar fissure.  History of midline anterior vulvar fissure, initially seen by her PCP. Was prescribed clobetasol Nov,2022. Currently using clobetasol once a day, decreased from twice daily. Patient reports symptoms have entirely resolved, denies any persistent vulvar irritation. No other concerns at this time.     Menstrual History:  No flowsheet data found.    Last    Lab Results   Component Value Date    PAP NIL 07/24/2020     History of abnormal Pap smear: NO - age 30- 65 PAP every 3 years recommended    Last   Lab Results   Component Value Date    HPV16 Negative 07/24/2020     Last   Lab Results   Component Value Date    HPV18 Negative 07/24/2020     Last   Lab Results   Component Value Date    HRHPV Negative 07/24/2020       Mammogram current: yes  Last Mammogram: 11/22/2022    Last Colonoscopy:  Results for orders placed or performed during the hospital encounter of 04/23/21   COLONOSCOPY   Result Value Ref Range    COLONOSCOPY       [facility]  [address] [place] [address]., [place] [address] [phone]     Endoscopy Department  _______________________________________________________________________________  Patient Name: Sola Silverman           Procedure Date: 4/23/2021 1:14 PM  MRN: [id number]                       Account Number: [id number]  YOB: 1965             Admit Type: Outpatient  Age: 55                               Room: Pro 5  Gender: Female                        Note Status: Finalized  Attending MD: Aquiles Fonseca ,    Pause for the Cause: Time out was    completed.  Total Sedation Time:                    _______________________________________________________________________________     Procedure:           Colonoscopy  Indications:         Screening for colorectal malignant neoplasm  Providers:           Danika Campos  Patient Profile:     This is a 55 year old female with BRCA who presents for                        colonoscopy. No family history of C RC. Non smoker.                        Asymptomatic.  Referring MD:        Adla Santamaria MD  Medicines:           Midazolam 4 mg IV, Fentanyl 200 micrograms IV  Complications:       No immediate complications.  _______________________________________________________________________________  Procedure:           After obtaining informed consent, the colonoscope was                        passed under direct vision. Throughout the procedure,                        the patient's blood pressure, pulse, and oxygen                        saturations were monitored continuously. The Colonoscope                        was introduced through the anus and advanced to the                        terminal ileum. The Colonoscope was introduced through                        the and advanced to.                                                                                   Findings:       The perianal and digital rectal examinations were normal.       A 3 mm polyp was found in the ascending colon.  The polyp was sessile.        The polyp was removed with a cold snare. Resection and retrieval were        complete.       Two sessile polyps were found in the recto-sigmoid colon. The polyps        were 1 mm in size. These polyps were removed with a jumbo cold forceps.        Resection and retrieval were complete.       A few small-mouthed diverticula were found in the recto-sigmoid colon.       Retroflexion in the right colon was performed.       The terminal ileum appeared normal.       The exam was otherwise  without abnormality on direct and retroflexion        views.                                                                                   Moderate Sedation:       Moderate (conscious) sedation was administered by the endoscopy nurse        and supervised by the endoscopist. The following parameters were        monitored: oxygen saturation, heart rate, blood pressure, and response        to care. Total physician intraservice time was 38 minutes.  Impression:          Three  small polyps resected during today's exam. Colon                        was fixed and tortuous and required a pediatric scope to                        advance past the sigmoid colon.                       - One 3 mm polyp in the ascending colon, removed with a                        cold snare. Resected and retrieved.                       - Two 1 mm polyps at the recto-sigmoid colon, removed                        with a jumbo cold forceps. Resected and retrieved.                       - Diverticulosis in the recto-sigmoid colon.                       - The examined portion of the ileum was normal.                       - The examination was otherwise normal on direct and                        retroflexion views.  Recommendation:      - Discharge patient to home (with escort).                       - Resume previous diet.                       - Continue present medications.                       - Await pathology results.                       - Repeat colonoscopy for surveillance  based on pathology                        results.                       - Return to referring physician.                                                                                     Electronically signed by: Aquiles Fonseca MD  ____________________  Aquiles Fonseca,   4/23/2021 2:37:10 PM  I was physically present for the entire viewing portion of the exam.  __________________________  Signature of teaching physician  Aquiles Fonseca  Number of Addenda:  0    Note Initiated On: 4/23/2021 1:14 PM  Scope In:  Scope Out:         HISTORY:  aspirin 81 MG tablet, Take 1 tablet by mouth daily.  blood glucose (ACCU-CHEK SMARTVIEW) test strip, TEST FOUR TIMES DAILY AS DIRECTED  blood glucose (NO BRAND SPECIFIED) lancets standard, Use to test blood sugar 2 times daily or as directed.  blood glucose monitoring (ACCU-CHEK COMPACT CARE KIT) meter device kit, Test 4 times daily.  May dispense whatever type of brand is covered by patients insurance.  Calcium-Vitamin D (CALCIUM + D PO), Take 1 tablet by mouth daily.  Cetirizine HCl (ZYRTEC PO), Take 1 tablet by mouth as needed.  clobetasol (TEMOVATE) 0.05 % external ointment, Apply topically 2 times daily  estradiol (YUVAFEM) 10 MCG TABS vaginal tablet, INSERT 1 TABLET VAGINALLY  TWICE WEEKLY  hydrochlorothiazide (HYDRODIURIL) 12.5 MG tablet, Take 1 tablet (12.5 mg) by mouth daily  losartan (COZAAR) 100 MG tablet, Take 1 tablet (100 mg) by mouth daily  metFORMIN (GLUCOPHAGE XR) 500 MG 24 hr tablet, Take 2 tablets (1,000 mg) by mouth 2 times daily (with meals)  simvastatin (ZOCOR) 20 MG tablet, Take 1 tablet (20 mg) by mouth daily    lidocaine (PF) (XYLOCAINE) 1 % injection 10 mL  lidocaine (PF) (XYLOCAINE) 1 % injection 4 mL  lidocaine 1% with EPINEPHrine 1:100,000 injection 10 mL  triamcinolone (KENALOG-40) injection 40 mg  triamcinolone (KENALOG-40) injection 40 mg      Allergies   Allergen Reactions     Seasonal Allergies      Latex Itching, Swelling and Rash     Immunization History   Administered Date(s) Administered     COVID-19 Vaccine 12+ (Pfizer) 04/30/2021, 05/21/2021, 12/27/2021     COVID-19 Vaccine Bivalent Booster 12+ (Pfizer) 09/30/2022     FLU 6-35 months 12/09/2009, 09/13/2015, 11/27/2016     Flu, Unspecified 10/18/2010     HepB-Adult 07/24/2020, 08/28/2020     Influenza (H1N1) 02/08/2010     Influenza (IIV3) PF 01/14/2005, 11/14/2008, 12/09/2009, 10/18/2010, 01/12/2013, 12/06/2013, 10/18/2014     Influenza Vaccine  >6 months (Alfuria,Fluzone) 10/04/2017, 11/05/2018, 11/08/2019, 12/03/2020, 09/30/2022     Influenza,INJ,MDCK,PF,Quad >6mo(Flucelvax) 11/13/2021     Pneumo Conj 13-V (2010&after) 11/08/2019     Pneumococcal 23 valent 07/13/2016     TDAP Vaccine (Boostrix) 07/13/2016     Td (Adult), Adsorbed 12/31/1996     Tdap (Adacel,Boostrix) 03/29/2006, 07/13/2016     Zoster vaccine, live 07/24/2020       OB History   No obstetric history on file.     Past Medical History:   Diagnosis Date     Allergy      Ankle joint pain      BRCA2 positive      Depression      Diabetes (H)      Hyperlipidemia      Hypertension      Lumbago      Morbid obesity (H)      Past Surgical History:   Procedure Laterality Date     ABDOMEN SURGERY       COLONOSCOPY N/A 4/23/2021    Procedure: COLONOSCOPY, WITH POLYPECTOMY AND BIOPSY;  Surgeon: Aquiles Fonseca MD;  Location: [facility] OR     EXCISE MASS LOWER EXTREMITY  4/11/2011    Procedure:EXCISE MASS LOWER EXTREMITY; Wound Mass; Surgeon:DREW LAURA; Location:[facility] OR     FOOT SURGERY  2000    left     KNEE SURGERY  1986    left     SALPINGO-OOPHORECTOMY BILATERAL       Family History   Problem Relation Age of Onset     C.A.D. Other      Diabetes Other      Diabetes Other      Cancer Other         Ovary     Depression Mother      Depression Father      Nystagmus No family hx of      Anxiety Disorder No family hx of      Obesity No family hx of      Breast Cancer No family hx of      Hyperlipidemia No family hx of      Social History     Socioeconomic History     Marital status: Single   Tobacco Use     Smoking status: Never     Smokeless tobacco: Never   Substance and Sexual Activity     Alcohol use: Yes     Comment: rare     Drug use: No     Sexual activity: Never   Social History Narrative    [employer]   Medicine Researcher, no tobacco, occasional alcohol on holidays.  Single, no children.       ROS  Negative aside otherwise noted in HPI.     EXAM:  Blood pressure 139/86,  "pulse 79, height 1.715 m (5' 7.52\"), weight 112.9 kg (248 lb 13.6 oz), not currently breastfeeding. Body mass index is 38.38 kg/m .  General - pleasant female in no acute distress.  Skin - no suspicious lesions or rashes  Lungs -  Breathing comfortably on RA  Musculoskeletal - no gross deformities.  Neurological - no focal deficit     Pelvic Exam:  EG/BUS: Normal genital architecture without lesions, erythema or abnormal secretions Bartholin's, Urethra, [place]'s normal. Evidence of taut skin lichenification and atrophy. No fissure.    Urethral meatus: normal   Urethra: no masses, tenderness, or scarring     ASSESSMENT/ PLAN:  Sola Silverman is an 57 year old here for follow up of vulvar fissure, symptoms have resolved. Exam consistent with healed fissure/lichen sclerosis and vaginal atrophy.     1. Lichen Sclerosis  - Recommend clobetasol twice a week for maintenance. Bi-annual exams and s/s to come in sooner for were discussed.      Return to clinic in one year.  Follow-up as needed.    Patient seen and discussed with attending physician, Dr. London Coleman, MS3  [employer]    The above patient was seen and evaluated with the medical student who acted as my scribe for the above note. Agree with note, changes made as appropriate.    Monica Callahan MD      "

## 2023-03-17 DIAGNOSIS — M17.11 OSTEOARTHRITIS OF RIGHT PATELLOFEMORAL JOINT: Primary | ICD-10-CM

## 2023-03-23 ENCOUNTER — OFFICE VISIT (OUTPATIENT)
Dept: ORTHOPEDICS | Facility: CLINIC | Age: 58
End: 2023-03-23
Payer: COMMERCIAL

## 2023-03-23 DIAGNOSIS — M17.11 OSTEOARTHRITIS OF RIGHT PATELLOFEMORAL JOINT: Primary | ICD-10-CM

## 2023-03-23 PROCEDURE — 20610 DRAIN/INJ JOINT/BURSA W/O US: CPT | Mod: RT | Performed by: FAMILY MEDICINE

## 2023-03-23 RX ORDER — LIDOCAINE HYDROCHLORIDE 10 MG/ML
4 INJECTION, SOLUTION EPIDURAL; INFILTRATION; INTRACAUDAL; PERINEURAL
Status: SHIPPED | OUTPATIENT
Start: 2023-03-23

## 2023-03-23 RX ORDER — TRIAMCINOLONE ACETONIDE 40 MG/ML
40 INJECTION, SUSPENSION INTRA-ARTICULAR; INTRAMUSCULAR
Status: SHIPPED | OUTPATIENT
Start: 2023-03-23

## 2023-03-23 RX ADMIN — LIDOCAINE HYDROCHLORIDE 4 ML: 10 INJECTION, SOLUTION EPIDURAL; INFILTRATION; INTRACAUDAL; PERINEURAL at 07:12

## 2023-03-23 RX ADMIN — TRIAMCINOLONE ACETONIDE 40 MG: 40 INJECTION, SUSPENSION INTRA-ARTICULAR; INTRAMUSCULAR at 07:12

## 2023-03-23 NOTE — PROGRESS NOTES
[facility]  SPORTS & ORTHOPEDIC CLINIC VISIT     Mar 23, 2023              Large Joint Injection/Arthocentesis: R knee joint    Date/Time: 3/23/2023 7:12 AM  Performed by: Kaiden Onofre MD  Authorized by: Kaiden Onofre MD     Indications:  Osteoarthritis  Needle Size comment:  23G  Guidance: landmark guided    Approach:  Anterolateral  Location:  Knee      Medications:  40 mg triamcinolone 40 MG/ML; 4 mL lidocaine (PF) 1 %  Outcome:  Tolerated well, no immediate complications  Procedure discussed: discussed risks, benefits, and alternatives    Consent Given by:  Patient  Timeout: timeout called immediately prior to procedure    Prep: patient was prepped and draped in usual sterile fashion       There were no complications. The patient tolerated the procedure well. There was minimal bleeding.   The patient was instructed to ice the knee upon leaving clinic and refrain from overuse over the next 2 days.   The patient was instructed to go to the emergency room with any unusual pain, swelling, or redness occurred in the injected area.     Kaiden Onofre MD

## 2023-03-23 NOTE — LETTER
3/23/2023      RE: Sola Silverman  [address] [place] [address]     Dear Colleague,    Thank you for referring your patient, Sola Silverman, to the [facility] [place]. Please see a copy of my visit note below.    [facility]  SPORTS & ORTHOPEDIC CLINIC VISIT     Mar 23, 2023    Large Joint Injection/Arthocentesis: R knee joint    Date/Time: 3/23/2023 7:12 AM  Performed by: Kaiden Onofre MD  Authorized by: Kaiden Onofre MD     Indications:  Osteoarthritis  Needle Size comment:  23G  Guidance: landmark guided    Approach:  Anterolateral  Location:  Knee    Medications:  40 mg triamcinolone 40 MG/ML; 4 mL lidocaine (PF) 1 %  Outcome:  Tolerated well, no immediate complications  Procedure discussed: discussed risks, benefits, and alternatives    Consent Given by:  Patient  Timeout: timeout called immediately prior to procedure    Prep: patient was prepped and draped in usual sterile fashion       There were no complications. The patient tolerated the procedure well. There was minimal bleeding.   The patient was instructed to ice the knee upon leaving clinic and refrain from overuse over the next 2 days.   The patient was instructed to go to the emergency room with any unusual pain, swelling, or redness occurred in the injected area.     Kaiden Onofre MD

## 2023-03-23 NOTE — NURSING NOTE
[address] [address]  Dept: [phone]  ______________________________________________________________________________    Patient: Sola Silverman   : 1965   MRN: [id number]   2023    INVASIVE PROCEDURE SAFETY CHECKLIST    Date: 2023   Procedure:R knee CSI   Patient Name: Sola Silverman  MRN: [id number]  YOB: 1965    Action: Complete sections as appropriate. Any discrepancy results in a HARD COPY until resolved.     PRE PROCEDURE:  Patient ID verified with 2 identifiers (name and  or MRN): Yes  Procedure and site verified with patient/designee (when able): Yes  Accurate consent documentation in medical record: Yes  H&P (or appropriate assessment) documented in medical record: Yes  H&P must be up to 20 days prior to procedure and updates within 24 hours of procedure as applicable: Yes  Relevant diagnostic and radiology test results appropriately labeled and displayed as applicable: Yes  Procedure site(s) marked with provider initials: NA    TIMEOUT:  Time-Out performed immediately prior to starting procedure, including verbal and active participation of all team members addressing the following:Yes  * Correct patient identify  * Confirmed that the correct side and site are marked  * An accurate procedure consent form  * Agreement on the procedure to be done  * Correct patient position  * Relevant images and results are properly labeled and appropriately displayed  * The need to administer antibiotics or fluids for irrigation purposes during the procedure as applicable   * Safety precautions based on patient history or medication use    DURING PROCEDURE: Verification of correct person, site, and procedures any time the responsibility for care of the patient is transferred to another member of the care team.       Prior to injection, verified patient identity using patient's name and date of  birth.  Due to injection administration, patient instructed to remain in clinic for 15 minutes  afterwards, and to report any adverse reaction to me immediately.    Joint injection was performed.      Drug Amount Wasted:  None.  Vial/Syringe: Single dose vial  Expiration Date:  12/1/26 10/1/24      Jodi Rasheed, ATC  March 23, 2023

## 2023-03-27 ENCOUNTER — OFFICE VISIT (OUTPATIENT)
Dept: INTERNAL MEDICINE | Facility: CLINIC | Age: 58
End: 2023-03-27
Attending: INTERNAL MEDICINE
Payer: COMMERCIAL

## 2023-03-27 ENCOUNTER — APPOINTMENT (OUTPATIENT)
Dept: LAB | Facility: CLINIC | Age: 58
End: 2023-03-27
Attending: INTERNAL MEDICINE
Payer: COMMERCIAL

## 2023-03-27 VITALS
HEART RATE: 74 BPM | BODY MASS INDEX: 37.94 KG/M2 | DIASTOLIC BLOOD PRESSURE: 85 MMHG | WEIGHT: 246 LBS | SYSTOLIC BLOOD PRESSURE: 136 MMHG

## 2023-03-27 DIAGNOSIS — E11.9 TYPE 2 DIABETES MELLITUS WITHOUT COMPLICATION, WITHOUT LONG-TERM CURRENT USE OF INSULIN (H): ICD-10-CM

## 2023-03-27 DIAGNOSIS — N95.2 ATROPHIC VAGINITIS: ICD-10-CM

## 2023-03-27 DIAGNOSIS — I10 ESSENTIAL HYPERTENSION: ICD-10-CM

## 2023-03-27 DIAGNOSIS — E78.01 HYPERLIPIDEMIA TYPE II: ICD-10-CM

## 2023-03-27 LAB — HBA1C MFR BLD: 6.9 %

## 2023-03-27 PROCEDURE — 83036 HEMOGLOBIN GLYCOSYLATED A1C: CPT

## 2023-03-27 PROCEDURE — 99212 OFFICE O/P EST SF 10 MIN: CPT | Performed by: INTERNAL MEDICINE

## 2023-03-27 PROCEDURE — 99214 OFFICE O/P EST MOD 30 MIN: CPT | Performed by: INTERNAL MEDICINE

## 2023-03-27 PROCEDURE — 36415 COLL VENOUS BLD VENIPUNCTURE: CPT

## 2023-03-27 RX ORDER — LOSARTAN POTASSIUM 100 MG/1
100 TABLET ORAL DAILY
Qty: 90 TABLET | Refills: 3 | Status: SHIPPED | OUTPATIENT
Start: 2023-03-27 | End: 2024-04-15

## 2023-03-27 RX ORDER — SIMVASTATIN 20 MG
20 TABLET ORAL DAILY
Qty: 90 TABLET | Refills: 3 | Status: SHIPPED | OUTPATIENT
Start: 2023-03-27 | End: 2024-04-15

## 2023-03-27 RX ORDER — METFORMIN HCL 500 MG
1000 TABLET, EXTENDED RELEASE 24 HR ORAL 2 TIMES DAILY WITH MEALS
Qty: 180 TABLET | Refills: 3 | Status: SHIPPED | OUTPATIENT
Start: 2023-03-27 | End: 2024-04-15

## 2023-03-27 RX ORDER — HYDROCHLOROTHIAZIDE 12.5 MG/1
12.5 TABLET ORAL DAILY
Qty: 90 TABLET | Refills: 1 | Status: SHIPPED | OUTPATIENT
Start: 2023-03-27 | End: 2023-10-10

## 2023-03-27 RX ORDER — BLOOD SUGAR DIAGNOSTIC
STRIP MISCELLANEOUS
Qty: 100 STRIP | Refills: 3 | Status: SHIPPED | OUTPATIENT
Start: 2023-03-27

## 2023-03-27 RX ORDER — ESTRADIOL 10 UG/1
INSERT VAGINAL
Qty: 24 TABLET | Refills: 3 | Status: SHIPPED | OUTPATIENT
Start: 2023-03-27 | End: 2024-04-15

## 2023-03-27 ASSESSMENT — ANXIETY QUESTIONNAIRES
GAD7 TOTAL SCORE: 0
7. FEELING AFRAID AS IF SOMETHING AWFUL MIGHT HAPPEN: NOT AT ALL
GAD7 TOTAL SCORE: 0
2. NOT BEING ABLE TO STOP OR CONTROL WORRYING: NOT AT ALL
6. BECOMING EASILY ANNOYED OR IRRITABLE: NOT AT ALL
1. FEELING NERVOUS, ANXIOUS, OR ON EDGE: NOT AT ALL
3. WORRYING TOO MUCH ABOUT DIFFERENT THINGS: NOT AT ALL
5. BEING SO RESTLESS THAT IT IS HARD TO SIT STILL: NOT AT ALL

## 2023-03-27 ASSESSMENT — PATIENT HEALTH QUESTIONNAIRE - PHQ9
SUM OF ALL RESPONSES TO PHQ QUESTIONS 1-9: 0
5. POOR APPETITE OR OVEREATING: NOT AT ALL

## 2023-03-27 NOTE — LETTER
3/27/2023     RE: Sola Silverman  [address] [place] [address]     Dear Colleague,    Thank you for referring your patient, Sola Silverman, to the [facility] [place] at [facility]. Please see a copy of my visit note below.    Chief Complaint   Patient presents with     RECHECK     B/P check   Gina Parker LPN      Assessment & Plan     Hyperlipidemia type II  Discussed management of hyperlipidemia. Will continue with simvastatin at current dose.   - losartan (COZAAR) 100 MG tablet; Take 1 tablet (100 mg) by mouth daily  - simvastatin (ZOCOR) 20 MG tablet; Take 1 tablet (20 mg) by mouth daily    Atrophic vaginitis  Discussed management of atrophic vaginitis.  We will continue with current topical therapy without changes.  - estradiol (YUVAFEM) 10 MCG TABS vaginal tablet; INSERT 1 TABLET VAGINALLY  TWICE WEEKLY    Essential hypertension  Patient is able to tolerate antihypertensive therapy without any issues.  She is currently on losartan and hydrochlorothiazide.  Her blood pressures within acceptable range.  No medication changes were made today.  - hydrochlorothiazide (HYDRODIURIL) 12.5 MG tablet; Take 1 tablet (12.5 mg) by mouth daily    Type 2 diabetes mellitus without complication, without long-term current use of insulin (H)  Discussed goals of diabetic care with patient.   Briefly discussed options for management of diabetes, including possible semaglutide injections as that would be beneficial for weight loss as well as diabetes management.  In the interim, will continue with metformin. Recommend eye exam  - metFORMIN (GLUCOPHAGE XR) 500 MG 24 hr tablet; Take 2 tablets (1,000 mg) by mouth 2 times daily (with meals)  - blood glucose (ACCU-CHEK SMARTVIEW) test strip; TEST FOUR TIMES DAILY AS DIRECTED  - blood glucose (NO BRAND SPECIFIED) lancets standard; Use to test blood sugar 2 times daily or as directed.  - Hemoglobin  A1c; Future  - Adult Eye  Referral; Future    I spent a total of 30 minutes on the day of the visit.   Time spent by me doing chart review, history and exam, documentation and further activities per the note    Return in about 3 months (around 6/27/2023).    Alda Santamaria MD  [facility] CINDY [place] is a 57 year old, presenting for the following health issues:  RECHECK (B/P check)  No flowsheet data found.  HPI     Patient is here for follow-up on diabetes.  She reports that she has been doing well.  She denies medication related side effects.  She is up-to-date on most vaccinations.  She is due for an eye exam and would prefer to have a referral.      Review of Systems   Constitutional, HEENT, cardiovascular, pulmonary, GI, , musculoskeletal, neuro, skin, endocrine and psych systems are negative, except as otherwise noted.     Objective    /85   Pulse 74   Wt 111.6 kg (246 lb)   BMI 37.94 kg/m    Body mass index is 37.94 kg/m .  Physical Exam   GENERAL: healthy, alert and no distress  EYES: Eyes grossly normal to inspection, PERRL and conjunctivae and sclerae normal  RESP: normal effort, no wheezing  CV: regular rates and rhythm and no peripheral edema  MS: no gross musculoskeletal defects noted, no edema    Again, thank you for allowing me to participate in the care of your patient.      Sincerely,    Alda Santamaria MD

## 2023-03-27 NOTE — PATIENT INSTRUCTIONS
Thank you for trusting us with your care!     If you need to contact us for questions about:    Symptoms, Scheduling & Medical Questions: Non-urgent (2-3 day response) Anna message, Urgent (needing response today) [phone] (if after 3:30pm next day response)   Prescriptions: Please call your Pharmacy   Billing: [place] [phone] or [facility]:[phone]

## 2023-03-27 NOTE — PROGRESS NOTES
Assessment & Plan     Hyperlipidemia type II  Discussed management of hyperlipidemia. Will continue with simvastatin at current dose.   - losartan (COZAAR) 100 MG tablet; Take 1 tablet (100 mg) by mouth daily  - simvastatin (ZOCOR) 20 MG tablet; Take 1 tablet (20 mg) by mouth daily    Atrophic vaginitis  Discussed management of atrophic vaginitis.  We will continue with current topical therapy without changes.  - estradiol (YUVAFEM) 10 MCG TABS vaginal tablet; INSERT 1 TABLET VAGINALLY  TWICE WEEKLY    Essential hypertension  Patient is able to tolerate antihypertensive therapy without any issues.  She is currently on losartan and hydrochlorothiazide.  Her blood pressures within acceptable range.  No medication changes were made today.  - hydrochlorothiazide (HYDRODIURIL) 12.5 MG tablet; Take 1 tablet (12.5 mg) by mouth daily    Type 2 diabetes mellitus without complication, without long-term current use of insulin (H)  Discussed goals of diabetic care with patient.   Briefly discussed options for management of diabetes, including possible semaglutide injections as that would be beneficial for weight loss as well as diabetes management.  In the interim, will continue with metformin. Recommend eye exam  - metFORMIN (GLUCOPHAGE XR) 500 MG 24 hr tablet; Take 2 tablets (1,000 mg) by mouth 2 times daily (with meals)  - blood glucose (ACCU-CHEK SMARTVIEW) test strip; TEST FOUR TIMES DAILY AS DIRECTED  - blood glucose (NO BRAND SPECIFIED) lancets standard; Use to test blood sugar 2 times daily or as directed.  - Hemoglobin A1c; Future  - Adult Eye  Referral; Future      I spent a total of 30 minutes on the day of the visit.   Time spent by me doing chart review, history and exam, documentation and further activities per the note           Return in about 3 months (around 6/27/2023).    Alda Santamaria MD  [facility] [place]    Heather Sel[place] is a 57 year old, presenting for  the following health issues:  RECHECK (B/P check)  No flowsheet data found.  HPI     Patient is here for follow-up on diabetes.  She reports that she has been doing well.  She denies medication related side effects.  She is up-to-date on most vaccinations.  She is due for an eye exam and would prefer to have a referral.      Review of Systems   Constitutional, HEENT, cardiovascular, pulmonary, GI, , musculoskeletal, neuro, skin, endocrine and psych systems are negative, except as otherwise noted.      Objective    /85   Pulse 74   Wt 111.6 kg (246 lb)   BMI 37.94 kg/m    Body mass index is 37.94 kg/m .  Physical Exam   GENERAL: healthy, alert and no distress  EYES: Eyes grossly normal to inspection, PERRL and conjunctivae and sclerae normal  RESP: normal effort, no wheezing  CV: regular rates and rhythm and no peripheral edema  MS: no gross musculoskeletal defects noted, no edema

## 2023-05-10 ENCOUNTER — TRANSFERRED RECORDS (OUTPATIENT)
Dept: HEALTH INFORMATION MANAGEMENT | Facility: CLINIC | Age: 58
End: 2023-05-10
Payer: COMMERCIAL

## 2023-05-10 LAB — RETINOPATHY: NEGATIVE

## 2023-06-26 ENCOUNTER — OFFICE VISIT (OUTPATIENT)
Dept: INTERNAL MEDICINE | Facility: CLINIC | Age: 58
End: 2023-06-26
Attending: INTERNAL MEDICINE
Payer: COMMERCIAL

## 2023-06-26 ENCOUNTER — LAB (OUTPATIENT)
Dept: LAB | Facility: CLINIC | Age: 58
End: 2023-06-26
Attending: INTERNAL MEDICINE
Payer: COMMERCIAL

## 2023-06-26 VITALS
WEIGHT: 245 LBS | HEART RATE: 84 BPM | DIASTOLIC BLOOD PRESSURE: 81 MMHG | BODY MASS INDEX: 37.78 KG/M2 | SYSTOLIC BLOOD PRESSURE: 116 MMHG

## 2023-06-26 DIAGNOSIS — E11.9 TYPE 2 DIABETES MELLITUS WITHOUT COMPLICATION, WITHOUT LONG-TERM CURRENT USE OF INSULIN (H): Primary | ICD-10-CM

## 2023-06-26 DIAGNOSIS — E11.9 TYPE 2 DIABETES MELLITUS WITHOUT COMPLICATION, WITHOUT LONG-TERM CURRENT USE OF INSULIN (H): ICD-10-CM

## 2023-06-26 LAB
ALBUMIN SERPL BCG-MCNC: 4.4 G/DL (ref 3.5–5.2)
ALP SERPL-CCNC: 83 U/L (ref 35–104)
ALT SERPL W P-5'-P-CCNC: 27 U/L (ref 0–50)
ANION GAP SERPL CALCULATED.3IONS-SCNC: 12 MMOL/L (ref 7–15)
AST SERPL W P-5'-P-CCNC: 20 U/L (ref 0–45)
BILIRUB SERPL-MCNC: 0.3 MG/DL
BUN SERPL-MCNC: 20.7 MG/DL (ref 6–20)
CALCIUM SERPL-MCNC: 9.9 MG/DL (ref 8.6–10)
CHLORIDE SERPL-SCNC: 99 MMOL/L (ref 98–107)
CREAT SERPL-MCNC: 1.08 MG/DL (ref 0.51–0.95)
DEPRECATED HCO3 PLAS-SCNC: 29 MMOL/L (ref 22–29)
GFR SERPL CREATININE-BSD FRML MDRD: 60 ML/MIN/1.73M2
GLUCOSE SERPL-MCNC: 135 MG/DL (ref 70–99)
HBA1C MFR BLD: 6.6 %
POTASSIUM SERPL-SCNC: 4.5 MMOL/L (ref 3.4–5.3)
PROT SERPL-MCNC: 7.5 G/DL (ref 6.4–8.3)
SODIUM SERPL-SCNC: 140 MMOL/L (ref 136–145)

## 2023-06-26 PROCEDURE — 99213 OFFICE O/P EST LOW 20 MIN: CPT | Performed by: INTERNAL MEDICINE

## 2023-06-26 PROCEDURE — 83036 HEMOGLOBIN GLYCOSYLATED A1C: CPT

## 2023-06-26 PROCEDURE — 80053 COMPREHEN METABOLIC PANEL: CPT

## 2023-06-26 PROCEDURE — 36415 COLL VENOUS BLD VENIPUNCTURE: CPT

## 2023-06-26 NOTE — LETTER
"6/26/2023       RE: Sola Silverman  [address] [place] [address]     Dear Colleague,    Thank you for referring your patient, Sola Silverman, to the [facility] at [facility]. Please see a copy of my visit note below.    Chief Complaint   Patient presents with    RECHECK   Gina Parker LPN      Assessment & Plan     Type 2 diabetes mellitus without complication, without long-term current use of insulin (H)  Reviewed goals of glycemic control as well as diabetes management with patient.  Recommend checking hemoglobin A1c as well as comprehensive metabolic panel today.  Patient will be advised on test results accordingly.  Discussed vaccinations with patient, recommend shingles vaccine.  - Comprehensive metabolic panel; Future  - Hemoglobin A1c; Future      I spent a total of 20 minutes on the day of the visit.   Time spent by me doing chart review, history and exam, documentation and further activities per the note       BMI:   Estimated body mass index is 37.78 kg/m  as calculated from the following:    Height as of 1/2/23: 1.715 m (5' 7.52\").    Weight as of this encounter: 111.1 kg (245 lb).           No follow-ups on file.    Alda Santamaria MD  [facility]    Heather Selby is a 57 year old, presenting for the following health issues:  RECHECK         No data to display              HPI     Patient is here for follow up on diabetes. She reports that she has been having some loose stools, poor appetite and right upper quadrant pain. Her symptoms are intermittent in nature. She denies significant weight loss.       Review of Systems   Constitutional, HEENT, cardiovascular, pulmonary, GI, , musculoskeletal, neuro, skin, endocrine and psych systems are negative, except as otherwise noted.     Objective    /81   Pulse 84   Wt 111.1 kg (245 lb)   BMI 37.78 kg/m    Body " mass index is 37.78 kg/m .  Physical Exam   GENERAL: healthy, alert and no distress  RESP: lungs clear to auscultation - no rales, rhonchi or wheezes  CV: regular rate and rhythm, normal S1 S2, no S3 or S4, no murmur, click or rub, no peripheral edema and peripheral pulses strong  ABDOMEN: soft, nontender and bowel sounds normal  MS: no gross musculoskeletal defects noted, no edema  Diabetic foot exam: normal DP and PT pulses, no trophic changes or ulcerative lesions, normal sensory exam and normal monofilament exam

## 2023-06-26 NOTE — PATIENT INSTRUCTIONS
Thank you for trusting us with your care!     If you need to contact us for questions about:    Symptoms, Scheduling & Medical Questions: Non-urgent (2-3 day response) Anna message, Urgent (needing response today) [phone] (if after 3:30pm next day response)   Prescriptions: Please call your Pharmacy   Billing: [facility] [phone] or [facility]:[phone]

## 2023-06-26 NOTE — PROGRESS NOTES
"  Assessment & Plan     Type 2 diabetes mellitus without complication, without long-term current use of insulin (H)  Reviewed goals of glycemic control as well as diabetes management with patient.  Recommend checking hemoglobin A1c as well as comprehensive metabolic panel today.  Patient will be advised on test results accordingly.  Discussed vaccinations with patient, recommend shingles vaccine.  - Comprehensive metabolic panel; Future  - Hemoglobin A1c; Future      I spent a total of 20 minutes on the day of the visit.   Time spent by me doing chart review, history and exam, documentation and further activities per the note       BMI:   Estimated body mass index is 37.78 kg/m  as calculated from the following:    Height as of 1/2/23: 1.715 m (5' 7.52\").    Weight as of this encounter: 111.1 kg (245 lb).           No follow-ups on file.    Alda Santamaria MD  [facility] CINDY Selby is a 57 year old, presenting for the following health issues:  RECHECK         No data to display              HPI     Patient is here for follow up on diabetes. She reports that she has been having some loose stools, poor appetite and right upper quadrant pain. Her symptoms are intermittent in nature. She denies significant weight loss.       Review of Systems   Constitutional, HEENT, cardiovascular, pulmonary, GI, , musculoskeletal, neuro, skin, endocrine and psych systems are negative, except as otherwise noted.      Objective    /81   Pulse 84   Wt 111.1 kg (245 lb)   BMI 37.78 kg/m    Body mass index is 37.78 kg/m .  Physical Exam   GENERAL: healthy, alert and no distress  RESP: lungs clear to auscultation - no rales, rhonchi or wheezes  CV: regular rate and rhythm, normal S1 S2, no S3 or S4, no murmur, click or rub, no peripheral edema and peripheral pulses strong  ABDOMEN: soft, nontender and bowel sounds normal  MS: no gross musculoskeletal defects noted, no " edema  Diabetic foot exam: normal DP and PT pulses, no trophic changes or ulcerative lesions, normal sensory exam and normal monofilament exam

## 2023-10-10 DIAGNOSIS — I10 ESSENTIAL HYPERTENSION: ICD-10-CM

## 2023-10-10 RX ORDER — HYDROCHLOROTHIAZIDE 12.5 MG/1
12.5 TABLET ORAL DAILY
Qty: 90 TABLET | Refills: 1 | Status: SHIPPED | OUTPATIENT
Start: 2023-10-10 | End: 2024-04-15

## 2023-10-10 NOTE — TELEPHONE ENCOUNTER
hydrochlorothiazide (HYDRODIURIL) 12.5 MG tablet         Sig: Take 1 tablet (12.5 mg) by mouth daily     Last Written Prescription Date:  3/27/23  Last Fill Quantity: 90,   # refills: 1  Last Office Visit : 6/26/2023   Future Office visit:  none    Creatinine   Date Value Ref Range Status   06/26/2023 1.08 (H) 0.51 - 0.95 mg/dL Final   08/28/2020 0.93 0.52 - 1.04 mg/dL Final         Your labs were reviewed by me and are within acceptable ranges.  No changes are recommended.  Alda Santamaria MD   Written by Alda Santamaria MD on 6/29/2023 11:08 AM

## 2024-01-15 ENCOUNTER — LAB (OUTPATIENT)
Dept: LAB | Facility: CLINIC | Age: 59
End: 2024-01-15
Attending: INTERNAL MEDICINE
Payer: COMMERCIAL

## 2024-01-15 ENCOUNTER — OFFICE VISIT (OUTPATIENT)
Dept: INTERNAL MEDICINE | Facility: CLINIC | Age: 59
End: 2024-01-15
Attending: INTERNAL MEDICINE
Payer: COMMERCIAL

## 2024-01-15 VITALS
WEIGHT: 237.2 LBS | HEIGHT: 68 IN | DIASTOLIC BLOOD PRESSURE: 84 MMHG | HEART RATE: 73 BPM | SYSTOLIC BLOOD PRESSURE: 120 MMHG | BODY MASS INDEX: 35.95 KG/M2

## 2024-01-15 DIAGNOSIS — I10 ESSENTIAL HYPERTENSION, BENIGN: ICD-10-CM

## 2024-01-15 DIAGNOSIS — E11.9 TYPE 2 DIABETES MELLITUS WITHOUT COMPLICATION, WITHOUT LONG-TERM CURRENT USE OF INSULIN (H): ICD-10-CM

## 2024-01-15 DIAGNOSIS — Z15.01 BRCA GENE MUTATION POSITIVE IN FEMALE: ICD-10-CM

## 2024-01-15 DIAGNOSIS — E78.5 HYPERLIPIDEMIA WITH TARGET LDL LESS THAN 130: ICD-10-CM

## 2024-01-15 DIAGNOSIS — Z15.02 BRCA GENE MUTATION POSITIVE IN FEMALE: ICD-10-CM

## 2024-01-15 DIAGNOSIS — K76.0 FATTY LIVER: ICD-10-CM

## 2024-01-15 DIAGNOSIS — Z00.00 PREVENTATIVE HEALTH CARE: Primary | ICD-10-CM

## 2024-01-15 DIAGNOSIS — Z15.09 BRCA GENE MUTATION POSITIVE IN FEMALE: ICD-10-CM

## 2024-01-15 LAB
ALBUMIN SERPL BCG-MCNC: 4.3 G/DL (ref 3.5–5.2)
ALP SERPL-CCNC: 97 U/L (ref 40–150)
ALT SERPL W P-5'-P-CCNC: 28 U/L (ref 0–50)
ANION GAP SERPL CALCULATED.3IONS-SCNC: 7 MMOL/L (ref 7–15)
AST SERPL W P-5'-P-CCNC: 23 U/L (ref 0–45)
BILIRUB SERPL-MCNC: 0.3 MG/DL
BUN SERPL-MCNC: 16.1 MG/DL (ref 6–20)
CALCIUM SERPL-MCNC: 10.1 MG/DL (ref 8.6–10)
CHLORIDE SERPL-SCNC: 99 MMOL/L (ref 98–107)
CHOLEST SERPL-MCNC: 231 MG/DL
CREAT SERPL-MCNC: 1.01 MG/DL (ref 0.51–0.95)
CREAT UR-MCNC: 122 MG/DL
DEPRECATED HCO3 PLAS-SCNC: 32 MMOL/L (ref 22–29)
EGFRCR SERPLBLD CKD-EPI 2021: 64 ML/MIN/1.73M2
ERYTHROCYTE [DISTWIDTH] IN BLOOD BY AUTOMATED COUNT: 13.1 % (ref 10–15)
FASTING STATUS PATIENT QL REPORTED: YES
GLUCOSE SERPL-MCNC: 137 MG/DL (ref 70–99)
HBA1C MFR BLD: 6.3 %
HCT VFR BLD AUTO: 40.3 % (ref 35–47)
HDLC SERPL-MCNC: 58 MG/DL
HGB BLD-MCNC: 12.9 G/DL (ref 11.7–15.7)
LDLC SERPL CALC-MCNC: 146 MG/DL
MCH RBC QN AUTO: 28.6 PG (ref 26.5–33)
MCHC RBC AUTO-ENTMCNC: 32 G/DL (ref 31.5–36.5)
MCV RBC AUTO: 89 FL (ref 78–100)
MICROALBUMIN UR-MCNC: <12 MG/L
MICROALBUMIN/CREAT UR: NORMAL MG/G{CREAT}
NONHDLC SERPL-MCNC: 173 MG/DL
PLATELET # BLD AUTO: 366 10E3/UL (ref 150–450)
POTASSIUM SERPL-SCNC: 4.3 MMOL/L (ref 3.4–5.3)
PROT SERPL-MCNC: 7.7 G/DL (ref 6.4–8.3)
RBC # BLD AUTO: 4.51 10E6/UL (ref 3.8–5.2)
SODIUM SERPL-SCNC: 138 MMOL/L (ref 135–145)
TRIGL SERPL-MCNC: 135 MG/DL
WBC # BLD AUTO: 14.4 10E3/UL (ref 4–11)

## 2024-01-15 PROCEDURE — 99214 OFFICE O/P EST MOD 30 MIN: CPT | Performed by: INTERNAL MEDICINE

## 2024-01-15 PROCEDURE — 85048 AUTOMATED LEUKOCYTE COUNT: CPT

## 2024-01-15 PROCEDURE — 36415 COLL VENOUS BLD VENIPUNCTURE: CPT

## 2024-01-15 PROCEDURE — 82465 ASSAY BLD/SERUM CHOLESTEROL: CPT

## 2024-01-15 PROCEDURE — 80053 COMPREHEN METABOLIC PANEL: CPT

## 2024-01-15 PROCEDURE — 99396 PREV VISIT EST AGE 40-64: CPT | Performed by: INTERNAL MEDICINE

## 2024-01-15 PROCEDURE — 82043 UR ALBUMIN QUANTITATIVE: CPT

## 2024-01-15 PROCEDURE — 82306 VITAMIN D 25 HYDROXY: CPT

## 2024-01-15 PROCEDURE — 83036 HEMOGLOBIN GLYCOSYLATED A1C: CPT

## 2024-01-15 NOTE — LETTER
1/15/2024       RE: Sola Silverman  [address] [place] [address]     Dear Colleague,    Thank you for referring your patient, Sola Silverman, to the [facility] [place] at [facility]. Please see a copy of my visit note below.     SUBJECTIVE:   CC: Sola Silverman is an 58 year old woman who presents for preventive health visit.       Patient has been advised of split billing requirements and indicates understanding: Yes  Healthy Habits:  Do you get at least three servings of calcium containing foods daily (dairy, green leafy vegetables, etc.)? yes  Amount of exercise or daily activities, outside of work: not regular  Problems taking medications regularly No  Medication side effects: No  Have you had an eye exam in the past two years? yes  Do you see a dentist twice per year? yes  Do you have sleep apnea, excessive snoring or daytime drowsiness?no      -------------------------------------    Today's PHQ-2 Score:       1/15/2024    11:05 AM 3/27/2023    10:30 AM   PHQ-2 ( 1999 [employer])   Q1: Little interest or pleasure in doing things 0 0   Q2: Feeling down, depressed or hopeless 1 0   PHQ-2 Score 1 0       Abuse: Current or Past(Physical, Sexual or Emotional)- No  Do you feel safe in your environment? Yes        Social History     Tobacco Use    Smoking status: Never    Smokeless tobacco: Never   Substance Use Topics    Alcohol use: Yes     Comment: rare     If you drink alcohol do you typically have >3 drinks per day or >7 drinks per week? No                     Reviewed orders with patient.  Reviewed health maintenance and updated orders accordingly - Yes  Labs reviewed in [facility]    FHS-7:       11/22/2022     1:54 PM   Breast CA Risk Assessment (FHS-7)   Did any of your first-degree relatives have breast or ovarian cancer? Yes   Did any woman in your family have breast and ovarian cancer? Yes   Did any woman in your family have  breast cancer before age 50 y? No   Do you have 2 or more relatives with breast and/or ovarian cancer? No   Do you have 2 or more relatives with breast and/or bowel cancer? No     click delete button to remove this line now  Mammogram Screening - Alternate mammogram schedule due to breast cancer history  Pertinent mammograms are reviewed under the imaging tab.    Pertinent mammograms are reviewed under the imaging tab.  History of abnormal Pap smear: Status post benign hysterectomy. Health Maintenance and Surgical History updated.      Latest Ref Rng & Units 7/24/2020    11:00 AM 7/24/2020    10:56 AM 6/15/2015    11:31 AM   PAP / HPV   PAP (Historical)   NIL     HPV 16 DNA NEG^Negative Negative   Negative    HPV 18 DNA NEG^Negative Negative   Negative    Other HR HPV NEG^Negative Negative   Negative      Reviewed and updated as needed this visit by clinical staff    Allergies  Meds              Reviewed and updated as needed this visit by Provider                 Past Medical History:   Diagnosis Date    Allergy     Ankle joint pain     BRCA2 positive     Depression     Diabetes (H)     Hyperlipidemia     Hypertension     Lumbago     Morbid obesity (H)       Past Surgical History:   Procedure Laterality Date    ABDOMEN SURGERY      COLONOSCOPY N/A 4/23/2021    Procedure: COLONOSCOPY, WITH POLYPECTOMY AND BIOPSY;  Surgeon: Aquiles Fonseca MD;  Location: [facility] OR    EXCISE MASS LOWER EXTREMITY  4/11/2011    Procedure:EXCISE MASS LOWER EXTREMITY; Wound Mass; Surgeon:DREW LAURA; Location:[facility]    FOOT SURGERY  2000    left    KNEE SURGERY  1986    left    SALPINGO-OOPHORECTOMY BILATERAL         ROS:  CONSTITUTIONAL: NEGATIVE for fever, chills, change in weight  INTEGUMENTARY/SKIN: NEGATIVE for worrisome rashes, moles or lesions  EYES: NEGATIVE for vision changes or irritation  ENT: NEGATIVE for ear, mouth and throat problems  RESP: NEGATIVE for significant cough or SOB  BREAST: NEGATIVE for masses,  "tenderness or discharge  CV: NEGATIVE for chest pain, palpitations or peripheral edema  GI: NEGATIVE for nausea, abdominal pain, heartburn, or change in bowel habits  : NEGATIVE for unusual urinary or vaginal symptoms. No vaginal bleeding.  MUSCULOSKELETAL: NEGATIVE for significant arthralgias or myalgia  NEURO: NEGATIVE for weakness, dizziness or paresthesias  PSYCHIATRIC: NEGATIVE for changes in mood or affect     OBJECTIVE:   /84   Pulse 73   Ht 1.715 m (5' 7.52\")   Wt 107.6 kg (237 lb 3.2 oz)   BMI 36.58 kg/m    EXAM:  GENERAL: healthy, alert and no distress  EYES: Eyes grossly normal to inspection, PERRL and conjunctivae and sclerae normal  RESP: lungs clear to auscultation - no rales, rhonchi or wheezes  CV: regular rate and rhythm, normal S1 S2, no S3 or S4, no murmur, click or rub, no peripheral edema and peripheral pulses strong  ABDOMEN: soft, nontender and bowel sounds normal  MS: no gross musculoskeletal defects noted, no edema  SKIN: no suspicious lesions or rashes  NEURO: Normal strength and tone, mentation intact and speech normal    Diagnostic Test Results:  Labs reviewed in Epic    ASSESSMENT/PLAN:       ICD-10-CM    1. Preventative health care  Z00.00       2. Hyperlipidemia with target LDL less than 130  E78.5       3. Essential hypertension, benign  I10       4. Type 2 diabetes mellitus without complication, without long-term current use of insulin (H)  E11.9 Lipid Profile     Hemoglobin A1c     Comprehensive metabolic panel     Albumin Random Urine Quantitative with Creat Ratio     25- OH-Vitamin D      5. Fatty liver  K76.0 CBC with platelets      6. BRCA gene mutation positive in female  Z15.01 MR Breast Bilateral w/o & w Contrast    Z15.02     Z15.09           Patient has been advised of split billing requirements and indicates understanding: Yes  COUNSELING:   Reviewed preventive health counseling, as reflected in patient instructions       Regular exercise       Healthy " "diet/nutrition    Estimated body mass index is 36.58 kg/m  as calculated from the following:    Height as of this encounter: 1.715 m (5' 7.52\").    Weight as of this encounter: 107.6 kg (237 lb 3.2 oz).        She reports that she has never smoked. She has never used smokeless tobacco.      Counseling Resources:  ATP IV Guidelines  Pooled Cohorts Equation Calculator  Breast Cancer Risk Calculator  BRCA-Related Cancer Risk Assessment: FHS-7 Tool  FRAX Risk Assessment  ICSI Preventive Guidelines  Dietary Guidelines for Americans, 2010  USDA's MyPlate  ASA Prophylaxis  Lung CA Screening    Alda Santamaria MD  [facility] [place]      "

## 2024-01-15 NOTE — PATIENT INSTRUCTIONS
Thank you for trusting us with your care!     If you need to contact us for questions about:  Symptoms, Scheduling & Medical Questions; Non-urgent (2-3 day response) Anna message, Urgent (needing response today) [phone] (if after 3:30pm next day response)   Prescriptions: Please call your Pharmacy   Billing: [facility] [phone] or [facility]:[phone]

## 2024-01-15 NOTE — PROGRESS NOTES
SUBJECTIVE:   CC: Sola Silverman is an 58 year old woman who presents for preventive health visit.       Patient has been advised of split billing requirements and indicates understanding: Yes  Healthy Habits:    Do you get at least three servings of calcium containing foods daily (dairy, green leafy vegetables, etc.)? yes    Amount of exercise or daily activities, outside of work: not regular    Problems taking medications regularly No    Medication side effects: No    Have you had an eye exam in the past two years? yes    Do you see a dentist twice per year? yes    Do you have sleep apnea, excessive snoring or daytime drowsiness?no      -------------------------------------    Today's PHQ-2 Score:       1/15/2024    11:05 AM 3/27/2023    10:30 AM   PHQ-2 ( 1999 Pfizer)   Q1: Little interest or pleasure in doing things 0 0   Q2: Feeling down, depressed or hopeless 1 0   PHQ-2 Score 1 0       Abuse: Current or Past(Physical, Sexual or Emotional)- No  Do you feel safe in your environment? Yes        Social History     Tobacco Use     Smoking status: Never     Smokeless tobacco: Never   Substance Use Topics     Alcohol use: Yes     Comment: rare     If you drink alcohol do you typically have >3 drinks per day or >7 drinks per week? No                     Reviewed orders with patient.  Reviewed health maintenance and updated orders accordingly - Yes  Labs reviewed in [facility]:       11/22/2022     1:54 PM   Breast CA Risk Assessment (FHS-7)   Did any of your first-degree relatives have breast or ovarian cancer? Yes   Did any woman in your family have breast and ovarian cancer? Yes   Did any woman in your family have breast cancer before age 50 y? No   Do you have 2 or more relatives with breast and/or ovarian cancer? No   Do you have 2 or more relatives with breast and/or bowel cancer? No     click delete button to remove this line now  Mammogram Screening - Alternate mammogram schedule due to breast cancer  history  Pertinent mammograms are reviewed under the imaging tab.    Pertinent mammograms are reviewed under the imaging tab.  History of abnormal Pap smear: Status post benign hysterectomy. Health Maintenance and Surgical History updated.      Latest Ref Rng & Units 7/24/2020    11:00 AM 7/24/2020    10:56 AM 6/15/2015    11:31 AM   PAP / HPV   PAP (Historical)   NIL     HPV 16 DNA NEG^Negative Negative   Negative    HPV 18 DNA NEG^Negative Negative   Negative    Other HR HPV NEG^Negative Negative   Negative      Reviewed and updated as needed this visit by clinical staff    Allergies  Meds              Reviewed and updated as needed this visit by Provider                 Past Medical History:   Diagnosis Date     Allergy      Ankle joint pain      BRCA2 positive      Depression      Diabetes (H)      Hyperlipidemia      Hypertension      Lumbago      Morbid obesity (H)       Past Surgical History:   Procedure Laterality Date     ABDOMEN SURGERY       COLONOSCOPY N/A 4/23/2021    Procedure: COLONOSCOPY, WITH POLYPECTOMY AND BIOPSY;  Surgeon: Aquiles Fonseca MD;  Location: [facility] OR     EXCISE MASS LOWER EXTREMITY  4/11/2011    Procedure:EXCISE MASS LOWER EXTREMITY; Wound Mass; Surgeon:DREW LAURA; Location:[facility]     FOOT SURGERY  2000    left     KNEE SURGERY  1986    left     SALPINGO-OOPHORECTOMY BILATERAL         ROS:  CONSTITUTIONAL: NEGATIVE for fever, chills, change in weight  INTEGUMENTARY/SKIN: NEGATIVE for worrisome rashes, moles or lesions  EYES: NEGATIVE for vision changes or irritation  ENT: NEGATIVE for ear, mouth and throat problems  RESP: NEGATIVE for significant cough or SOB  BREAST: NEGATIVE for masses, tenderness or discharge  CV: NEGATIVE for chest pain, palpitations or peripheral edema  GI: NEGATIVE for nausea, abdominal pain, heartburn, or change in bowel habits  : NEGATIVE for unusual urinary or vaginal symptoms. No vaginal bleeding.  MUSCULOSKELETAL: NEGATIVE for  "significant arthralgias or myalgia  NEURO: NEGATIVE for weakness, dizziness or paresthesias  PSYCHIATRIC: NEGATIVE for changes in mood or affect     OBJECTIVE:   /84   Pulse 73   Ht 1.715 m (5' 7.52\")   Wt 107.6 kg (237 lb 3.2 oz)   BMI 36.58 kg/m    EXAM:  GENERAL: healthy, alert and no distress  EYES: Eyes grossly normal to inspection, PERRL and conjunctivae and sclerae normal  RESP: lungs clear to auscultation - no rales, rhonchi or wheezes  CV: regular rate and rhythm, normal S1 S2, no S3 or S4, no murmur, click or rub, no peripheral edema and peripheral pulses strong  ABDOMEN: soft, nontender and bowel sounds normal  MS: no gross musculoskeletal defects noted, no edema  SKIN: no suspicious lesions or rashes  NEURO: Normal strength and tone, mentation intact and speech normal    Diagnostic Test Results:  Labs reviewed in Epic    ASSESSMENT/PLAN:       ICD-10-CM    1. Preventative health care  Z00.00       2. Hyperlipidemia with target LDL less than 130  E78.5       3. Essential hypertension, benign  I10       4. Type 2 diabetes mellitus without complication, without long-term current use of insulin (H)  E11.9 Lipid Profile     Hemoglobin A1c     Comprehensive metabolic panel     Albumin Random Urine Quantitative with Creat Ratio     25- OH-Vitamin D      5. Fatty liver  K76.0 CBC with platelets      6. BRCA gene mutation positive in female  Z15.01 MR Breast Bilateral w/o & w Contrast    Z15.02     Z15.09           Patient has been advised of split billing requirements and indicates understanding: Yes  COUNSELING:   Reviewed preventive health counseling, as reflected in patient instructions       Regular exercise       Healthy diet/nutrition    Estimated body mass index is 36.58 kg/m  as calculated from the following:    Height as of this encounter: 1.715 m (5' 7.52\").    Weight as of this encounter: 107.6 kg (237 lb 3.2 oz).        She reports that she has never smoked. She has never used smokeless " tobacco.      Counseling Resources:  ATP IV Guidelines  Pooled Cohorts Equation Calculator  Breast Cancer Risk Calculator  BRCA-Related Cancer Risk Assessment: FHS-7 Tool  FRAX Risk Assessment  ICSI Preventive Guidelines  Dietary Guidelines for Americans, 2010  [employer]'s MyPlate  ASA Prophylaxis  Lung CA Screening    Alda Santamaria MD  [facility]

## 2024-01-17 LAB — VIT D+METAB SERPL-MCNC: 41 NG/ML (ref 20–50)

## 2024-01-31 DIAGNOSIS — L90.0 LICHEN SCLEROSUS: ICD-10-CM

## 2024-01-31 RX ORDER — CLOBETASOL PROPIONATE 0.5 MG/G
OINTMENT TOPICAL
Qty: 60 G | Refills: 0 | Status: SHIPPED | OUTPATIENT
Start: 2024-02-01

## 2024-01-31 NOTE — TELEPHONE ENCOUNTER
Refill for clobetasol requested. Pt last seen by Dr. Callahan 1/2/23, unable to fill per RN protocol. Routed to Dr. Callahan for approval.

## 2024-02-18 ENCOUNTER — ANCILLARY PROCEDURE (OUTPATIENT)
Dept: MRI IMAGING | Facility: CLINIC | Age: 59
End: 2024-02-18
Attending: INTERNAL MEDICINE
Payer: COMMERCIAL

## 2024-02-18 DIAGNOSIS — Z15.02 BRCA GENE MUTATION POSITIVE IN FEMALE: ICD-10-CM

## 2024-02-18 DIAGNOSIS — Z15.09 BRCA GENE MUTATION POSITIVE IN FEMALE: ICD-10-CM

## 2024-02-18 DIAGNOSIS — Z15.01 BRCA GENE MUTATION POSITIVE IN FEMALE: ICD-10-CM

## 2024-02-18 PROCEDURE — A9585 GADOBUTROL INJECTION: HCPCS | Performed by: RADIOLOGY

## 2024-02-18 PROCEDURE — 77049 MRI BREAST C-+ W/CAD BI: CPT | Performed by: RADIOLOGY

## 2024-02-18 RX ORDER — GADOBUTROL 604.72 MG/ML
10 INJECTION INTRAVENOUS ONCE
Status: COMPLETED | OUTPATIENT
Start: 2024-02-18 | End: 2024-02-18

## 2024-02-18 RX ADMIN — GADOBUTROL 10 ML: 604.72 INJECTION INTRAVENOUS at 09:01

## 2024-04-06 DIAGNOSIS — I10 ESSENTIAL HYPERTENSION: Primary | ICD-10-CM

## 2024-04-12 DIAGNOSIS — E78.01 HYPERLIPIDEMIA TYPE II: ICD-10-CM

## 2024-04-12 DIAGNOSIS — N95.2 ATROPHIC VAGINITIS: ICD-10-CM

## 2024-04-12 DIAGNOSIS — E11.9 TYPE 2 DIABETES MELLITUS WITHOUT COMPLICATION, WITHOUT LONG-TERM CURRENT USE OF INSULIN (H): ICD-10-CM

## 2024-04-12 NOTE — TELEPHONE ENCOUNTER
[facility] Call Center    Phone Message    May a detailed message be left on voicemail: yes     Reason for Call: Medication Refill Request    Has the patient contacted the pharmacy for the refill? Yes   Name of medication being requested: estradiol (YUVAFEM) 10 MCG ,metFORMIN (GLUCOPHAGE XR) 500 MG , simvastatin (ZOCOR) 20 MG ,losartan (COZAAR) 100 MG  and hydrochlorothiazide (HYDRODIURIL) 12.5 MG      Provider who prescribed the medication: Hina  Pharmacy: [id number] - [place], [place] - [address] AT [address] OF [address]      Date medication is needed: ASAP     Action Taken: Message routed to:  Other: [facility]    Travel Screening: Not Applicable

## 2024-04-15 RX ORDER — SIMVASTATIN 20 MG
20 TABLET ORAL DAILY
Qty: 90 TABLET | Refills: 3 | Status: CANCELLED | OUTPATIENT
Start: 2024-04-15

## 2024-04-15 RX ORDER — HYDROCHLOROTHIAZIDE 12.5 MG/1
12.5 TABLET ORAL DAILY
Qty: 90 TABLET | Refills: 3 | Status: SHIPPED | OUTPATIENT
Start: 2024-04-15

## 2024-04-15 RX ORDER — ESTRADIOL 10 UG/1
INSERT VAGINAL
Qty: 24 TABLET | Refills: 3 | Status: SHIPPED | OUTPATIENT
Start: 2024-04-15

## 2024-04-15 RX ORDER — LOSARTAN POTASSIUM 100 MG/1
100 TABLET ORAL DAILY
Qty: 90 TABLET | Refills: 3 | Status: SHIPPED | OUTPATIENT
Start: 2024-04-15

## 2024-04-15 RX ORDER — METFORMIN HCL 500 MG
1000 TABLET, EXTENDED RELEASE 24 HR ORAL 2 TIMES DAILY WITH MEALS
Qty: 180 TABLET | Refills: 3 | Status: SHIPPED | OUTPATIENT
Start: 2024-04-15

## 2024-04-16 RX ORDER — SIMVASTATIN 40 MG
40 TABLET ORAL DAILY
Qty: 90 TABLET | Refills: 0 | Status: SHIPPED | OUTPATIENT
Start: 2024-04-16 | End: 2024-07-22

## 2024-04-16 NOTE — TELEPHONE ENCOUNTER
Covering for PCP/ordering provider (out of clinic today):  dose of simvastatin increased to 40mg.  Recommend rechecking lab in 2-3 months to see how this is going.  Lab ordered.    Thanks,  Mickey Calderon MD

## 2024-07-19 DIAGNOSIS — E78.01 HYPERLIPIDEMIA TYPE II: ICD-10-CM

## 2024-07-22 RX ORDER — SIMVASTATIN 40 MG
40 TABLET ORAL DAILY
Qty: 90 TABLET | Refills: 1 | Status: SHIPPED | OUTPATIENT
Start: 2024-07-22

## 2024-09-01 ENCOUNTER — HEALTH MAINTENANCE LETTER (OUTPATIENT)
Age: 59
End: 2024-09-01

## 2025-01-28 DIAGNOSIS — E78.01 HYPERLIPIDEMIA TYPE II: ICD-10-CM

## 2025-02-02 ENCOUNTER — HEALTH MAINTENANCE LETTER (OUTPATIENT)
Age: 60
End: 2025-02-02

## 2025-02-03 RX ORDER — SIMVASTATIN 40 MG
40 TABLET ORAL DAILY
Qty: 90 TABLET | Refills: 0 | Status: SHIPPED | OUTPATIENT
Start: 2025-02-03

## 2025-02-03 NOTE — TELEPHONE ENCOUNTER
Last Written Prescription:    Antihyperlipidemic agents [id number]/28/2025 08:10 AM   Protocol Details LDL on file in the past 12 months    Recent (12 mo) or future (90 days) visit within the authorizing provider's specialty        ----------------------  Last Visit Date: 1/15/2024  [facility]      Future Visit Date: 0  ----------------------      Refill decision: Medication refilled per  Medication Refill in Ambulatory Care  policy.     [x]    Supervision: no future appointment scheduled. Scheduling has been notified to contact the pt for appointment.    -A one-time only 90-day heri refill given, FYI to care team.  -Overdue labs/test:  LDL      Request from pharmacy:  Requested Prescriptions   Pending Prescriptions Disp Refills    simvastatin (ZOCOR) 40 MG tablet [Pharmacy Med Name: SIMVASTATIN 40MG TABLETS] 90 tablet 1     Sig: TAKE 1 TABLET(40 MG) BY MOUTH DAILY       Antihyperlipidemic agents Failed - 2/3/2025 11:31 AM        Failed - LDL on file in the past 12 months        Failed - Recent (12 mo) or future (90 days) visit within the authorizing provider's specialty     The patient must have completed an in-person or virtual visit within the past 12 months or has a future visit scheduled within the next 90 days with the authorizing provider s specialty.  Urgent care and e-visits do not qualify as an office visit for this protocol.          Passed - Medication is active on med list        Passed - Patient is age 18 years or older        Passed - No active pregnancy on record        Passed - No positive pregnancy test in past 12 mos           LDL Cholesterol Calculated   Date Value Ref Range Status   01/15/2024 146 (H) <=100 mg/dL Final   08/28/2020 59 <100 mg/dL Final     Comment:     Desirable:       <100 mg/dl

## 2025-03-02 ENCOUNTER — HEALTH MAINTENANCE LETTER (OUTPATIENT)
Age: 60
End: 2025-03-02

## 2025-05-05 ENCOUNTER — OFFICE VISIT (OUTPATIENT)
Dept: INTERNAL MEDICINE | Facility: CLINIC | Age: 60
End: 2025-05-05
Payer: COMMERCIAL

## 2025-05-05 ENCOUNTER — LAB (OUTPATIENT)
Dept: LAB | Facility: CLINIC | Age: 60
End: 2025-05-05
Payer: COMMERCIAL

## 2025-05-05 VITALS
HEART RATE: 83 BPM | OXYGEN SATURATION: 98 % | DIASTOLIC BLOOD PRESSURE: 87 MMHG | BODY MASS INDEX: 34.33 KG/M2 | HEIGHT: 68 IN | RESPIRATION RATE: 14 BRPM | WEIGHT: 226.5 LBS | TEMPERATURE: 97.9 F | SYSTOLIC BLOOD PRESSURE: 126 MMHG

## 2025-05-05 DIAGNOSIS — I10 ESSENTIAL HYPERTENSION: ICD-10-CM

## 2025-05-05 DIAGNOSIS — E78.01 HYPERLIPIDEMIA TYPE II: ICD-10-CM

## 2025-05-05 DIAGNOSIS — Z00.00 PREVENTATIVE HEALTH CARE: Primary | ICD-10-CM

## 2025-05-05 DIAGNOSIS — E11.9 TYPE 2 DIABETES MELLITUS WITHOUT COMPLICATION, WITHOUT LONG-TERM CURRENT USE OF INSULIN (H): ICD-10-CM

## 2025-05-05 DIAGNOSIS — Z00.00 PREVENTATIVE HEALTH CARE: ICD-10-CM

## 2025-05-05 LAB
ALBUMIN SERPL BCG-MCNC: 4.4 G/DL (ref 3.5–5.2)
ALP SERPL-CCNC: 73 U/L (ref 40–150)
ALT SERPL W P-5'-P-CCNC: 17 U/L (ref 0–50)
ANION GAP SERPL CALCULATED.3IONS-SCNC: 11 MMOL/L (ref 7–15)
AST SERPL W P-5'-P-CCNC: 22 U/L (ref 0–45)
BILIRUB SERPL-MCNC: 0.3 MG/DL
BUN SERPL-MCNC: 21.7 MG/DL (ref 8–23)
CALCIUM SERPL-MCNC: 9.8 MG/DL (ref 8.8–10.4)
CHLORIDE SERPL-SCNC: 98 MMOL/L (ref 98–107)
CHOLEST SERPL-MCNC: 250 MG/DL
CREAT SERPL-MCNC: 1.1 MG/DL (ref 0.51–0.95)
CREAT UR-MCNC: 155 MG/DL
EGFRCR SERPLBLD CKD-EPI 2021: 58 ML/MIN/1.73M2
ERYTHROCYTE [DISTWIDTH] IN BLOOD BY AUTOMATED COUNT: 13.2 % (ref 10–15)
EST. AVERAGE GLUCOSE BLD GHB EST-MCNC: 140 MG/DL
FASTING STATUS PATIENT QL REPORTED: YES
FASTING STATUS PATIENT QL REPORTED: YES
GLUCOSE SERPL-MCNC: 135 MG/DL (ref 70–99)
HBA1C MFR BLD: 6.5 %
HCO3 SERPL-SCNC: 29 MMOL/L (ref 22–29)
HCT VFR BLD AUTO: 39.1 % (ref 35–47)
HDLC SERPL-MCNC: 63 MG/DL
HGB BLD-MCNC: 12.8 G/DL (ref 11.7–15.7)
LDLC SERPL CALC-MCNC: 168 MG/DL
MCH RBC QN AUTO: 28.8 PG (ref 26.5–33)
MCHC RBC AUTO-ENTMCNC: 32.7 G/DL (ref 31.5–36.5)
MCV RBC AUTO: 88 FL (ref 78–100)
MICROALBUMIN UR-MCNC: 12.1 MG/L
MICROALBUMIN/CREAT UR: 7.81 MG/G CR (ref 0–25)
NONHDLC SERPL-MCNC: 187 MG/DL
PLATELET # BLD AUTO: 314 10E3/UL (ref 150–450)
POTASSIUM SERPL-SCNC: 4.3 MMOL/L (ref 3.4–5.3)
PROT SERPL-MCNC: 7.5 G/DL (ref 6.4–8.3)
RBC # BLD AUTO: 4.44 10E6/UL (ref 3.8–5.2)
SODIUM SERPL-SCNC: 138 MMOL/L (ref 135–145)
TRIGL SERPL-MCNC: 97 MG/DL
TSH SERPL DL<=0.005 MIU/L-ACNC: 3.36 UIU/ML (ref 0.3–4.2)
WBC # BLD AUTO: 10.4 10E3/UL (ref 4–11)

## 2025-05-05 PROCEDURE — 36415 COLL VENOUS BLD VENIPUNCTURE: CPT | Performed by: PATHOLOGY

## 2025-05-05 PROCEDURE — 99396 PREV VISIT EST AGE 40-64: CPT | Performed by: INTERNAL MEDICINE

## 2025-05-05 PROCEDURE — 80061 LIPID PANEL: CPT | Performed by: PATHOLOGY

## 2025-05-05 PROCEDURE — 85027 COMPLETE CBC AUTOMATED: CPT | Performed by: PATHOLOGY

## 2025-05-05 PROCEDURE — 3079F DIAST BP 80-89 MM HG: CPT | Performed by: INTERNAL MEDICINE

## 2025-05-05 PROCEDURE — 80053 COMPREHEN METABOLIC PANEL: CPT | Performed by: PATHOLOGY

## 2025-05-05 PROCEDURE — 87624 HPV HI-RISK TYP POOLED RSLT: CPT | Performed by: INTERNAL MEDICINE

## 2025-05-05 PROCEDURE — 82570 ASSAY OF URINE CREATININE: CPT | Performed by: INTERNAL MEDICINE

## 2025-05-05 PROCEDURE — G0145 SCR C/V CYTO,THINLAYER,RESCR: HCPCS | Performed by: INTERNAL MEDICINE

## 2025-05-05 PROCEDURE — 3074F SYST BP LT 130 MM HG: CPT | Performed by: INTERNAL MEDICINE

## 2025-05-05 PROCEDURE — 84443 ASSAY THYROID STIM HORMONE: CPT | Performed by: PATHOLOGY

## 2025-05-05 PROCEDURE — 83036 HEMOGLOBIN GLYCOSYLATED A1C: CPT | Performed by: INTERNAL MEDICINE

## 2025-05-05 PROCEDURE — 99000 SPECIMEN HANDLING OFFICE-LAB: CPT | Performed by: PATHOLOGY

## 2025-05-05 RX ORDER — SIMVASTATIN 40 MG
40 TABLET ORAL DAILY
Qty: 90 TABLET | Refills: 3 | Status: SHIPPED | OUTPATIENT
Start: 2025-05-05

## 2025-05-05 RX ORDER — HYDROCHLOROTHIAZIDE 12.5 MG/1
12.5 TABLET ORAL DAILY
Qty: 90 TABLET | Refills: 3 | Status: SHIPPED | OUTPATIENT
Start: 2025-05-05

## 2025-05-05 RX ORDER — LOSARTAN POTASSIUM 100 MG/1
100 TABLET ORAL DAILY
Qty: 90 TABLET | Refills: 3 | Status: SHIPPED | OUTPATIENT
Start: 2025-05-05

## 2025-05-05 RX ORDER — METFORMIN HYDROCHLORIDE 500 MG/1
1000 TABLET, EXTENDED RELEASE ORAL 2 TIMES DAILY WITH MEALS
Qty: 360 TABLET | Refills: 3 | Status: SHIPPED | OUTPATIENT
Start: 2025-05-05

## 2025-05-05 NOTE — PROGRESS NOTES
"  Assessment & Plan     Preventative health care  Discussed preventive healthcare needs with patient.  Pap smear was collected today.  Patient was also advised on mammogram and MRI for high risk cancer screening.  She is up-to-date on colon cancer screening.  - MA Screening Bilateral w/ Bryant; Future  - HPV and Gynecologic Cytology Panel - Recommended Age 30 - 65 Years  - Hemoglobin A1c; Future    Type 2 diabetes mellitus without complication, without long-term current use of insulin (H)  Reviewed management of type 2 diabetes with patient.  Will check hemoglobin A1c.  Medications were refilled today.  Recommend eye exam.  Will check lipid panel today.  - Adult Eye  Referral; Future  - Lipid panel reflex to direct LDL Non-fasting; Future  - Albumin Random Urine Quantitative with Creat Ratio; Future  - metFORMIN (GLUCOPHAGE XR) 500 MG 24 hr tablet; Take 2 tablets (1,000 mg) by mouth 2 times daily (with meals).    Hyperlipidemia type II  Patient was encouraged to restart simvastatin given diagnosis of diabetes.  - simvastatin (ZOCOR) 40 MG tablet; Take 1 tablet (40 mg) by mouth daily.  - losartan (COZAAR) 100 MG tablet; Take 1 tablet (100 mg) by mouth daily.    Essential hypertension  Blood pressure is within acceptable range.  Recommend to continue with current medical therapy without changes.  - hydroCHLOROthiazide 12.5 MG tablet; Take 1 tablet (12.5 mg) by mouth daily.          BMI  Estimated body mass index is 34.93 kg/m  as calculated from the following:    Height as of this encounter: 1.715 m (5' 7.52\").    Weight as of this encounter: 102.7 kg (226 lb 8 oz).       Heather Selby is a 59 year old, presenting for the following health issues:  Follow Up (Pt would like to discuss medications)      5/5/2025     7:18 AM   Additional Questions   Roomed by Sophie VARGAS   Accompanied by N/A     History of Present Illness       Reason for visit:  To discuss medications       Patient is here for routine " "preventive visit.  She reports that overall she has been feeling well.  She denies any acute concerns.  She has been working on weight loss.      Objective    /87 (BP Location: Right arm, Patient Position: Sitting, Cuff Size: Adult Regular)   Pulse 83   Temp 97.9  F (36.6  C)   Resp 14   Ht 1.715 m (5' 7.52\")   Wt 102.7 kg (226 lb 8 oz)   SpO2 98%   BMI 34.93 kg/m    Body mass index is 34.93 kg/m .  Physical Exam   GENERAL: alert and no distress  EYES: Eyes grossly normal to inspection, PERRL and conjunctivae and sclerae normal  NECK: no adenopathy, no asymmetry, masses, or scars  RESP: lungs clear to auscultation - no rales, rhonchi or wheezes  BREAST: normal without masses, tenderness or nipple discharge and no palpable axillary masses or adenopathy  CV: regular rate and rhythm, normal S1 S2, no S3 or S4, no murmur, click or rub, no peripheral edema  ABDOMEN: soft, nontender and bowel sounds normal   (female): normal female external genitalia, normal urethral meatus , normal vaginal mucosa, and normal cervix  MS: no gross musculoskeletal defects noted, no edema  SKIN: no suspicious lesions or rashes  NEURO: Normal strength and tone, mentation intact and speech normal            Signed Electronically by: Alda Santamaria MD    "

## 2025-05-06 ENCOUNTER — PATIENT OUTREACH (OUTPATIENT)
Dept: CARE COORDINATION | Facility: CLINIC | Age: 60
End: 2025-05-06
Payer: COMMERCIAL

## 2025-05-06 LAB
HPV HR 12 DNA CVX QL NAA+PROBE: NEGATIVE
HPV16 DNA CVX QL NAA+PROBE: NEGATIVE
HPV18 DNA CVX QL NAA+PROBE: NEGATIVE
HUMAN PAPILLOMA VIRUS FINAL DIAGNOSIS: NORMAL

## 2025-05-07 LAB
BKR AP ASSOCIATED HPV REPORT: NORMAL
BKR LAB AP GYN ADEQUACY: NORMAL
BKR LAB AP GYN INTERPRETATION: NORMAL
BKR LAB AP PREVIOUS ABNORMAL: NORMAL
PATH REPORT.COMMENTS IMP SPEC: NORMAL
PATH REPORT.COMMENTS IMP SPEC: NORMAL
PATH REPORT.RELEVANT HX SPEC: NORMAL

## 2025-05-08 ENCOUNTER — PATIENT OUTREACH (OUTPATIENT)
Dept: CARE COORDINATION | Facility: CLINIC | Age: 60
End: 2025-05-08
Payer: COMMERCIAL

## 2025-05-08 ENCOUNTER — RESULTS FOLLOW-UP (OUTPATIENT)
Dept: INTERNAL MEDICINE | Facility: CLINIC | Age: 60
End: 2025-05-08

## 2025-05-15 ENCOUNTER — ANCILLARY PROCEDURE (OUTPATIENT)
Dept: MAMMOGRAPHY | Facility: CLINIC | Age: 60
End: 2025-05-15
Attending: INTERNAL MEDICINE
Payer: COMMERCIAL

## 2025-05-15 DIAGNOSIS — Z00.00 PREVENTATIVE HEALTH CARE: ICD-10-CM

## 2025-05-15 PROCEDURE — 77067 SCR MAMMO BI INCL CAD: CPT | Performed by: STUDENT IN AN ORGANIZED HEALTH CARE EDUCATION/TRAINING PROGRAM

## 2025-05-15 PROCEDURE — 77063 BREAST TOMOSYNTHESIS BI: CPT | Performed by: STUDENT IN AN ORGANIZED HEALTH CARE EDUCATION/TRAINING PROGRAM

## (undated) DEVICE — FCP BIOPSY RADIAL JAW 4 JUMBO 3.2MM CHANNEL M00513370

## (undated) DEVICE — SUCTION MANIFOLD NEPTUNE 2 SYS 1 PORT 702-025-000

## (undated) DEVICE — ENDO TRAP POLYP E-TRAP 00711099

## (undated) DEVICE — KIT ENDO FIRST STEP DISINFECTANT 200ML W/POUCH EP-4

## (undated) DEVICE — SPECIMEN CONTAINER 3OZ W/FORMALIN 59901

## (undated) DEVICE — KIT ENDO TURNOVER/PROCEDURE CARRY-ON 101822

## (undated) DEVICE — GOWN IMPERVIOUS 2XL BLUE

## (undated) DEVICE — ENDO SNARE POLYPECTOMY OVAL 10MM LOOP SD-240U-10

## (undated) DEVICE — TUBING SUCTION 12"X1/4" N612

## (undated) DEVICE — SOL WATER IRRIG 500ML BOTTLE 2F7113

## (undated) RX ORDER — FENTANYL CITRATE 50 UG/ML
INJECTION, SOLUTION INTRAMUSCULAR; INTRAVENOUS
Status: DISPENSED
Start: 2021-04-23

## (undated) RX ORDER — TRIAMCINOLONE ACETONIDE 40 MG/ML
INJECTION, SUSPENSION INTRA-ARTICULAR; INTRAMUSCULAR
Status: DISPENSED
Start: 2023-03-23

## (undated) RX ORDER — LIDOCAINE HYDROCHLORIDE 10 MG/ML
INJECTION, SOLUTION EPIDURAL; INFILTRATION; INTRACAUDAL; PERINEURAL
Status: DISPENSED
Start: 2019-09-26

## (undated) RX ORDER — TRIAMCINOLONE ACETONIDE 40 MG/ML
INJECTION, SUSPENSION INTRA-ARTICULAR; INTRAMUSCULAR
Status: DISPENSED
Start: 2022-01-28

## (undated) RX ORDER — LIDOCAINE HYDROCHLORIDE 10 MG/ML
INJECTION, SOLUTION EPIDURAL; INFILTRATION; INTRACAUDAL; PERINEURAL
Status: DISPENSED
Start: 2022-05-16

## (undated) RX ORDER — TRIAMCINOLONE ACETONIDE 40 MG/ML
INJECTION, SUSPENSION INTRA-ARTICULAR; INTRAMUSCULAR
Status: DISPENSED
Start: 2022-05-16

## (undated) RX ORDER — TRIAMCINOLONE ACETONIDE 40 MG/ML
INJECTION, SUSPENSION INTRA-ARTICULAR; INTRAMUSCULAR
Status: DISPENSED
Start: 2019-09-26

## (undated) RX ORDER — LIDOCAINE HYDROCHLORIDE 10 MG/ML
INJECTION, SOLUTION EPIDURAL; INFILTRATION; INTRACAUDAL; PERINEURAL
Status: DISPENSED
Start: 2022-01-28

## (undated) RX ORDER — LIDOCAINE HYDROCHLORIDE 10 MG/ML
INJECTION, SOLUTION EPIDURAL; INFILTRATION; INTRACAUDAL; PERINEURAL
Status: DISPENSED
Start: 2023-03-23